# Patient Record
Sex: MALE | Race: WHITE | Employment: FULL TIME | URBAN - METROPOLITAN AREA
[De-identification: names, ages, dates, MRNs, and addresses within clinical notes are randomized per-mention and may not be internally consistent; named-entity substitution may affect disease eponyms.]

---

## 2017-01-08 DIAGNOSIS — F90.2 ATTENTION DEFICIT HYPERACTIVITY DISORDER (ADHD), COMBINED TYPE: Primary | ICD-10-CM

## 2017-01-09 RX ORDER — DEXTROAMPHETAMINE SACCHARATE, AMPHETAMINE ASPARTATE, DEXTROAMPHETAMINE SULFATE AND AMPHETAMINE SULFATE 5; 5; 5; 5 MG/1; MG/1; MG/1; MG/1
TABLET ORAL
Qty: 60 TABLET | Refills: 0 | Status: SHIPPED | OUTPATIENT
Start: 2017-01-09 | End: 2017-02-22

## 2017-01-09 RX ORDER — DEXTROAMPHETAMINE SACCHARATE, AMPHETAMINE ASPARTATE, DEXTROAMPHETAMINE SULFATE AND AMPHETAMINE SULFATE 5; 5; 5; 5 MG/1; MG/1; MG/1; MG/1
TABLET ORAL
Qty: 60 TABLET | Refills: 0 | Status: SHIPPED | OUTPATIENT
Start: 2017-01-09 | End: 2017-01-09

## 2017-02-22 DIAGNOSIS — F90.2 ATTENTION DEFICIT HYPERACTIVITY DISORDER (ADHD), COMBINED TYPE: ICD-10-CM

## 2017-02-23 RX ORDER — DEXTROAMPHETAMINE SACCHARATE, AMPHETAMINE ASPARTATE, DEXTROAMPHETAMINE SULFATE AND AMPHETAMINE SULFATE 5; 5; 5; 5 MG/1; MG/1; MG/1; MG/1
TABLET ORAL
Qty: 60 TABLET | Refills: 0 | Status: SHIPPED | OUTPATIENT
Start: 2017-02-23 | End: 2017-03-21

## 2017-03-21 DIAGNOSIS — F90.2 ATTENTION DEFICIT HYPERACTIVITY DISORDER (ADHD), COMBINED TYPE: ICD-10-CM

## 2017-03-22 RX ORDER — DEXTROAMPHETAMINE SACCHARATE, AMPHETAMINE ASPARTATE, DEXTROAMPHETAMINE SULFATE AND AMPHETAMINE SULFATE 5; 5; 5; 5 MG/1; MG/1; MG/1; MG/1
TABLET ORAL
Qty: 60 TABLET | Refills: 0 | Status: SHIPPED | OUTPATIENT
Start: 2017-03-22 | End: 2017-04-18

## 2017-04-18 DIAGNOSIS — F90.2 ATTENTION DEFICIT HYPERACTIVITY DISORDER (ADHD), COMBINED TYPE: ICD-10-CM

## 2017-04-19 RX ORDER — DEXTROAMPHETAMINE SACCHARATE, AMPHETAMINE ASPARTATE, DEXTROAMPHETAMINE SULFATE AND AMPHETAMINE SULFATE 5; 5; 5; 5 MG/1; MG/1; MG/1; MG/1
TABLET ORAL
Qty: 60 TABLET | Refills: 0 | Status: SHIPPED | OUTPATIENT
Start: 2017-04-19 | End: 2017-05-15

## 2017-05-15 DIAGNOSIS — F90.2 ATTENTION DEFICIT HYPERACTIVITY DISORDER (ADHD), COMBINED TYPE: ICD-10-CM

## 2017-05-15 RX ORDER — DEXTROAMPHETAMINE SACCHARATE, AMPHETAMINE ASPARTATE, DEXTROAMPHETAMINE SULFATE AND AMPHETAMINE SULFATE 5; 5; 5; 5 MG/1; MG/1; MG/1; MG/1
TABLET ORAL
Qty: 60 TABLET | Refills: 0 | Status: SHIPPED | OUTPATIENT
Start: 2017-05-15 | End: 2017-06-05

## 2017-06-05 DIAGNOSIS — F90.2 ATTENTION DEFICIT HYPERACTIVITY DISORDER (ADHD), COMBINED TYPE: ICD-10-CM

## 2017-06-06 RX ORDER — DEXTROAMPHETAMINE SACCHARATE, AMPHETAMINE ASPARTATE, DEXTROAMPHETAMINE SULFATE AND AMPHETAMINE SULFATE 5; 5; 5; 5 MG/1; MG/1; MG/1; MG/1
TABLET ORAL
Qty: 60 TABLET | Refills: 0 | Status: SHIPPED | OUTPATIENT
Start: 2017-06-06 | End: 2017-09-15 | Stop reason: DRUGHIGH

## 2017-06-16 DIAGNOSIS — F41.1 GENERALIZED ANXIETY DISORDER: Primary | ICD-10-CM

## 2017-06-16 LAB
ALBUMIN SERPL-MCNC: 3.8 G/DL (ref 3.4–5)
ALP SERPL-CCNC: 77 U/L (ref 65–260)
ALT SERPL W P-5'-P-CCNC: 42 U/L (ref 0–50)
ANION GAP SERPL CALCULATED.3IONS-SCNC: 5 MMOL/L (ref 3–14)
AST SERPL W P-5'-P-CCNC: 26 U/L (ref 0–35)
BASOPHILS # BLD AUTO: 0 10E9/L (ref 0–0.2)
BASOPHILS NFR BLD AUTO: 0.3 %
BILIRUB DIRECT SERPL-MCNC: <0.1 MG/DL (ref 0–0.2)
BILIRUB SERPL-MCNC: 0.3 MG/DL (ref 0.2–1.3)
BUN SERPL-MCNC: 13 MG/DL (ref 7–30)
CALCIUM SERPL-MCNC: 8.7 MG/DL (ref 8.5–10.1)
CHLORIDE SERPL-SCNC: 102 MMOL/L (ref 98–110)
CO2 SERPL-SCNC: 32 MMOL/L (ref 20–32)
CREAT SERPL-MCNC: 0.9 MG/DL (ref 0.5–1)
DIFFERENTIAL METHOD BLD: NORMAL
EOSINOPHIL # BLD AUTO: 0.5 10E9/L (ref 0–0.7)
EOSINOPHIL NFR BLD AUTO: 6.4 %
ERYTHROCYTE [DISTWIDTH] IN BLOOD BY AUTOMATED COUNT: 13.6 % (ref 10–15)
GFR SERPL CREATININE-BSD FRML MDRD: ABNORMAL ML/MIN/1.7M2
GLUCOSE SERPL-MCNC: 102 MG/DL (ref 70–99)
HCT VFR BLD AUTO: 45.9 % (ref 40–53)
HGB BLD-MCNC: 14.5 G/DL (ref 13.3–17.7)
LYMPHOCYTES # BLD AUTO: 1.9 10E9/L (ref 0.8–5.3)
LYMPHOCYTES NFR BLD AUTO: 26.6 %
MCH RBC QN AUTO: 26.8 PG (ref 26.5–33)
MCHC RBC AUTO-ENTMCNC: 31.6 G/DL (ref 31.5–36.5)
MCV RBC AUTO: 85 FL (ref 78–100)
MONOCYTES # BLD AUTO: 0.8 10E9/L (ref 0–1.3)
MONOCYTES NFR BLD AUTO: 11.3 %
NEUTROPHILS # BLD AUTO: 3.9 10E9/L (ref 1.6–8.3)
NEUTROPHILS NFR BLD AUTO: 55.4 %
PLATELET # BLD AUTO: 238 10E9/L (ref 150–450)
POTASSIUM SERPL-SCNC: 3.9 MMOL/L (ref 3.4–5.3)
PROT SERPL-MCNC: 7.1 G/DL (ref 6.8–8.8)
RBC # BLD AUTO: 5.42 10E12/L (ref 4.4–5.9)
SODIUM SERPL-SCNC: 139 MMOL/L (ref 133–144)
T4 FREE SERPL-MCNC: 0.85 NG/DL (ref 0.76–1.46)
TSH SERPL DL<=0.05 MIU/L-ACNC: 1.53 MU/L (ref 0.4–4)
WBC # BLD AUTO: 7 10E9/L (ref 4–11)

## 2017-06-16 PROCEDURE — 84439 ASSAY OF FREE THYROXINE: CPT | Performed by: PSYCHIATRY & NEUROLOGY

## 2017-06-16 PROCEDURE — 80050 GENERAL HEALTH PANEL: CPT | Performed by: PSYCHIATRY & NEUROLOGY

## 2017-06-16 PROCEDURE — 82248 BILIRUBIN DIRECT: CPT | Performed by: PSYCHIATRY & NEUROLOGY

## 2017-06-16 PROCEDURE — 80306 DRUG TEST PRSMV INSTRMNT: CPT | Performed by: PSYCHIATRY & NEUROLOGY

## 2017-06-16 PROCEDURE — 36415 COLL VENOUS BLD VENIPUNCTURE: CPT | Performed by: PSYCHIATRY & NEUROLOGY

## 2017-06-19 LAB
AMPHETAMINES UR QL: NOT DETECTED NG/ML
BARBITURATES UR QL SCN: NOT DETECTED NG/ML
BENZODIAZ UR QL SCN: NOT DETECTED NG/ML
BUPRENORPHINE UR QL: NOT DETECTED NG/ML
CANNABINOIDS UR QL: NOT DETECTED NG/ML
COCAINE UR QL SCN: NOT DETECTED NG/ML
D-METHAMPHET UR QL: NOT DETECTED NG/ML
METHADONE UR QL SCN: NOT DETECTED NG/ML
OPIATES UR QL SCN: NOT DETECTED NG/ML
OXYCODONE UR QL SCN: NOT DETECTED NG/ML
PCP UR QL SCN: NOT DETECTED NG/ML
PROPOXYPH UR QL: NOT DETECTED NG/ML
TRICYCLICS UR QL SCN: NOT DETECTED NG/ML

## 2017-09-15 ENCOUNTER — OFFICE VISIT (OUTPATIENT)
Dept: FAMILY MEDICINE | Facility: CLINIC | Age: 22
End: 2017-09-15
Payer: COMMERCIAL

## 2017-09-15 VITALS
WEIGHT: 220 LBS | SYSTOLIC BLOOD PRESSURE: 116 MMHG | HEIGHT: 72 IN | TEMPERATURE: 97.9 F | DIASTOLIC BLOOD PRESSURE: 59 MMHG | HEART RATE: 63 BPM | BODY MASS INDEX: 29.8 KG/M2

## 2017-09-15 DIAGNOSIS — N45.1 EPIDIDYMITIS: Primary | ICD-10-CM

## 2017-09-15 DIAGNOSIS — F90.2 ATTENTION DEFICIT HYPERACTIVITY DISORDER (ADHD), COMBINED TYPE: ICD-10-CM

## 2017-09-15 PROCEDURE — 99214 OFFICE O/P EST MOD 30 MIN: CPT | Performed by: FAMILY MEDICINE

## 2017-09-15 RX ORDER — ESCITALOPRAM OXALATE 20 MG/1
20 TABLET ORAL DAILY
COMMUNITY
Start: 2017-09-15 | End: 2018-05-22

## 2017-09-15 RX ORDER — DEXTROAMPHETAMINE SACCHARATE, AMPHETAMINE ASPARTATE, DEXTROAMPHETAMINE SULFATE AND AMPHETAMINE SULFATE 5; 5; 5; 5 MG/1; MG/1; MG/1; MG/1
TABLET ORAL
COMMUNITY
Start: 2017-09-15 | End: 2018-05-22

## 2017-09-15 RX ORDER — CIPROFLOXACIN 500 MG/1
500 TABLET, FILM COATED ORAL 2 TIMES DAILY
Qty: 20 TABLET | Refills: 0 | Status: SHIPPED | OUTPATIENT
Start: 2017-09-15 | End: 2018-05-22

## 2017-09-15 RX ORDER — DEXTROAMPHETAMINE SACCHARATE, AMPHETAMINE ASPARTATE, DEXTROAMPHETAMINE SULFATE AND AMPHETAMINE SULFATE 2.5; 2.5; 2.5; 2.5 MG/1; MG/1; MG/1; MG/1
10 TABLET ORAL 2 TIMES DAILY
Refills: 0 | COMMUNITY
Start: 2017-09-15 | End: 2017-09-15 | Stop reason: DRUGHIGH

## 2017-09-15 NOTE — PROGRESS NOTES
"  SUBJECTIVE:   Daniel Thomas is a 21 year old male who presents to clinic today for the following health issues:    He has a history of frequent pimples on the skin of the scrotum. He will try chlorhexidine on the scrotum with every shower.      He had a testicular pain several years ago and was treated with antibiotics and it resolved.    His pain is generally about the left testicle and is aggravated with certain weight lifting movements. There is no tenderness to palpation  Total face to face time: 25 minutes.  Time spent counseling on 15 minutes as outline above.        TESTICULAR PAIN      Duration: Past few days.    Description (location/character/radiation): Left testicular pain.    Intensity:  moderate    Accompanying signs and symptoms: States he has some bumps in the genital area that can be irritated against his underwear.  No fever or chills.    History (similar episodes/previous evaluation): Previous history of pain like this before.    Precipitating or alleviating factors: Exercise-doing squats.  Can have symptoms at times with sitting.    Therapies tried and outcome: None             Problem list and histories reviewed & adjusted, as indicated.  Additional history: as documented        Reviewed and updated as needed this visit by clinical staff     Reviewed and updated as needed this visit by Provider      Vitals: /59  Pulse 63  Temp 97.9  F (36.6  C) (Tympanic)  Ht 5' 11.75\" (1.822 m)  Wt 220 lb (99.8 kg)  BMI 30.05 kg/m2  BMI= Body mass index is 30.05 kg/(m^2).    Medical, surgical, family, social histories, allergies and meds reviewed and updated.    ROS:  General: No change in weight, sleep or appetite.  Normal energy.  No fever or chills  : Left testicle pain.    Exam:  GENERAL APPEARANCE ADULT: Alert, no acute distress   (male): penis, scrotum, testes normal, no hernias    ASSESSMENT:  (N45.1) Epididymitis  (primary encounter diagnosis)  Comment:   Plan: ciprofloxacin " (CIPRO) 500 MG tablet            (F90.2) Attention deficit hyperactivity disorder (ADHD), combined type  Comment:   Plan: amphetamine-dextroamphetamine (ADDERALL) 20 MG         per tablet              PLAN:  Orders Placed This Encounter     DISCONTD: amphetamine-dextroamphetamine (ADDERALL) 10 MG per tablet     escitalopram (LEXAPRO) 20 MG tablet     amphetamine-dextroamphetamine (ADDERALL) 20 MG per tablet     ciprofloxacin (CIPRO) 500 MG tablet   Recheck in clinic as needed.      There are no Patient Instructions on file for this visit.      Sanford Greenwood

## 2017-09-15 NOTE — MR AVS SNAPSHOT
"              After Visit Summary   9/15/2017    Daniel Thomas    MRN: [id number]           Patient Information     Date Of Birth          1995        Visit Information        Provider Department      9/15/2017 10:40 AM Sanford Greenwood MD [facility]        Today's Diagnoses     Epididymitis    -  1    Attention deficit hyperactivity disorder (ADHD), combined type           Follow-ups after your visit        Who to contact     If you have questions or need follow up information about today's clinic visit or your schedule please contact [facility] directly at [phone].  Normal or non-critical lab and imaging results will be communicated to you by [employer], letter or phone within 4 business days after the clinic has received the results. If you do not hear from us within 7 days, please contact the clinic through [employer] or phone. If you have a critical or abnormal lab result, we will notify you by phone as soon as possible.  Submit refill requests through [employer] or call your pharmacy and they will forward the refill request to us. Please allow 3 business days for your refill to be completed.          Additional Information About Your Visit        MyChart Information     [employer] lets you send messages to your doctor, view your test results, renew your prescriptions, schedule appointments and more. To sign up, go to [email or web address] . Click on \"Log in\" on the left side of the screen, which will take you to the Welcome page. Then click on \"Sign up Now\" on the right side of the page.     You will be asked to enter the access code listed below, as well as some personal information. Please follow the directions to create your username and password.     Your access code is: [email or web address]  Expires: 2017  6:56 PM     Your access code will  in 90 days. If you need help or a new code, please call your [facility] or [phone].        Care " "EveryWhere ID     This is your Care EveryWhere ID. This could be used by other organizations to access your [place] medical records  [id number]        Your Vitals Were     Pulse Temperature Height BMI (Body Mass Index)          63 97.9  F (36.6  C) (Tympanic) 5' 11.75\" (1.822 m) 30.05 kg/m2         Blood Pressure from Last 3 Encounters:   09/15/17 116/59   12/13/16 112/73   07/16/16 126/64    Weight from Last 3 Encounters:   09/15/17 220 lb (99.8 kg) (97 %)*   12/13/16 183 lb 10.3 oz (83.3 kg) (85 %)*   03/16/16 168 lb 6.9 oz (76.4 kg) (75 %)*     * Growth percentiles are based on [facility] 2-20 Years data.              Today, you had the following     No orders found for display         Today's Medication Changes          These changes are accurate as of: 9/15/17  6:56 PM.  If you have any questions, ask your nurse or doctor.               Start taking these medicines.        Dose/Directions    ciprofloxacin 500 MG tablet   Commonly known as:  CIPRO   Used for:  Epididymitis   Started by:  Sanford Greenwood MD        Dose:  500 mg   Take 1 tablet (500 mg) by mouth 2 times daily   Quantity:  20 tablet   Refills:  0         These medicines have changed or have updated prescriptions.        Dose/Directions    ADDERALL 20 MG per tablet   This may have changed:  Another medication with the same name was removed. Continue taking this medication, and follow the directions you see here.   Used for:  Attention deficit hyperactivity disorder (ADHD), combined type   Generic drug:  amphetamine-dextroamphetamine   Changed by:  Sanford Greenwood MD        Take 1 BID   Refills:  0            Where to get your medicines      These medications were sent to [id number] - [place] [address] [address], [facility] [address]     Phone:  [phone]     ciprofloxacin 500 MG tablet                Primary Care Provider Office Phone #    [facility] [phone] "       No address on file        Equal Access to Services     [facility] JUANA : Hadii aad dwaine chris Molina, waemelida luqjuan francisco, qaybta kaaljosue juanrameshkristen, waxhome rupa moralesswathipaul tineo. So [facility] [phone].    ATENCIÓN: Si habla español, tiene a smith disposición servicios gratuitos de asistencia lingüística. Llame al [phone].    We comply with applicable federal civil rights laws and [place] laws. We do not discriminate on the basis of race, color, national origin, age, disability sex, sexual orientation or gender identity.            Thank you!     Thank you for choosing [facility] [place]note 2]  for your care. Our goal is always to provide you with excellent care. Hearing back from our patients is one way we can continue to improve our services. Please take a few minutes to complete the written survey that you may receive in the mail after your visit with us. Thank you!             Your Updated Medication List - Protect others around you: Learn how to safely use, store and throw away your medicines at www.disposemymeds.org.          This list is accurate as of: 9/15/17  6:56 PM.  Always use your most recent med list.                   Brand Name Dispense Instructions for use Diagnosis    ADDERALL 20 MG per tablet   Generic drug:  amphetamine-dextroamphetamine      Take 1 BID    Attention deficit hyperactivity disorder (ADHD), combined type       ciprofloxacin 500 MG tablet    CIPRO    20 tablet    Take 1 tablet (500 mg) by mouth 2 times daily    Epididymitis       escitalopram 20 MG tablet    LEXAPRO     Take 1 tablet (20 mg) by mouth daily

## 2017-09-15 NOTE — NURSING NOTE
"Chief Complaint   Patient presents with     Testicular/scrotal Pain     Left side for the past few days with other symptoms.       Initial /59  Pulse 63  Temp 97.9  F (36.6  C) (Tympanic)  Ht 5' 11.75\" (1.822 m)  Wt 220 lb (99.8 kg)  BMI 30.05 kg/m2 Estimated body mass index is 30.05 kg/(m^2) as calculated from the following:    Height as of this encounter: 5' 11.75\" (1.822 m).    Weight as of this encounter: 220 lb (99.8 kg).  Medication Reconciliation: complete  "

## 2017-10-05 ENCOUNTER — TELEPHONE (OUTPATIENT)
Dept: PEDIATRICS | Facility: CLINIC | Age: 22
End: 2017-10-05

## 2017-12-15 ENCOUNTER — OFFICE VISIT (OUTPATIENT)
Dept: FAMILY MEDICINE | Facility: CLINIC | Age: 22
End: 2017-12-15
Payer: COMMERCIAL

## 2017-12-15 VITALS
WEIGHT: 217 LBS | SYSTOLIC BLOOD PRESSURE: 128 MMHG | HEART RATE: 75 BPM | BODY MASS INDEX: 29.64 KG/M2 | DIASTOLIC BLOOD PRESSURE: 65 MMHG | TEMPERATURE: 96.7 F

## 2017-12-15 DIAGNOSIS — F90.2 ATTENTION DEFICIT HYPERACTIVITY DISORDER (ADHD), COMBINED TYPE: ICD-10-CM

## 2017-12-15 DIAGNOSIS — F41.1 GENERALIZED ANXIETY DISORDER: ICD-10-CM

## 2017-12-15 DIAGNOSIS — F33.1 MODERATE EPISODE OF RECURRENT MAJOR DEPRESSIVE DISORDER (H): ICD-10-CM

## 2017-12-15 DIAGNOSIS — F98.8 ATTENTION DEFICIT DISORDER (ADD) WITHOUT HYPERACTIVITY: Primary | ICD-10-CM

## 2017-12-15 PROCEDURE — 99214 OFFICE O/P EST MOD 30 MIN: CPT | Performed by: FAMILY MEDICINE

## 2017-12-15 RX ORDER — DEXTROAMPHETAMINE SACCHARATE, AMPHETAMINE ASPARTATE, DEXTROAMPHETAMINE SULFATE AND AMPHETAMINE SULFATE 5; 5; 5; 5 MG/1; MG/1; MG/1; MG/1
20 TABLET ORAL DAILY
Qty: 30 TABLET | Refills: 0 | Status: SHIPPED | OUTPATIENT
Start: 2018-01-02 | End: 2017-12-15

## 2017-12-15 RX ORDER — DEXTROAMPHETAMINE SACCHARATE, AMPHETAMINE ASPARTATE, DEXTROAMPHETAMINE SULFATE AND AMPHETAMINE SULFATE 5; 5; 5; 5 MG/1; MG/1; MG/1; MG/1
20 TABLET ORAL DAILY
Qty: 30 TABLET | Refills: 0 | Status: SHIPPED | OUTPATIENT
Start: 2018-03-02 | End: 2018-05-22

## 2017-12-15 RX ORDER — DEXTROAMPHETAMINE SACCHARATE, AMPHETAMINE ASPARTATE, DEXTROAMPHETAMINE SULFATE AND AMPHETAMINE SULFATE 5; 5; 5; 5 MG/1; MG/1; MG/1; MG/1
20 TABLET ORAL DAILY
Qty: 30 TABLET | Refills: 0 | Status: SHIPPED | OUTPATIENT
Start: 2018-02-02 | End: 2017-12-15

## 2017-12-15 RX ORDER — DULOXETIN HYDROCHLORIDE 30 MG/1
30 CAPSULE, DELAYED RELEASE ORAL DAILY
Qty: 30 CAPSULE | Refills: 11 | Status: SHIPPED | OUTPATIENT
Start: 2017-12-15 | End: 2018-05-22

## 2017-12-15 ASSESSMENT — ANXIETY QUESTIONNAIRES
2. NOT BEING ABLE TO STOP OR CONTROL WORRYING: NEARLY EVERY DAY
GAD7 TOTAL SCORE: 16
1. FEELING NERVOUS, ANXIOUS, OR ON EDGE: MORE THAN HALF THE DAYS
IF YOU CHECKED OFF ANY PROBLEMS ON THIS QUESTIONNAIRE, HOW DIFFICULT HAVE THESE PROBLEMS MADE IT FOR YOU TO DO YOUR WORK, TAKE CARE OF THINGS AT HOME, OR GET ALONG WITH OTHER PEOPLE: VERY DIFFICULT
5. BEING SO RESTLESS THAT IT IS HARD TO SIT STILL: NEARLY EVERY DAY
3. WORRYING TOO MUCH ABOUT DIFFERENT THINGS: SEVERAL DAYS
6. BECOMING EASILY ANNOYED OR IRRITABLE: NEARLY EVERY DAY
7. FEELING AFRAID AS IF SOMETHING AWFUL MIGHT HAPPEN: MORE THAN HALF THE DAYS

## 2017-12-15 ASSESSMENT — PAIN SCALES - GENERAL: PAINLEVEL: NO PAIN (0)

## 2017-12-15 ASSESSMENT — PATIENT HEALTH QUESTIONNAIRE - PHQ9: 5. POOR APPETITE OR OVEREATING: MORE THAN HALF THE DAYS

## 2017-12-15 NOTE — MR AVS SNAPSHOT
"              After Visit Summary   12/15/2017    Daniel Thomas    MRN: [id number]           Patient Information     Date Of Birth          1995        Visit Information        Provider Department      12/15/2017 10:00 AM Sanford Greenwood MD [facility]        Today's Diagnoses     Attention deficit disorder (ADD) without hyperactivity    -  1    Generalized anxiety disorder        Moderate episode of recurrent major depressive disorder (H)          Care Instructions    To taper Wellbutrin take 1 pill every other day for 1 week then 1 pill every 3rd day for 1 week then stop          Follow-ups after your visit        Who to contact     If you have questions or need follow up information about today's clinic visit or your schedule please contact [facility] directly at [phone].  Normal or non-critical lab and imaging results will be communicated to you by MyChart, letter or phone within 4 business days after the clinic has received the results. If you do not hear from us within 7 days, please contact the clinic through MyChart or phone. If you have a critical or abnormal lab result, we will notify you by phone as soon as possible.  Submit refill requests through [employer] or call your pharmacy and they will forward the refill request to us. Please allow 3 business days for your refill to be completed.          Additional Information About Your Visit        MyChart Information     [employer] lets you send messages to your doctor, view your test results, renew your prescriptions, schedule appointments and more. To sign up, go to [email or web address] . Click on \"Log in\" on the left side of the screen, which will take you to the Welcome page. Then click on \"Sign up Now\" on the right side of the page.     You will be asked to enter the access code listed below, as well as some personal information. Please follow the directions to create your username and " password.     Your access code is: [email or web address]  Expires: 3/15/2018 11:02 AM     Your access code will  in 90 days. If you need help or a new code, please call your [facility] or [phone].        Care EveryWhere ID     This is your Care EveryWhere ID. This could be used by other organizations to access your [place] medical records  [id number]        Your Vitals Were     Pulse Temperature BMI (Body Mass Index)             75 96.7  F (35.9  C) (Tympanic) 29.64 kg/m2          Blood Pressure from Last 3 Encounters:   12/15/17 128/65   09/15/17 116/59   16 112/73    Weight from Last 3 Encounters:   12/15/17 217 lb (98.4 kg)   09/15/17 220 lb (99.8 kg) (97 %)*   16 183 lb 10.3 oz (83.3 kg) (85 %)*     * Growth percentiles are based on [facility] 2-20 Years data.              Today, you had the following     No orders found for display         Today's Medication Changes          These changes are accurate as of: 12/15/17 11:02 AM.  If you have any questions, ask your nurse or doctor.               Start taking these medicines.        Dose/Directions    DULoxetine 30 MG EC capsule   Commonly known as:  CYMBALTA   Used for:  Generalized anxiety disorder, Moderate episode of recurrent major depressive disorder (H)   Started by:  Sanford Greenwood MD        Dose:  30 mg   Take 1 capsule (30 mg) by mouth daily   Quantity:  30 capsule   Refills:  11         These medicines have changed or have updated prescriptions.        Dose/Directions    * ADDERALL 20 MG per tablet   This may have changed:  Another medication with the same name was added. Make sure you understand how and when to take each.   Used for:  Attention deficit hyperactivity disorder (ADHD), combined type   Generic drug:  amphetamine-dextroamphetamine   Changed by:  Sanford Greenwood MD        Take 1 BID   Refills:  0       * amphetamine-dextroamphetamine 20 MG per tablet   Commonly known as:  ADDERALL   This may have changed:  You  were already taking a medication with the same name, and this prescription was added. Make sure you understand how and when to take each.   Used for:  Attention deficit disorder (ADD) without hyperactivity   Changed by:  Sanford Greenwood MD        Dose:  20 mg   Start taking on:  3/2/2018   Take 1 tablet (20 mg) by mouth daily   Quantity:  30 tablet   Refills:  0       * Notice:  This list has 2 medication(s) that are the same as other medications prescribed for you. Read the directions carefully, and ask your doctor or other care provider to review them with you.         Where to get your medicines      These medications were sent to [id number] - [place], [place] - [address] [facility]  [address] [facility] [address], [facility] [address]     Phone:  [phone]     DULoxetine 30 MG EC capsule         Some of these will need a paper prescription and others can be bought over the counter.  Ask your nurse if you have questions.     Bring a paper prescription for each of these medications     amphetamine-dextroamphetamine 20 MG per tablet                Primary Care Provider Office Phone # Fax #    Sanford Greenwood [phone] [address] [facility] [address]        Equal Access to Services     [facility]: Hadii aad dwaine hadasho Sogillali, waaxda luqadaha, qaybta kaalmada adeegyada, bud goode hayjose ba . So [facility] [phone].    ATENCIÓN: Si habla español, tiene a smith disposición servicios gratuitos de asistencia lingüística. Llame al [phone].    We comply with applicable federal civil rights laws and [place] laws. We do not discriminate on the basis of race, color, national origin, age, disability, sex, sexual orientation, or gender identity.            Thank you!     Thank you for choosing [facility]  for your care. Our goal is always to provide you with excellent care. Hearing back from our patients is one way we  can continue to improve our services. Please take a few minutes to complete the written survey that you may receive in the mail after your visit with us. Thank you!             Your Updated Medication List - Protect others around you: Learn how to safely use, store and throw away your medicines at www.disposemymeds.org.          This list is accurate as of: 12/15/17 11:02 AM.  Always use your most recent med list.                   Brand Name Dispense Instructions for use Diagnosis    * ADDERALL 20 MG per tablet   Generic drug:  amphetamine-dextroamphetamine      Take 1 BID    Attention deficit hyperactivity disorder (ADHD), combined type       * amphetamine-dextroamphetamine 20 MG per tablet   Start taking on:  3/2/2018    ADDERALL    30 tablet    Take 1 tablet (20 mg) by mouth daily    Attention deficit disorder (ADD) without hyperactivity       ciprofloxacin 500 MG tablet    CIPRO    20 tablet    Take 1 tablet (500 mg) by mouth 2 times daily    Epididymitis       DULoxetine 30 MG EC capsule    CYMBALTA    30 capsule    Take 1 capsule (30 mg) by mouth daily    Generalized anxiety disorder, Moderate episode of recurrent major depressive disorder (H)       escitalopram 20 MG tablet    LEXAPRO     Take 1 tablet (20 mg) by mouth daily        * Notice:  This list has 2 medication(s) that are the same as other medications prescribed for you. Read the directions carefully, and ask your doctor or other care provider to review them with you.

## 2017-12-15 NOTE — PATIENT INSTRUCTIONS
To taper Wellbutrin take 1 pill every other day for 1 week then 1 pill every 3rd day for 1 week then stop

## 2017-12-15 NOTE — PROGRESS NOTES
SUBJECTIVE:   Daniel Thomas is a 22 year old male who presents to clinic today for the following health issues:  He is here with his mother today.  The past he has been seen by , [facility] pediatric behavioral specialist.  He was started on Ritalin and he had been tried on a number of different antidepressants to help with his severe depression and anxiety.  Now he is seeing 1 of the psychiatrists at [facility].  He had been on Zoloft, Lexapro in the past and most recently he has been on Wellbutrin for the past 2 months which is not helping his anxiety or depression.  They are having trouble with all of the cost of seeing a psychiatrist with frequent visits and yearly testing.  I will switch him to Cymbalta today.  I encouraged him to continue follow-up with a psychiatrist until they found something that worked and then I could take over prescribing.    He takes Adderall immediate release 20 mg when he wakes up in the early afternoon.  I gave him 3 months with the prescriptions and he will call in 3 months for refills.  He must be seen at least every 6 months.      Medication Followup of adderal. Bupropion    Taking Medication as prescribed: yes    Side Effects:  None    Medication Helping Symptoms:  NO-adderal is fine but the Bupropion is not working for the anxiety             Problem list and histories reviewed & adjusted, as indicated.  Additional history: as documented    Patient Active Problem List   Diagnosis     Behavior problems     ADHD (attention deficit hyperactivity disorder)     Tourette's disorder     Anxiety     OCD (obsessive compulsive disorder)     Past Surgical History:   Procedure Laterality Date     ADENOIDECTOMY  2001     PE TUBES         Social History   Substance Use Topics     Smoking status: Current Some Day Smoker     Types: Cigarettes     Smokeless tobacco: Never Used      Comment: Smokes once per week.     Alcohol use No     Family History    Problem Relation Age of Onset     Arthritis Father      Prostate Cancer Maternal Grandfather      Thyroid Disease Paternal Grandmother      Thyroid Disease Mother      Breast Cancer Maternal Grandmother      CANCER Paternal Grandfather      Bladder cancer             Reviewed and updated as needed this visit by clinical staffTobacco  Allergies  Med Hx  Surg Hx  Fam Hx  Soc Hx      Reviewed and updated as needed this visit by Provider         ROS:  CONSTITUTIONAL:NEGATIVE for fever, chills, change in weight  PSYCHIATRIC: concentration difficulty    OBJECTIVE:     /65 (BP Location: Right arm, Patient Position: Chair, Cuff Size: Adult Large)  Pulse 75  Temp 96.7  F (35.9  C) (Tympanic)  Wt 217 lb (98.4 kg)  BMI 29.64 kg/m2  Body mass index is 29.64 kg/(m^2).  GENERAL: healthy, alert and no distress  PSYCH: mentation appears normal, affect normal/bright        ASSESSMENT/PLAN:               ICD-10-CM    1. Attention deficit disorder (ADD) without hyperactivity F98.8 amphetamine-dextroamphetamine (ADDERALL) 20 MG per tablet     DISCONTINUED: amphetamine-dextroamphetamine (ADDERALL) 20 MG per tablet     DISCONTINUED: amphetamine-dextroamphetamine (ADDERALL) 20 MG per tablet   2. Generalized anxiety disorder F41.1 DULoxetine (CYMBALTA) 30 MG EC capsule   3. Moderate episode of recurrent major depressive disorder (H) F33.1 DULoxetine (CYMBALTA) 30 MG EC capsule   4. Attention deficit hyperactivity disorder (ADHD), combined type F90.2      He will call in 6 weeks to let us know how he is doing on the Cymbalta.  We will make a decision on increasing at that time.  He will call for refills of Adderall in 3 months and I will put 3 months worth of refills at the .  He must be seen every 6 months.  Total face to face time: 25 minutes.  Time spent counseling on 15 minutes as outline above.      Sanford Greenwood MD  [facility]

## 2017-12-15 NOTE — NURSING NOTE
"Chief Complaint   Patient presents with     Recheck Medication     pt needs dr priest to take over perscribing pt's medication pt states adderal is working but the anxiety med is not working, pt was being seen at [facility],       Initial /65 (BP Location: Right arm, Patient Position: Chair, Cuff Size: Adult Large)  Pulse 75  Temp 96.7  F (35.9  C) (Tympanic)  Wt 217 lb (98.4 kg)  BMI 29.64 kg/m2 Estimated body mass index is 29.64 kg/(m^2) as calculated from the following:    Height as of 9/15/17: 5' 11.75\" (1.822 m).    Weight as of this encounter: 217 lb (98.4 kg).  Medication Reconciliation: complete   Arlyn Harris CMA       "

## 2017-12-16 ASSESSMENT — ANXIETY QUESTIONNAIRES: GAD7 TOTAL SCORE: 16

## 2017-12-18 PROBLEM — F33.1 MODERATE EPISODE OF RECURRENT MAJOR DEPRESSIVE DISORDER (H): Status: ACTIVE | Noted: 2017-12-18

## 2018-02-22 ENCOUNTER — TELEPHONE (OUTPATIENT)
Dept: FAMILY MEDICINE | Facility: CLINIC | Age: 23
End: 2018-02-22

## 2018-02-22 NOTE — TELEPHONE ENCOUNTER
Reason for Call:  Form, our goal is to have forms completed with 72 hours, however, some forms may require a visit or additional information.    Type of letter, form or note:  medical    Who is the form from?: Application for Eligibility for [employer] Program (if other please explain)    Where did the form come from: Patient or family brought in       What clinic location was the form placed at?: [facility]    Where the form was placed: Given to MA/RN    What number is listed as a contact on the form?: [phone]       Additional comments: form placed in forms box green side.    Call taken on 2/22/2018 at 8:29 AM by Yadira Crockett

## 2018-04-27 ENCOUNTER — HOSPITAL ENCOUNTER (EMERGENCY)
Facility: CLINIC | Age: 23
Discharge: HOME | End: 2018-04-27
Attending: PHYSICIAN ASSISTANT | Admitting: PHYSICIAN ASSISTANT
Payer: OTHER MISCELLANEOUS

## 2018-04-27 VITALS
DIASTOLIC BLOOD PRESSURE: 81 MMHG | RESPIRATION RATE: 16 BRPM | SYSTOLIC BLOOD PRESSURE: 136 MMHG | OXYGEN SATURATION: 98 % | TEMPERATURE: 97.3 F

## 2018-04-27 DIAGNOSIS — S61.011A LACERATION OF RIGHT THUMB WITHOUT DAMAGE TO NAIL, FOREIGN BODY PRESENCE UNSPECIFIED, INITIAL ENCOUNTER: ICD-10-CM

## 2018-04-27 PROCEDURE — 99213 OFFICE O/P EST LOW 20 MIN: CPT | Performed by: PHYSICIAN ASSISTANT

## 2018-04-27 PROCEDURE — G0463 HOSPITAL OUTPT CLINIC VISIT: HCPCS

## 2018-04-27 PROCEDURE — 90715 TDAP VACCINE 7 YRS/> IM: CPT | Performed by: PHYSICIAN ASSISTANT

## 2018-04-27 PROCEDURE — 90471 IMMUNIZATION ADMIN: CPT

## 2018-04-27 PROCEDURE — 25000128 H RX IP 250 OP 636: Performed by: PHYSICIAN ASSISTANT

## 2018-04-27 RX ADMIN — CLOSTRIDIUM TETANI TOXOID ANTIGEN (FORMALDEHYDE INACTIVATED), CORYNEBACTERIUM DIPHTHERIAE TOXOID ANTIGEN (FORMALDEHYDE INACTIVATED), BORDETELLA PERTUSSIS TOXOID ANTIGEN (GLUTARALDEHYDE INACTIVATED), BORDETELLA PERTUSSIS FILAMENTOUS HEMAGGLUTININ ANTIGEN (FORMALDEHYDE INACTIVATED), BORDETELLA PERTUSSIS PERTACTIN ANTIGEN, AND BORDETELLA PERTUSSIS FIMBRIAE 2/3 ANTIGEN 0.5 ML: 5; 2; 2.5; 5; 3; 5 INJECTION, SUSPENSION INTRAMUSCULAR at 14:27

## 2018-04-27 NOTE — ED AVS SNAPSHOT
[facility] Emergency Department    [address] [address]    Phone:  [phone]    Fax:  [phone]                                       Daniel Thomas   MRN: [id number]    Department:  [facility] Emergency Department   Date of Visit:  4/27/2018           After Visit Summary Signature Page     I have received my discharge instructions, and my questions have been answered. I have discussed any challenges I see with this plan with the nurse or doctor.    ..........................................................................................................................................  Patient/Patient Representative Signature      ..........................................................................................................................................  Patient Representative Print Name and Relationship to Patient    ..................................................               ................................................  Date                                            Time    ..........................................................................................................................................  Reviewed by Signature/Title    ...................................................              ..............................................  Date                                                            Time

## 2018-04-27 NOTE — ED PROVIDER NOTES
History     Chief Complaint   Patient presents with     Wound Infection     Has a cut in R thumb, happened 2 days ago at work.  Pain and swelling in thumb.     Work Related Injury     HPI  Daniel Thomas is a 22 year old right hand dominant male who presents to the urgent care with concern over wound recheck.  Patient sustained a laceration at work to his right thumb.  He is unsure exactly how it happened but state he has to reach into a metal press and believes that he cut it on a metal edge.  He did have mild pain initially.  He states concern that he has had intermittent bleeding from the wound since then however no bleeding today.  He has not had any worsening of his pain.  No swelling.  No erythema, no discharge from the wound.  He is unsure if there is a foreign body embedded in it but states it looks like there could be something.  He is unsure the date of his last tetanus vaccine.      Problem List:    Patient Active Problem List    Diagnosis Date Noted     Moderate episode of recurrent major depressive disorder (H) 12/18/2017     Priority: Medium     Anxiety 08/24/2015     Priority: Medium     OCD (obsessive compulsive disorder) 08/24/2015     Priority: Medium     Tourette's disorder 11/16/2011     Priority: Medium     mild       Behavior problems 02/22/2011     Priority: Medium     ADHD (attention deficit hyperactivity disorder) 02/22/2011     Priority: Medium     He will call in 3 months for refills and I will put 3 months worth of refills at the .  He must be seen at least every 6 months.          Past Medical History:    No past medical history on file.    Past Surgical History:    Past Surgical History:   Procedure Laterality Date     ADENOIDECTOMY  2001     PE TUBES         Family History:    Family History   Problem Relation Age of Onset     Arthritis Father      Prostate Cancer Maternal Grandfather      Thyroid Disease Paternal Grandmother      Thyroid Disease Mother      Breast  Cancer Maternal Grandmother      CANCER Paternal Grandfather      Bladder cancer     Social History:  Marital Status:  Single [1]  Social History   Substance Use Topics     Smoking status: Current Some Day Smoker     Types: Cigarettes     Smokeless tobacco: Never Used      Comment: Smokes once per week.     Alcohol use No      Medications:      amphetamine-dextroamphetamine (ADDERALL) 20 MG per tablet   amphetamine-dextroamphetamine (ADDERALL) 20 MG per tablet   ciprofloxacin (CIPRO) 500 MG tablet   DULoxetine (CYMBALTA) 30 MG EC capsule   escitalopram (LEXAPRO) 20 MG tablet     Review of Systems  INTEGUMENTARY/SKIN: POSITIVE for laceration to the distal right thumb   MUSCULOSKELETAL: POSITIVE for resolved right thumb pain  and NEGATIVE for other arthralgias or myalgias   NEURO: NEGATIVE for numbness, paresthesias   Physical Exam   BP: 136/81  Heart Rate: 55  Temp: 97.3  F (36.3  C)  Resp: 16  SpO2: 98 %  Physical Exam   Constitutional: He is oriented to person, place, and time. He appears well-developed and well-nourished. No distress.   Cardiovascular:   Pulses:       Radial pulses are 2+ on the right side   Musculoskeletal:        Right wrist: Normal.        Right hand: He exhibits laceration. He exhibits normal range of motion, no tenderness, no bony tenderness, normal two-point discrimination, normal capillary refill, no deformity and no swelling. Normal sensation noted.   Neurological: He is alert and oriented to person, place, and time. No sensory deficit.   Skin: Skin is warm and dry. No rash noted. No erythema.   1.5 cm superficial laceration confined to the dermis     ED Course     ED Course     Procedures        Critical Care time:  none            No results found for this or any previous visit (from the past 24 hour(s)).    Medications   Tdap (tetanus-diphtheria-acell pertussis) (ADACEL) injection 0.5 mL (0.5 mLs Intramuscular Given 4/27/18 8309)       Assessments & Plan (with Medical Decision Making)      I have reviewed the nursing notes.    I have reviewed the findings, diagnosis, plan and need for follow up with the patient.       New Prescriptions    No medications on file     Final diagnoses:   Laceration of right thumb without damage to nail, foreign body presence unspecified, initial encounter     22-year-old male presents to urgent care with concern over laceration to his distal right thumb which occurred at work..  Physical exam findings as described above were significant for a 1.5 cm superficial laceration to the distal tip of the thumb,no evidence of recent bleeding.  There is no evidence of secondary cellulitis, abscess formation or other signs of infection.  Prior to discharge patient's tetanus vaccine was updated.  He was instructed he may return to full activities at work without restrictions.  Signs of infection, worrisome reasons to return to ER/UC discussed.  Follow upas needed.      Disclaimer: This note consists of symbols derived from keyboarding, dictation, and/or voice recognition software. As a result, there may be errors in the script that have gone undetected.  Please consider this when interpreting information found in the chart.      4/27/2018   [facility] EMERGENCY DEPARTMENT     Martha Goldstein PA-C  04/27/18 1432

## 2018-04-27 NOTE — ED AVS SNAPSHOT
[facility] Emergency Department    [address]    [place] [facility] [place] [address]    Phone:  [phone]    Fax:  [phone]                                       Daniel Thmoas   MRN: [id number]    Department:  [facility] Emergency Department   Date of Visit:  4/27/2018           Patient Information     Date Of Birth          1995        Your diagnoses for this visit were:     Laceration of right thumb without damage to nail, foreign body presence unspecified, initial encounter        You were seen by Martha Goldstein PA-C.      Follow-up Information     Follow up with Sanford Greenwood MD.    Specialties:  Family Practice, Occupational Medicine    Why:  If symptoms worsen, As needed    Contact information:    [address]  [facility] [address]  [phone]        Discharge References/Attachments     WOUND CHECK (NO INFECTION) (ENGLISH)      24 Hour Appointment Hotline       To make an appointment at any [facility], call [phone] ([phone]). If you don't have a family doctor or clinic, we will help you find one. [facility] clinics are conveniently located to serve the needs of you and your family.             Review of your medicines      Our records show that you are taking the medicines listed below. If these are incorrect, please call your family doctor or clinic.        Dose / Directions Last dose taken    * ADDERALL 20 MG per tablet   Generic drug:  amphetamine-dextroamphetamine        Take 1 BID   Refills:  0        * amphetamine-dextroamphetamine 20 MG per tablet   Commonly known as:  ADDERALL   Dose:  20 mg   Quantity:  30 tablet        Take 1 tablet (20 mg) by mouth daily   Refills:  0        ciprofloxacin 500 MG tablet   Commonly known as:  CIPRO   Dose:  500 mg   Quantity:  20 tablet        Take 1 tablet (500 mg) by mouth 2 times daily   Refills:  0        DULoxetine 30 MG EC capsule   Commonly known as:  CYMBALTA   Dose:  30 mg   Quantity:  30 capsule         Take 1 capsule (30 mg) by mouth daily   Refills:  11        escitalopram 20 MG tablet   Commonly known as:  LEXAPRO   Dose:  20 mg        Take 1 tablet (20 mg) by mouth daily   Refills:  0        * Notice:  This list has 2 medication(s) that are the same as other medications prescribed for you. Read the directions carefully, and ask your doctor or other care provider to review them with you.            Orders Needing Specimen Collection     None      Pending Results     No orders found from 4/25/2018 to 4/28/2018.            Pending Culture Results     No orders found from 4/25/2018 to 4/28/2018.            Pending Results Instructions     If you had any lab results that were not finalized at the time of your Discharge, you can call the ED Lab Result RN at [phone]. You will be contacted by this team for any positive Lab results or changes in treatment. The nurses are available 7 days a week from 10A to 6:30P.  You can leave a message 24 hours per day and they will return your call.        Test Results From Your Hospital Stay               Thank you for choosing [facility]       Thank you for choosing [facility] for your care. Our goal is always to provide you with excellent care. Hearing back from our patients is one way we can continue to improve our services. Please take a few minutes to complete the written survey that you may receive in the mail after you visit with us. Thank you!        [employer] Information     [employer] gives you secure access to your electronic health record. If you see a primary care provider, you can also send messages to your care team and make appointments. If you have questions, please call your primary care clinic.  If you do not have a primary care provider, please call [phone] and they will assist you.        Care EveryWhere ID     This is your Care EveryWhere ID. This could be used by other organizations to access your [facility] medical records  [id number]        Equal Access to  Services     [facility]: Rob Molina, waemelida luqadaha, qaybta kabud hale. So [facility] [phone].    ATENCIÓN: Si habla español, tiene a smith disposición servicios gratuitos de asistencia lingüística. Llame al [phone].    We comply with applicable federal civil rights laws and [place] laws. We do not discriminate on the basis of race, color, national origin, age, disability, sex, sexual orientation, or gender identity.            After Visit Summary       This is your record. Keep this with you and show to your community pharmacist(s) and doctor(s) at your next visit.

## 2018-05-22 ENCOUNTER — HOSPITAL ENCOUNTER (EMERGENCY)
Facility: CLINIC | Age: 23
Discharge: HOME | End: 2018-05-22
Attending: PHYSICIAN ASSISTANT | Admitting: PHYSICIAN ASSISTANT
Payer: COMMERCIAL

## 2018-05-22 ENCOUNTER — APPOINTMENT (OUTPATIENT)
Dept: GENERAL RADIOLOGY | Facility: CLINIC | Age: 23
End: 2018-05-22
Attending: PHYSICIAN ASSISTANT
Payer: COMMERCIAL

## 2018-05-22 VITALS
SYSTOLIC BLOOD PRESSURE: 117 MMHG | TEMPERATURE: 98.7 F | BODY MASS INDEX: 31.41 KG/M2 | WEIGHT: 230 LBS | RESPIRATION RATE: 18 BRPM | DIASTOLIC BLOOD PRESSURE: 73 MMHG | OXYGEN SATURATION: 96 % | HEART RATE: 80 BPM

## 2018-05-22 DIAGNOSIS — R06.02 SOB (SHORTNESS OF BREATH): ICD-10-CM

## 2018-05-22 DIAGNOSIS — J30.2 ACUTE SEASONAL ALLERGIC RHINITIS, UNSPECIFIED TRIGGER: ICD-10-CM

## 2018-05-22 LAB
INTERNAL QC OK POCT: YES
S PYO AG THROAT QL IA.RAPID: NEGATIVE

## 2018-05-22 PROCEDURE — G0463 HOSPITAL OUTPT CLINIC VISIT: HCPCS | Mod: 25 | Performed by: PHYSICIAN ASSISTANT

## 2018-05-22 PROCEDURE — 71046 X-RAY EXAM CHEST 2 VIEWS: CPT

## 2018-05-22 PROCEDURE — 25000125 ZZHC RX 250: Performed by: PHYSICIAN ASSISTANT

## 2018-05-22 PROCEDURE — 87880 STREP A ASSAY W/OPTIC: CPT | Performed by: PHYSICIAN ASSISTANT

## 2018-05-22 PROCEDURE — 99213 OFFICE O/P EST LOW 20 MIN: CPT | Mod: Z6 | Performed by: PHYSICIAN ASSISTANT

## 2018-05-22 PROCEDURE — 87081 CULTURE SCREEN ONLY: CPT | Performed by: PHYSICIAN ASSISTANT

## 2018-05-22 RX ORDER — ALBUTEROL SULFATE 90 UG/1
1-2 AEROSOL, METERED RESPIRATORY (INHALATION) EVERY 4 HOURS PRN
Qty: 1 INHALER | Refills: 0 | Status: SHIPPED | OUTPATIENT
Start: 2018-05-22 | End: 2020-01-28

## 2018-05-22 RX ORDER — ALBUTEROL SULFATE 0.83 MG/ML
2.5 SOLUTION RESPIRATORY (INHALATION) ONCE
Status: COMPLETED | OUTPATIENT
Start: 2018-05-22 | End: 2018-05-22

## 2018-05-22 RX ADMIN — ALBUTEROL SULFATE 2.5 MG: 2.5 SOLUTION RESPIRATORY (INHALATION) at 17:27

## 2018-05-22 ASSESSMENT — ENCOUNTER SYMPTOMS
TROUBLE SWALLOWING: 0
ABDOMINAL PAIN: 0
NAUSEA: 0
SINUS PAIN: 0
RHINORRHEA: 1
PALPITATIONS: 0
DIARRHEA: 0
FEVER: 0
SINUS PRESSURE: 0
HEADACHES: 0
SHORTNESS OF BREATH: 1
VOMITING: 0
WHEEZING: 0
FATIGUE: 0
COUGH: 1
SORE THROAT: 0

## 2018-05-22 NOTE — ED AVS SNAPSHOT
[facility] Emergency Department    [address] [address]    Phone:  [phone]    Fax:  [phone]                                       Daniel Thomas   MRN: [id number]    Department:  [facility] Emergency Department   Date of Visit:  5/22/2018           Patient Information     Date Of Birth          1995        Your diagnoses for this visit were:     Acute seasonal allergic rhinitis, unspecified trigger     SOB (shortness of breath) improved with albuterol neb treatment.       You were seen by Jalyn Pride PA-C.      Follow-up Information     Follow up with Sanford Greenwood MD In 1 week.    Specialties:  Family Practice, Occupational Medicine    Contact information:    [address] [facility] [id number] [phone]          Discharge Instructions       Use medication as directed; follow up with primary care provider for recheck in 1 week.     Continue over the counter antihistamine daily     Patient to return to Emergency Room if fevers, productive cough, shortness of breath, chest pain occur.     24 Hour Appointment Hotline       To make an appointment at any [facility] clinic, call [phone] ([phone]). If you don't have a family doctor or clinic, we will help you find one. [facility] clinics are conveniently located to serve the needs of you and your family.             Review of your medicines      START taking        Dose / Directions Last dose taken    albuterol 108 (90 Base) MCG/ACT Inhaler   Commonly known as:  PROAIR HFA/PROVENTIL HFA/VENTOLIN HFA   Dose:  1-2 puff   Quantity:  1 Inhaler        Inhale 1-2 puffs into the lungs every 4 hours as needed   Refills:  0          Our records show that you are taking the medicines listed below. If these are incorrect, please call your family doctor or clinic.        Dose / Directions Last dose taken    CYMBALTA PO        Refills:  0                Prescriptions were sent or printed at  these locations (1 Prescription)                   [id number] - [place], [place] - [address]   [address], [address], [facility] [address]    Telephone:  [phone]   Fax:  [phone]   Hours:                  E-Prescribed (1 of 1)         albuterol (PROAIR HFA/PROVENTIL HFA/VENTOLIN HFA) 108 (90 Base) MCG/ACT Inhaler                Procedures and tests performed during your visit     Beta strep group A culture    Rapid strep group A screen POCT    XR Chest 2 Views      Orders Needing Specimen Collection     None      Pending Results     Date and Time Order Name Status Description    5/22/2018 1732 XR Chest 2 Views Preliminary     5/22/2018 1731 Beta strep group A culture In process             Pending Culture Results     Date and Time Order Name Status Description    5/22/2018 1731 Beta strep group A culture In process             Pending Results Instructions     If you had any lab results that were not finalized at the time of your Discharge, you can call the ED Lab Result RN at [phone]. You will be contacted by this team for any positive Lab results or changes in treatment. The nurses are available 7 days a week from 10A to 6:30P.  You can leave a message 24 hours per day and they will return your call.        Test Results From Your Hospital Stay        5/22/2018  5:32 PM      Component Results     Component Value Ref Range & Units Status    Rapid Strep A Screen Negative neg Final    Internal QC OK Yes  Final         5/22/2018  5:42 PM         5/22/2018  5:46 PM      Narrative     CHEST TWO VIEW   5/22/2018 5:38 PM     HISTORY: Shortness of breath, cough.     COMPARISON: None.        Impression     IMPRESSION: No acute cardiopulmonary disease.                Thank you for choosing [facility]       Thank you for choosing [facility] for your care. Our goal is always to provide you with excellent care. Hearing back from our patients is one way we can continue to improve our  services. Please take a few minutes to complete the written survey that you may receive in the mail after you visit with us. Thank you!        [employer] Information     [employer] gives you secure access to your electronic health record. If you see a primary care provider, you can also send messages to your care team and make appointments. If you have questions, please call your primary care clinic.  If you do not have a primary care provider, please call [phone] and they will assist you.        Care EveryWhere ID     This is your Care EveryWhere ID. This could be used by other organizations to access your [facility] medical records  [id number]        Equal Access to Services     [facility] : Rob Molina, ronald graham, bud catalan . So [facility] [phone].    ATENCIÓN: Si habla español, tiene a smith disposición servicios gratuitos de asistencia lingüística. Llame al [phone].    We comply with applicable federal civil rights laws and [place] laws. We do not discriminate on the basis of race, color, national origin, age, disability, sex, sexual orientation, or gender identity.            After Visit Summary       This is your record. Keep this with you and show to your community pharmacist(s) and doctor(s) at your next visit.

## 2018-05-22 NOTE — ED TRIAGE NOTES
Patient here for chest congestion/asthma, symptoms started 3 days ago.  Patient presents ambulatory to the urgent care.

## 2018-05-22 NOTE — DISCHARGE INSTRUCTIONS
Use medication as directed; follow up with primary care provider for recheck in 1 week.     Continue over the counter antihistamine daily     Patient to return to Emergency Room if fevers, productive cough, shortness of breath, chest pain occur.

## 2018-05-22 NOTE — ED AVS SNAPSHOT
[facility] Emergency Department    [address] [address]    Phone:  [phone]    Fax:  [phone]                                       Daniel Thomas   MRN: [id number]    Department:  [facility] Emergency Department   Date of Visit:  5/22/2018           After Visit Summary Signature Page     I have received my discharge instructions, and my questions have been answered. I have discussed any challenges I see with this plan with the nurse or doctor.    ..........................................................................................................................................  Patient/Patient Representative Signature      ..........................................................................................................................................  Patient Representative Print Name and Relationship to Patient    ..................................................               ................................................  Date                                            Time    ..........................................................................................................................................  Reviewed by Signature/Title    ...................................................              ..............................................  Date                                                            Time

## 2018-05-22 NOTE — ED PROVIDER NOTES
History     Chief Complaint   Patient presents with     Asthma     HPI  Daniel Thomas is a 22 year old male who presents with     ENT Symptoms             Symptoms: cc Present Absent Comment   Fever/Chills   x    Fatigue   x    Muscle Aches   x    Eye Irritation   x    Sneezing   x    Nasal Gonzalez/Drg  x     Sinus Pressure/Pain   x    Loss of smell   x    Dental pain   x    Sore Throat   x    Swollen Glands  x     Ear Pain/Fullness   x    Cough  x     Wheeze   x    Chest Pain   x    Shortness of breath  x  On and off; nothing currently or today.    Rash   x    Other    x      Symptom duration:  3 days    Symptom severity:  mild    Treatments tried:  zyrtec for the past week with no relief of symptoms    Contacts:  none known.      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 Patient denies asthma, but states when he was younger he has had neb treatments.       Problem list, Medication list, Allergies, and Medical/Social/Surgical histories reviewed in [facility] and updated as appropriate.      Problem List:    Patient Active Problem List    Diagnosis Date Noted     Moderate episode of recurrent major depressive disorder (H) 12/18/2017     Priority: Medium     Anxiety 08/24/2015     Priority: Medium     OCD (obsessive compulsive disorder) 08/24/2015     Priority: Medium     Tourette's disorder 11/16/2011     Priority: Medium     mild       Behavior problems 02/22/2011     Priority: Medium     ADHD (attention deficit hyperactivity disorder) 02/22/2011     Priority: Medium     He will call in 3 months for refills and I will put 3 months worth of refills at the .  He must be seen at least every 6 months.          Past Medical History:    History reviewed. No pertinent past medical history.    Past Surgical History:    Past Surgical History:   Procedure Laterality Date     ADENOIDECTOMY  2001     PE TUBES         Family History:    Family History   Problem  Relation Age of Onset     Arthritis Father      Prostate Cancer Maternal Grandfather      Thyroid Disease Paternal Grandmother      Thyroid Disease Mother      Breast Cancer Maternal Grandmother      CANCER Paternal Grandfather      Bladder cancer       Social History:  Marital Status:  Single [1]  Social History   Substance Use Topics     Smoking status: Current Some Day Smoker     Types: Cigarettes     Smokeless tobacco: Never Used      Comment: Smokes once per week.     Alcohol use No        Medications:      albuterol (PROAIR HFA/PROVENTIL HFA/VENTOLIN HFA) 108 (90 Base) MCG/ACT Inhaler   DULoxetine HCl (CYMBALTA PO)         Review of Systems   Constitutional: Negative for fatigue and fever.   HENT: Positive for congestion and rhinorrhea. Negative for sinus pain, sinus pressure, sore throat and trouble swallowing.    Respiratory: Positive for cough and shortness of breath (occasionally; nothing currently ). Negative for wheezing.    Cardiovascular: Negative for chest pain and palpitations.   Gastrointestinal: Negative for abdominal pain, diarrhea, nausea and vomiting.   Skin: Negative for rash.   Neurological: Negative for headaches.   All other systems reviewed and are negative.      Physical Exam   BP: 117/73  Pulse: 80  Temp: 98.7  F (37.1  C)  Resp: 18  Weight: 104.3 kg (230 lb)  SpO2: 96 %      Physical Exam       Constitutional: healthy, alert, no distress and cooperative   Cardiovascular: RRR. No murmurs, clicks gallops or rub  Respiratory: Lungs clear to auscultation bilaterally, but slightly decreased breath sounds to right lower and middle lobes. No rales, rhonchi, wheezing appreciated. No accessory muscle use or retractions.   ENT: bilateral TM normal without fluid or infection, tonsils red, enlarged, without exudate present, sinuses nontender, post nasal drip noted and nasal mucosa congested  Abdomen: Abdomen soft, non-tender. BS normal. No masses, organomegaly  SKIN: no suspicious lesions or  bert    Results for orders placed or performed during the hospital encounter of 05/22/18 (from the past 24 hour(s))   Rapid strep group A screen POCT   Result Value Ref Range    Rapid Strep A Screen Negative neg    Internal QC OK Yes        Albuterol neb treatment done in office. Positive neb treatment patient states he is feeling better; on exam lungs clear to auscultation bilaterally with increased breath sounds throughout, no wheezing, rhonchi, or rales.     ED Course     ED Course     Procedures              Critical Care time:  none               Results for orders placed or performed during the hospital encounter of 05/22/18 (from the past 24 hour(s))   Rapid strep group A screen POCT   Result Value Ref Range    Rapid Strep A Screen Negative neg    Internal QC OK Yes    XR Chest 2 Views    Narrative    CHEST TWO VIEW   5/22/2018 5:38 PM     HISTORY: Shortness of breath, cough.     COMPARISON: None.      Impression    IMPRESSION: No acute cardiopulmonary disease.    BLANCA MURILLO MD       Medications   albuterol neb solution 2.5 mg (2.5 mg Nebulization Given 5/22/18 1727)       Assessments & Plan (with Medical Decision Making)     I have reviewed the nursing notes.    I have reviewed the findings, diagnosis, plan and need for follow up with the patient.    Use medication as directed; follow up with primary care provider for recheck in 1 week.     Continue over the counter antihistamine daily     Patient to return to Emergency Room if fevers, productive cough, shortness of breath, chest pain occur.     Discharge Medication List as of 5/22/2018  5:56 PM      START taking these medications    Details   albuterol (PROAIR HFA/PROVENTIL HFA/VENTOLIN HFA) 108 (90 Base) MCG/ACT Inhaler Inhale 1-2 puffs into the lungs every 4 hours as needed, Disp-1 Inhaler, R-0, E-Prescribe             Final diagnoses:   Acute seasonal allergic rhinitis, unspecified trigger   SOB (shortness of breath) - improved with albuterol neb  treatment.       5/22/2018   [facility] EMERGENCY DEPARTMENT     Jalyn Pride PA-C  05/22/18 2133

## 2018-05-24 LAB
BACTERIA SPEC CULT: NORMAL
SPECIMEN SOURCE: NORMAL

## 2018-12-04 RX ORDER — DULOXETIN HYDROCHLORIDE 30 MG/1
CAPSULE, DELAYED RELEASE ORAL
Qty: 60 CAPSULE | OUTPATIENT
Start: 2018-12-04

## 2018-12-04 NOTE — TELEPHONE ENCOUNTER
"Requested Prescriptions   Pending Prescriptions Disp Refills     DULoxetine (CYMBALTA) 30 MG capsule  Medication is Historical   LOV: 12/15/2017 with Dr.K Greenwood  60 capsule     Serotonin-Norepinephrine Reuptake Inhibitors  Failed    12/4/2018  3:07 PM       Failed - Recent (12 mo) or future (30 days) visit within the authorizing provider's specialty    Patient had office visit in the last 12 months or has a visit in the next 30 days with authorizing provider or within the authorizing provider's specialty.  See \"Patient Info\" tab in inbasket, or \"Choose Columns\" in Meds & Orders section of the refill encounter.             Passed - Blood pressure under 140/90 in past 12 months    BP Readings from Last 3 Encounters:   05/22/18 117/73   04/27/18 136/81   12/15/17 128/65                Passed - Patient is age 18 or older          "

## 2018-12-04 NOTE — TELEPHONE ENCOUNTER
Called pt as his last OV was with Dr. Sanford Greenowod 12/15/17.  He has a whole bottle left of duloxetine so refill not needed.  Appt made for Monday 12/10/18 with Dr. Boone.    Carrol ALLEN RN

## 2018-12-10 ENCOUNTER — OFFICE VISIT (OUTPATIENT)
Dept: FAMILY MEDICINE | Facility: CLINIC | Age: 23
End: 2018-12-10
Payer: COMMERCIAL

## 2018-12-10 VITALS
BODY MASS INDEX: 33.18 KG/M2 | WEIGHT: 245 LBS | SYSTOLIC BLOOD PRESSURE: 122 MMHG | RESPIRATION RATE: 18 BRPM | DIASTOLIC BLOOD PRESSURE: 60 MMHG | TEMPERATURE: 97.8 F | HEART RATE: 66 BPM | HEIGHT: 72 IN

## 2018-12-10 DIAGNOSIS — F33.1 MODERATE EPISODE OF RECURRENT MAJOR DEPRESSIVE DISORDER (H): Primary | ICD-10-CM

## 2018-12-10 PROCEDURE — 99213 OFFICE O/P EST LOW 20 MIN: CPT | Performed by: FAMILY MEDICINE

## 2018-12-10 RX ORDER — DULOXETIN HYDROCHLORIDE 30 MG/1
30 CAPSULE, DELAYED RELEASE ORAL DAILY
Qty: 90 CAPSULE | Refills: 3 | Status: SHIPPED | OUTPATIENT
Start: 2018-12-10 | End: 2019-10-30

## 2018-12-10 ASSESSMENT — ANXIETY QUESTIONNAIRES
GAD7 TOTAL SCORE: 8
6. BECOMING EASILY ANNOYED OR IRRITABLE: NEARLY EVERY DAY
2. NOT BEING ABLE TO STOP OR CONTROL WORRYING: SEVERAL DAYS
1. FEELING NERVOUS, ANXIOUS, OR ON EDGE: NOT AT ALL
5. BEING SO RESTLESS THAT IT IS HARD TO SIT STILL: NEARLY EVERY DAY
3. WORRYING TOO MUCH ABOUT DIFFERENT THINGS: SEVERAL DAYS
7. FEELING AFRAID AS IF SOMETHING AWFUL MIGHT HAPPEN: NOT AT ALL
IF YOU CHECKED OFF ANY PROBLEMS ON THIS QUESTIONNAIRE, HOW DIFFICULT HAVE THESE PROBLEMS MADE IT FOR YOU TO DO YOUR WORK, TAKE CARE OF THINGS AT HOME, OR GET ALONG WITH OTHER PEOPLE: SOMEWHAT DIFFICULT

## 2018-12-10 ASSESSMENT — PATIENT HEALTH QUESTIONNAIRE - PHQ9
5. POOR APPETITE OR OVEREATING: NOT AT ALL
SUM OF ALL RESPONSES TO PHQ QUESTIONS 1-9: 10

## 2018-12-10 ASSESSMENT — MIFFLIN-ST. JEOR: SCORE: 2150.34

## 2018-12-10 NOTE — PATIENT INSTRUCTIONS
Thank you for choosing [facility].  You may be receiving a survey in the mail from [facility] regarding your visit today.  Please take a few minutes to complete and return the survey to let us know how we are doing.      Our Clinic hours are:  Mondays    7:20 am - 7 pm  Tues - Fri  7:20 am - 5 pm    Clinic Phone: [phone]    The clinic lab opens at 7:30 am Mon - Fri and appointments are required.    [facility]. [phone]  Monday  8 am - 7pm  Tues - Fri 8 am - 5:30 pm

## 2018-12-10 NOTE — LETTER
My Depression Action Plan  Name: Daniel Thomas   Date of Birth 1995  Date: 12/10/2018    My doctor: Stiven Boone   My clinic: [facility]  [address]  [facility] [address]  [phone]          GREEN    ZONE   Good Control    What it looks like:     Things are going generally well. You have normal up s and down s. You may even feel depressed from time to time, but bad moods usually last less than a day.   What you need to do:  1. Continue to care for yourself (see self care plan)  2. Check your depression survival kit and update it as needed  3. Follow your physician s recommendations including any medication.  4. Do not stop taking medication unless you consult with your physician first.           YELLOW         ZONE Getting Worse    What it looks like:     Depression is starting to interfere with your life.     It may be hard to get out of bed; you may be starting to isolate yourself from others.    Symptoms of depression are starting to last most all day and this has happened for several days.     You may have suicidal thoughts but they are not constant.   What you need to do:     1. Call your care team, your response to treatment will improve if you keep your care team informed of your progress. Yellow periods are signs an adjustment may need to be made.     2. Continue your self-care, even if you have to fake it!    3. Talk to someone in your support network    4. Open up your depression survival kit           RED    ZONE Medical Alert - Get Help    What it looks like:     Depression is seriously interfering with your life.     You may experience these or other symptoms: You can t get out of bed most days, can t work or engage in other necessary activities, you have trouble taking care of basic hygiene, or basic responsibilities, thoughts of suicide or death that will not go away, self-injurious behavior.     What you need to do:  1. Call your care team  and request a same-day appointment. If they are not available (weekends or after hours) call your local crisis line, emergency room or 911.            Depression Self Care Plan / Survival Kit    Self-Care for Depression  Here s the deal. Your body and mind are really not as separate as most people think.  What you do and think affects how you feel and how you feel influences what you do and think. This means if you do things that people who feel good do, it will help you feel better.  Sometimes this is all it takes.  There is also a place for medication and therapy depending on how severe your depression is, so be sure to consult with your medical provider and/ or Behavioral Health Consultant if your symptoms are worsening or not improving.     In order to better manage my stress, I will:    Exercise  Get some form of exercise, every day. This will help reduce pain and release endorphins, the  feel good  chemicals in your brain. This is almost as good as taking antidepressants!  This is not the same as joining a gym and then never going! (they count on that by the way ) It can be as simple as just going for a walk or doing some gardening, anything that will get you moving.      Hygiene   Maintain good hygiene (Get out of bed in the morning, Make your bed, Brush your teeth, Take a shower, and Get dressed like you were going to work, even if you are unemployed).  If your clothes don't fit try to get ones that do.    Diet  I will strive to eat foods that are good for me, drink plenty of water, and avoid excessive sugar, caffeine, alcohol, and other mood-altering substances.  Some foods that are helpful in depression are: complex carbohydrates, B vitamins, flaxseed, fish or fish oil, fresh fruits and vegetables.    Psychotherapy  I agree to participate in Individual Therapy (if recommended).    Medication  If prescribed medications, I agree to take them.  Missing doses can result in serious side effects.  I understand  that drinking alcohol, or other illicit drug use, may cause potential side effects.  I will not stop my medication abruptly without first discussing it with my provider.    Staying Connected With Others  I will stay in touch with my friends, family members, and my primary care provider/team.    Use your imagination  Be creative.  We all have a creative side; it doesn t matter if it s oil painting, sand castles, or mud pies! This will also kick up the endorphins.    Witness Beauty  (AKA stop and smell the roses) Take a look outside, even in mid-winter. Notice colors, textures. Watch the squirrels and birds.     Service to others  Be of service to others.  There is always someone else in need.  By helping others we can  get out of ourselves  and remember the really important things.  This also provides opportunities for practicing all the other parts of the program.    Humor  Laugh and be silly!  Adjust your TV habits for less news and crime-drama and more comedy.    Control your stress  Try breathing deep, massage therapy, biofeedback, and meditation. Find time to relax each day.     My support system    Clinic Contact:  Phone number:    Contact 1:  Phone number:    Contact 2:  Phone number:    Yazid[employer]/:  Phone number:    Therapist:  Phone number:    Local crisis center:    Phone number:    Other community support:  Phone number:

## 2018-12-10 NOTE — PROGRESS NOTES
"  SUBJECTIVE:   Daniel Thomas is a 23 year old male who presents to clinic today for the following health issues:      Depression and Anxiety Follow-Up    Status since last visit: Improved     Other associated symptoms:None    Complicating factors:     Significant life event: No     Current substance abuse: None    PHQ 12/10/2018   PHQ-9 Total Score 10   Q9: Suicide Ideation Not at all     BILLY-7 SCORE 12/15/2017 12/10/2018   Total Score 16 8       PHQ-9  English  PHQ-9   Any Language  BILLY-7  Suicide Assessment Five-step Evaluation and Treatment (SAFE-T)    Amount of exercise or physical activity: None    Problems taking medications regularly: No    Medication side effects: none    Diet: regular (no restrictions)            Problem list and histories reviewed & adjusted, as indicated.  Additional history:         Reviewed and updated as needed this visit by clinical staff  Tobacco  Allergies  Meds  Med Hx  Surg Hx  Fam Hx  Soc Hx      Reviewed and updated as needed this visit by Provider      OBJECTIVE:  /60 (BP Location: Right arm, Patient Position: Chair, Cuff Size: Adult Large)   Pulse 66   Temp 97.8  F (36.6  C)   Resp 18   Ht 1.822 m (5' 11.75\")   Wt 111.1 kg (245 lb)   BMI 33.46 kg/m    LUNGS: clear to auscultation, normal breath sounds  CV: RRR without murmur  ABD: BS+, soft, nontender, no masses, no hepatosplenomegaly  EXTREMITIES: without joint tenderness, swelling or erythema.  No muscle tenderness or abnormality.  SKIN: No rashes or abnormalities  NEURO:non focal exam    ASSESSMENT:  Moderate episode of recurrent major depressive disorder (H)    PLAN:  Orders Placed This Encounter     DEPRESSION ACTION PLAN (DAP)     DULoxetine (CYMBALTA) 30 MG capsule         "

## 2018-12-11 ASSESSMENT — ANXIETY QUESTIONNAIRES: GAD7 TOTAL SCORE: 8

## 2019-01-04 ENCOUNTER — MYC REFILL (OUTPATIENT)
Dept: FAMILY MEDICINE | Facility: CLINIC | Age: 24
End: 2019-01-04

## 2019-01-04 DIAGNOSIS — F33.1 MODERATE EPISODE OF RECURRENT MAJOR DEPRESSIVE DISORDER (H): ICD-10-CM

## 2019-01-05 ENCOUNTER — MYC REFILL (OUTPATIENT)
Dept: FAMILY MEDICINE | Facility: CLINIC | Age: 24
End: 2019-01-05

## 2019-01-05 DIAGNOSIS — F33.1 MODERATE EPISODE OF RECURRENT MAJOR DEPRESSIVE DISORDER (H): ICD-10-CM

## 2019-01-05 RX ORDER — DULOXETIN HYDROCHLORIDE 30 MG/1
30 CAPSULE, DELAYED RELEASE ORAL DAILY
Qty: 90 CAPSULE | Refills: 3 | Status: CANCELLED | OUTPATIENT
Start: 2019-01-05

## 2019-01-07 RX ORDER — DULOXETIN HYDROCHLORIDE 30 MG/1
30 CAPSULE, DELAYED RELEASE ORAL DAILY
Qty: 90 CAPSULE | Refills: 3 | OUTPATIENT
Start: 2019-01-07

## 2019-01-07 NOTE — TELEPHONE ENCOUNTER
My charted patient back already has refills on this and to contact the [facility] pharmacy. Removed using reason: refill too soon.  MICHAEL Almonte

## 2019-03-12 ENCOUNTER — MYC REFILL (OUTPATIENT)
Dept: FAMILY MEDICINE | Facility: CLINIC | Age: 24
End: 2019-03-12

## 2019-03-12 DIAGNOSIS — F33.1 MODERATE EPISODE OF RECURRENT MAJOR DEPRESSIVE DISORDER (H): ICD-10-CM

## 2019-03-13 RX ORDER — DULOXETIN HYDROCHLORIDE 30 MG/1
30 CAPSULE, DELAYED RELEASE ORAL DAILY
Qty: 90 CAPSULE | Refills: 3 | OUTPATIENT
Start: 2019-03-13

## 2019-03-19 ENCOUNTER — MYC REFILL (OUTPATIENT)
Dept: FAMILY MEDICINE | Facility: CLINIC | Age: 24
End: 2019-03-19

## 2019-03-19 DIAGNOSIS — F33.1 MODERATE EPISODE OF RECURRENT MAJOR DEPRESSIVE DISORDER (H): ICD-10-CM

## 2019-03-21 RX ORDER — DULOXETIN HYDROCHLORIDE 30 MG/1
30 CAPSULE, DELAYED RELEASE ORAL DAILY
Qty: 90 CAPSULE | Refills: 3 | OUTPATIENT
Start: 2019-03-21

## 2019-03-21 NOTE — TELEPHONE ENCOUNTER
Patient has refills. My charted the patient back to contact the pharmacy for this.    MICHAEL Almonte

## 2019-10-30 ENCOUNTER — OFFICE VISIT (OUTPATIENT)
Dept: FAMILY MEDICINE | Facility: CLINIC | Age: 24
End: 2019-10-30
Payer: COMMERCIAL

## 2019-10-30 VITALS
SYSTOLIC BLOOD PRESSURE: 138 MMHG | WEIGHT: 251 LBS | HEART RATE: 82 BPM | OXYGEN SATURATION: 96 % | TEMPERATURE: 96.8 F | BODY MASS INDEX: 34 KG/M2 | HEIGHT: 72 IN | DIASTOLIC BLOOD PRESSURE: 78 MMHG | RESPIRATION RATE: 18 BRPM

## 2019-10-30 DIAGNOSIS — F33.1 MODERATE EPISODE OF RECURRENT MAJOR DEPRESSIVE DISORDER (H): ICD-10-CM

## 2019-10-30 DIAGNOSIS — Z11.3 SCREEN FOR STD (SEXUALLY TRANSMITTED DISEASE): Primary | ICD-10-CM

## 2019-10-30 PROCEDURE — 86780 TREPONEMA PALLIDUM: CPT | Performed by: FAMILY MEDICINE

## 2019-10-30 PROCEDURE — 99213 OFFICE O/P EST LOW 20 MIN: CPT | Performed by: FAMILY MEDICINE

## 2019-10-30 PROCEDURE — 87491 CHLMYD TRACH DNA AMP PROBE: CPT | Performed by: FAMILY MEDICINE

## 2019-10-30 PROCEDURE — 86706 HEP B SURFACE ANTIBODY: CPT | Performed by: FAMILY MEDICINE

## 2019-10-30 PROCEDURE — 87340 HEPATITIS B SURFACE AG IA: CPT | Performed by: FAMILY MEDICINE

## 2019-10-30 PROCEDURE — 87389 HIV-1 AG W/HIV-1&-2 AB AG IA: CPT | Performed by: FAMILY MEDICINE

## 2019-10-30 PROCEDURE — 36415 COLL VENOUS BLD VENIPUNCTURE: CPT | Performed by: FAMILY MEDICINE

## 2019-10-30 PROCEDURE — 87591 N.GONORRHOEAE DNA AMP PROB: CPT | Performed by: FAMILY MEDICINE

## 2019-10-30 RX ORDER — DULOXETIN HYDROCHLORIDE 30 MG/1
60 CAPSULE, DELAYED RELEASE ORAL DAILY
Qty: 180 CAPSULE | Refills: 0 | Status: SHIPPED | OUTPATIENT
Start: 2019-10-30 | End: 2020-01-28

## 2019-10-30 ASSESSMENT — ANXIETY QUESTIONNAIRES
IF YOU CHECKED OFF ANY PROBLEMS ON THIS QUESTIONNAIRE, HOW DIFFICULT HAVE THESE PROBLEMS MADE IT FOR YOU TO DO YOUR WORK, TAKE CARE OF THINGS AT HOME, OR GET ALONG WITH OTHER PEOPLE: SOMEWHAT DIFFICULT
GAD7 TOTAL SCORE: 13
5. BEING SO RESTLESS THAT IT IS HARD TO SIT STILL: NEARLY EVERY DAY
7. FEELING AFRAID AS IF SOMETHING AWFUL MIGHT HAPPEN: NOT AT ALL
3. WORRYING TOO MUCH ABOUT DIFFERENT THINGS: NOT AT ALL
6. BECOMING EASILY ANNOYED OR IRRITABLE: NEARLY EVERY DAY
1. FEELING NERVOUS, ANXIOUS, OR ON EDGE: NEARLY EVERY DAY
2. NOT BEING ABLE TO STOP OR CONTROL WORRYING: SEVERAL DAYS

## 2019-10-30 ASSESSMENT — PATIENT HEALTH QUESTIONNAIRE - PHQ9
SUM OF ALL RESPONSES TO PHQ QUESTIONS 1-9: 15
5. POOR APPETITE OR OVEREATING: NEARLY EVERY DAY

## 2019-10-30 ASSESSMENT — MIFFLIN-ST. JEOR: SCORE: 2176.53

## 2019-10-30 NOTE — PROGRESS NOTES
Subjective     Daniel Thomas is a 24 year old male who presents to clinic today for the following health issues:   Patient is a 24-year-old male who comes in today for follow-up on anxiety.  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  We talked about ways we could control his symptoms and we decided would increase the dose a little bit and see if he does well with that. He declined counseling.     Other concerns today patients will like to be tested for STDs.    HPI   Depression and Anxiety Follow-Up    How are you doing with your depression since your last visit? Same     How are you doing with your anxiety since your last visit?  Worsened     Are you having other symptoms that might be associated with depression or anxiety? Yes:  Panic attacks     Have you had a significant life event? No     Do you have any concerns with your use of alcohol or other drugs? No    Social History     Tobacco Use     Smoking status: Former Smoker     Packs/day: 0.00     Types: Cigarettes     Last attempt to quit: 12/10/2017     Years since quittin.8     Smokeless tobacco: Never Used     Tobacco comment: patient vapes    Substance Use Topics     Alcohol use: Yes     Comment: socially      Drug use: No     PHQ 12/10/2018   PHQ-9 Total Score 10   Q9: Thoughts of better off dead/self-harm past 2 weeks Not at all     BILLY-7 SCORE 12/15/2017 12/10/2018   Total Score 16 8     Last PHQ-9 12/10/2018   1.  Little interest or pleasure in doing things 0   2.  Feeling down, depressed, or hopeless 0   3.  Trouble falling or staying asleep, or sleeping too much 1   4.  Feeling tired or having little energy 1   5.  Poor  appetite or overeating 1   6.  Feeling bad about yourself 1   7.  Trouble concentrating 3   8.  Moving slowly or restless 3   Q9: Thoughts of better off dead/self-harm past 2 weeks 0   PHQ-9 Total Score 10   Difficulty at work, home, or with people Somewhat difficult     BILLY-7  12/10/2018   1. Feeling nervous, anxious, or on edge 0   2. Not being able to stop or control worrying 1   3. Worrying too much about different things 1   4. Trouble relaxing 0   5. Being so restless that it is hard to sit still 3   6. Becoming easily annoyed or irritable 3   7. Feeling afraid, as if something awful might happen 0   BILLY-7 Total Score 8   If you checked any problems, how difficult have they made it for you to do your work, take care of things at home, or get along with other people? Somewhat difficult     In the past two weeks have you had thoughts of suicide or self-harm?  No.    Do you have concerns about your personal safety or the safety of others?   No    Suicide Assessment Five-step Evaluation and Treatment (SAFE-T)      How many servings of fruits and vegetables do you eat daily?  0-1    On average, how many sweetened beverages do you drink each day (soda, juice, sweet tea, etc)?   4    How many days per week do you miss taking your medication? Forgets every once in while     RESPIRATORY SYMPTOMS      Duration: 2-3 weeks     Description  nasal congestion, sore throat, cough, ear pain bilateral, fatigue/malaise and hoarse voice    Severity: moderate    Accompanying signs and symptoms: None    History (predisposing factors):  none    Precipitating or alleviating factors: None    Therapies tried and outcome:  none        Patient Active Problem List   Diagnosis     Behavior problems     ADHD (attention deficit hyperactivity disorder)     Tourette's disorder     Anxiety     OCD (obsessive compulsive disorder)     Moderate episode of recurrent major depressive disorder (H)     Past Surgical History:   Procedure Laterality  Date     ADENOIDECTOMY       PE TUBES         Social History     Tobacco Use     Smoking status: Former Smoker     Packs/day: 0.00     Types: Cigarettes     Last attempt to quit: 12/10/2017     Years since quittin.8     Smokeless tobacco: Never Used     Tobacco comment: patient vapes    Substance Use Topics     Alcohol use: Yes     Comment: socially      Family History   Problem Relation Age of Onset     Arthritis Father      Prostate Cancer Maternal Grandfather      Thyroid Disease Paternal Grandmother      Thyroid Disease Mother      Breast Cancer Maternal Grandmother      Cancer Paternal Grandfather         Bladder cancer         Current Outpatient Medications   Medication Sig Dispense Refill     DULoxetine (CYMBALTA) 30 MG capsule Take 2 capsules (60 mg) by mouth daily 180 capsule 0     albuterol (PROAIR HFA/PROVENTIL HFA/VENTOLIN HFA) 108 (90 Base) MCG/ACT Inhaler Inhale 1-2 puffs into the lungs every 4 hours as needed (Patient not taking: Reported on 12/10/2018) 1 Inhaler 0     Allergies   Allergen Reactions     Seasonal Allergies      Runny nose, itchy eyes     BP Readings from Last 3 Encounters:   10/30/19 138/78   12/10/18 122/60   18 117/73    Wt Readings from Last 3 Encounters:   10/30/19 113.9 kg (251 lb)   12/10/18 111.1 kg (245 lb)   18 104.3 kg (230 lb)                      Reviewed and updated as needed this visit by Provider         Review of Systems   ROS COMP: Constitutional, HEENT, cardiovascular, pulmonary, gi and gu systems are negative, except as otherwise noted.      Objective    /78   Pulse 82   Temp 96.8  F (36  C) (Tympanic)   Resp 18   Ht 1.829 m (6')   Wt 113.9 kg (251 lb)   SpO2 96%   BMI 34.04 kg/m    Body mass index is 34.04 kg/m .  Physical Exam   GENERAL: healthy, alert and no distress  EYES: Eyes grossly normal to inspection, PERRL and conjunctivae and sclerae normal  HENT: ear canals and TM's normal, nose and mouth without ulcers or lesions  NECK:  no adenopathy, no asymmetry, masses, or scars and thyroid normal to palpation  RESP: lungs clear to auscultation - no rales, rhonchi or wheezes  CV: regular rate and rhythm, normal S1 S2, no S3 or S4, no murmur, click or rub, no peripheral edema and peripheral pulses strong  SKIN: no suspicious lesions or rashes  PSYCH: mentation appears normal, affect flat, judgement and insight intact and appearance well groomed    Diagnostic Test Results:  Pending         Assessment & Plan     1. Moderate episode of recurrent major depressive disorder (H)  Increased dose to 60 mg   - DULoxetine (CYMBALTA) 30 MG capsule; Take 2 capsules (60 mg) by mouth daily  Dispense: 180 capsule; Refill: 0    2. Screen for STD (sexually transmitted disease)  Patient will be notified of results  - NEISSERIA GONORRHOEA PCR  - CHLAMYDIA TRACHOMATIS PCR  - HIV Antigen Antibody Combo  - Hepatitis B Surface Antibody  - Hepatitis B surface antigen  - Treponema Abs w Reflex to RPR and Titer          FUTURE APPOINTMENTS:       - Follow-up visit in one month or sooner as needed  Patient Instructions       Patient Education     Treating Anxiety Disorders with Medicine  An anxiety disorder can make you feel nervous or apprehensive, even without a clear reason. In people age 65 and older, generalized anxiety disorder is one of the most commonly diagnosed anxiety disorders. Many times it occurs with depression. Certain anxiety disorders can cause intense feelings of fear or panic. You may even have physical symptoms such as a racing heartbeat, sweating, or dizziness. If you have these feelings, you don t have to suffer anymore. Treatment to help you overcome your fears will likely include therapy (also called counseling). Medicine may also be prescribed to help control your symptoms.    Medicines  Certain medicines may be prescribed to help control your symptoms. So you may feel less anxious. You may also feel able to move forward with therapy. At first,  medicines and dosages may need to be adjusted to find what works best for you. Try to be patient. Tell your healthcare provider how a medicine makes you feel. This way, you can work together to find the treatment that s best for you. Keep in mind that medicines can have side effects. Talk with your provider about any side effects that are bothering you. Changing the dose or type of medicine may help. Don t stop taking medicine on your own. That can cause symptoms to come back.    Anti-anxiety medicine. This medicine eases symptoms and helps you relax. Your healthcare provider will explain when and how to use it. It may be prescribed for use before situations that make you anxious. You may also be told to take medicine on a regular schedule. Anti-anxiety medicine may make you feel a little sleepy or  out of it.  Don t drive a car or operate machinery while on this medicine, until you know how it affects you.  Caution  Never use alcohol or other drugs with anti-anxiety medicines. This could result in loss of muscular control, sedation, coma, or death. Also, use only the amount of medicine prescribed for you. If you think you may have taken too much, get emergency care right away.     Antidepressant medicine. This kind of medicine is often used to treat anxiety, even if you aren t depressed. An antidepressant helps balance out brain chemicals. This helps keep anxiety under control. This medicine is taken on a schedule. It takes a few weeks to start working. If you don t notice a change at first, you may just need more time. But if you don t notice results after the first few weeks, tell your provider.  Keep taking medicines as prescribed  Never change your dosage, share or use another person's medicine, or stop taking your medicines without talking to your healthcare provider first. Keep the following in mind:    Some medicines must be taken on a schedule. Make this part of your daily routine. For instance, always take  your pill before brushing your teeth. A pillbox can help you remember if you ve taken your medicine each day.    Medicines are often taken for 6 to 12 months. Your healthcare provider will then evaluate whether you need to stay on them. Many people who have also had therapy may no longer need medicine to manage anxiety.    You may need to stop taking medicine slowly to give your body time to adjust. When it s time to stop, your healthcare provider will tell you more. Remember: Never stop taking your medicine without talking to your provider first.    If symptoms return, you may need to start taking medicines again. This isn t your fault. It s just the nature of your anxiety disorder.  Special concerns    Side effects. Medicines may cause side effects. Ask your healthcare provider or pharmacist what you can expect. They may have ideas for avoiding some side effects.    Sexual problems. Some antidepressants can affect your desire for sex or your ability to have an orgasm. A change in dosage or medicine often solves the problem. If you have a sexual side effect that concerns you, tell your healthcare provider.    Addiction. If you ve never had a problem with drugs or alcohol, you may not have a problem with medicines used to treat anxiety disorders. But always discuss the medicines with your healthcare provider before taking them. If you have a history of addiction, you may not be able to use certain medicines used to treat anxiety disorders.    Medicine interactions. Always check with your pharmacist before using any over-the-counter medicines, including herbal supplements.   Date Last Reviewed: 5/1/2017 2000-2018 [employer]. [address] [place] [address], [place] [address]. All rights reserved. This information is not intended as a substitute for professional medical care. Always follow your healthcare professional's instructions.               No follow-ups on file.    Tricia Gonzalez  MD  [facility]

## 2019-10-30 NOTE — PATIENT INSTRUCTIONS
Patient Education     Treating Anxiety Disorders with Medicine  An anxiety disorder can make you feel nervous or apprehensive, even without a clear reason. In people age 65 and older, generalized anxiety disorder is one of the most commonly diagnosed anxiety disorders. Many times it occurs with depression. Certain anxiety disorders can cause intense feelings of fear or panic. You may even have physical symptoms such as a racing heartbeat, sweating, or dizziness. If you have these feelings, you don t have to suffer anymore. Treatment to help you overcome your fears will likely include therapy (also called counseling). Medicine may also be prescribed to help control your symptoms.    Medicines  Certain medicines may be prescribed to help control your symptoms. So you may feel less anxious. You may also feel able to move forward with therapy. At first, medicines and dosages may need to be adjusted to find what works best for you. Try to be patient. Tell your healthcare provider how a medicine makes you feel. This way, you can work together to find the treatment that s best for you. Keep in mind that medicines can have side effects. Talk with your provider about any side effects that are bothering you. Changing the dose or type of medicine may help. Don t stop taking medicine on your own. That can cause symptoms to come back.    Anti-anxiety medicine. This medicine eases symptoms and helps you relax. Your healthcare provider will explain when and how to use it. It may be prescribed for use before situations that make you anxious. You may also be told to take medicine on a regular schedule. Anti-anxiety medicine may make you feel a little sleepy or  out of it.  Don t drive a car or operate machinery while on this medicine, until you know how it affects you.  Caution  Never use alcohol or other drugs with anti-anxiety medicines. This could result in loss of muscular control, sedation, coma, or death. Also, use only the  amount of medicine prescribed for you. If you think you may have taken too much, get emergency care right away.     Antidepressant medicine. This kind of medicine is often used to treat anxiety, even if you aren t depressed. An antidepressant helps balance out brain chemicals. This helps keep anxiety under control. This medicine is taken on a schedule. It takes a few weeks to start working. If you don t notice a change at first, you may just need more time. But if you don t notice results after the first few weeks, tell your provider.  Keep taking medicines as prescribed  Never change your dosage, share or use another person's medicine, or stop taking your medicines without talking to your healthcare provider first. Keep the following in mind:    Some medicines must be taken on a schedule. Make this part of your daily routine. For instance, always take your pill before brushing your teeth. A pillbox can help you remember if you ve taken your medicine each day.    Medicines are often taken for 6 to 12 months. Your healthcare provider will then evaluate whether you need to stay on them. Many people who have also had therapy may no longer need medicine to manage anxiety.    You may need to stop taking medicine slowly to give your body time to adjust. When it s time to stop, your healthcare provider will tell you more. Remember: Never stop taking your medicine without talking to your provider first.    If symptoms return, you may need to start taking medicines again. This isn t your fault. It s just the nature of your anxiety disorder.  Special concerns    Side effects. Medicines may cause side effects. Ask your healthcare provider or pharmacist what you can expect. They may have ideas for avoiding some side effects.    Sexual problems. Some antidepressants can affect your desire for sex or your ability to have an orgasm. A change in dosage or medicine often solves the problem. If you have a sexual side effect that  concerns you, tell your healthcare provider.    Addiction. If you ve never had a problem with drugs or alcohol, you may not have a problem with medicines used to treat anxiety disorders. But always discuss the medicines with your healthcare provider before taking them. If you have a history of addiction, you may not be able to use certain medicines used to treat anxiety disorders.    Medicine interactions. Always check with your pharmacist before using any over-the-counter medicines, including herbal supplements.   Date Last Reviewed: 5/1/2017 2000-2018 [employer]. [address] [place] [address], [place] [address]. All rights reserved. This information is not intended as a substitute for professional medical care. Always follow your healthcare professional's instructions.

## 2019-10-30 NOTE — LETTER
November 1, 2019      Daniel Thomas  [address]   [facility] [id number]        Dear ,    We are writing to inform you of your test results.    All of your recent lab tests are within normal limits or negative. Follow up in clinic as needed.     If you have any questions or concerns, please call the clinic at the number listed above.       Sincerely,        Tricia Gonzalez MD

## 2019-10-31 LAB
C TRACH DNA SPEC QL NAA+PROBE: NEGATIVE
HBV SURFACE AB SERPL IA-ACNC: 9.3 M[IU]/ML
HBV SURFACE AG SERPL QL IA: NONREACTIVE
HIV 1+2 AB+HIV1 P24 AG SERPL QL IA: NONREACTIVE
N GONORRHOEA DNA SPEC QL NAA+PROBE: NEGATIVE
SPECIMEN SOURCE: NORMAL
SPECIMEN SOURCE: NORMAL
T PALLIDUM AB SER QL: NONREACTIVE

## 2019-10-31 ASSESSMENT — ANXIETY QUESTIONNAIRES: GAD7 TOTAL SCORE: 13

## 2019-11-01 NOTE — RESULT ENCOUNTER NOTE
Please inform patient that test result was within normal parameters.   Thank you.     Tricia Gonzalez M.D.

## 2019-11-03 ENCOUNTER — HEALTH MAINTENANCE LETTER (OUTPATIENT)
Age: 24
End: 2019-11-03

## 2020-01-28 ENCOUNTER — OFFICE VISIT (OUTPATIENT)
Dept: FAMILY MEDICINE | Facility: CLINIC | Age: 25
End: 2020-01-28
Payer: COMMERCIAL

## 2020-01-28 VITALS
WEIGHT: 250 LBS | TEMPERATURE: 97.4 F | SYSTOLIC BLOOD PRESSURE: 118 MMHG | HEART RATE: 94 BPM | RESPIRATION RATE: 20 BRPM | OXYGEN SATURATION: 97 % | DIASTOLIC BLOOD PRESSURE: 64 MMHG | HEIGHT: 72 IN | BODY MASS INDEX: 33.86 KG/M2

## 2020-01-28 DIAGNOSIS — F41.9 ANXIETY: ICD-10-CM

## 2020-01-28 DIAGNOSIS — F42.9 OBSESSIVE-COMPULSIVE DISORDER, UNSPECIFIED TYPE: ICD-10-CM

## 2020-01-28 DIAGNOSIS — F33.1 MODERATE EPISODE OF RECURRENT MAJOR DEPRESSIVE DISORDER (H): Primary | ICD-10-CM

## 2020-01-28 PROCEDURE — 99214 OFFICE O/P EST MOD 30 MIN: CPT | Performed by: FAMILY MEDICINE

## 2020-01-28 RX ORDER — FLUOXETINE 10 MG/1
CAPSULE ORAL
Qty: 60 CAPSULE | Refills: 1 | Status: SHIPPED | OUTPATIENT
Start: 2020-01-28 | End: 2020-02-25

## 2020-01-28 ASSESSMENT — MIFFLIN-ST. JEOR: SCORE: 2171.99

## 2020-01-28 ASSESSMENT — ANXIETY QUESTIONNAIRES
7. FEELING AFRAID AS IF SOMETHING AWFUL MIGHT HAPPEN: NEARLY EVERY DAY
IF YOU CHECKED OFF ANY PROBLEMS ON THIS QUESTIONNAIRE, HOW DIFFICULT HAVE THESE PROBLEMS MADE IT FOR YOU TO DO YOUR WORK, TAKE CARE OF THINGS AT HOME, OR GET ALONG WITH OTHER PEOPLE: VERY DIFFICULT
6. BECOMING EASILY ANNOYED OR IRRITABLE: MORE THAN HALF THE DAYS
5. BEING SO RESTLESS THAT IT IS HARD TO SIT STILL: NEARLY EVERY DAY
2. NOT BEING ABLE TO STOP OR CONTROL WORRYING: NEARLY EVERY DAY
GAD7 TOTAL SCORE: 18
3. WORRYING TOO MUCH ABOUT DIFFERENT THINGS: NEARLY EVERY DAY
1. FEELING NERVOUS, ANXIOUS, OR ON EDGE: MORE THAN HALF THE DAYS

## 2020-01-28 ASSESSMENT — PATIENT HEALTH QUESTIONNAIRE - PHQ9
SUM OF ALL RESPONSES TO PHQ QUESTIONS 1-9: 17
5. POOR APPETITE OR OVEREATING: MORE THAN HALF THE DAYS

## 2020-01-28 NOTE — PROGRESS NOTES
Subjective     Daniel Thomas is a 24 year old male who presents to clinic today with Mom Ketty for the following health issues:    HPI   Depression and Anxiety Follow-Up  Showing up late to work and nearly getting fired     How are you doing with your depression since your last visit? Worsened     How are you doing with your anxiety since your last visit?  Worsened    Are you having other symptoms that might be associated with depression or anxiety? No    Have you had a significant life event? No     Do you have any concerns with your use of alcohol or other drugs? No     No therapy recently. Tried two with poor fit last 3 years ago.    Diagnosed with ADHD in grade school.    Started Cymbalta 2-3 years ago with Dr. Greenwood, helpful at first and then off the Adderall too since 2 years ago too. Zoloft, citalopram in past when younger but often forgot medication so didn't seem to help.    Started melatonin with nightmares.  Last time mom was over was 4 months ago to clean then came back over recently and very messy again.    No history of Lisa, no visual or auditory hallucinations.  Has OCD thinking that people who didn't wash hands so then patient needs to wash hands over, can delay getting to work on time sometimes.  No thoughts of paranoia.    Caffeine: 1-2 cans of pop every 1-2 days and energy drink once a week.  Alcohol: past 4 weeks no alcohol on a daily basis.  Past 1-2 drinks per day 16-12ounce Co[employer].  Vapin nicotine   Drug/THC: None    Working for [place] [employer]    Social History     Tobacco Use     Smoking status: Former Smoker     Packs/day: 0.00     Types: Cigarettes     Last attempt to quit: 12/10/2017     Years since quittin.1     Smokeless tobacco: Never Used     Tobacco comment: patient vapes    Substance Use Topics     Alcohol use: Yes     Comment: socially      Drug use: No     PHQ 12/10/2018 10/30/2019   PHQ-9 Total Score 10 15   Q9: Thoughts of better off dead/self-harm past 2 weeks Not at  all Not at all     BILLY-7 SCORE 12/15/2017 12/10/2018 10/30/2019   Total Score 16 8 13       In the past two weeks have you had thoughts of suicide or self-harm?  Yes  In the past two weeks have you thought of a plan or intent to harm yourself? Yes, thought about driving far away and ending it, but has not bought any medications to overdose or gun.  His reason for not going through with any plans are so that his family doesn't have to deal with that devastation.  Do you have concerns about your personal safety or the safety of others?   No    Suicide Assessment Five-step Evaluation and Treatment (SAFE-T)      How many servings of fruits and vegetables do you eat daily?  0-1    On average, how many sweetened beverages do you drink each day (Examples: soda, juice, sweet tea, etc.  Do NOT count diet or artificially sweetened beverages)?   0-1    How many days per week do you exercise enough to make your heart beat faster? 3 or less    How many minutes a day do you exercise enough to make your heart beat faster? 30 - 60  How many days per week do you miss taking your medication? 1    What makes it hard for you to take your medications?  remembering to take    Sleeping problem: x 1 month. Patient states that he is sleeping just fine but his issue is when he needs to wake up. Patient states he doesn't hear his alarm clock. Patient states it happens a few times a week. Patient states when he goes to bed at night, he isn't having problems falling or staying asleep. Has been less than an hour late to work many days due to movitational trouble with his severe depression.      BP Readings from Last 3 Encounters:   01/28/20 118/64   10/30/19 138/78   12/10/18 122/60    Wt Readings from Last 3 Encounters:   01/28/20 113.4 kg (250 lb)   10/30/19 113.9 kg (251 lb)   12/10/18 111.1 kg (245 lb)                 Reviewed and updated as needed this visit by Provider         Review of Systems   ROS COMP: Constitutional, HEENT,  cardiovascular, pulmonary, gi and gu systems are negative, except as otherwise noted.      Objective    /64   Pulse 94   Temp 97.4  F (36.3  C) (Tympanic)   Resp 20   Ht 1.829 m (6')   Wt 113.4 kg (250 lb)   SpO2 97%   BMI 33.91 kg/m    Body mass index is 33.91 kg/m .  Physical Exam   GENERAL: healthy, alert and no distress  PSYCH: mentation appears normal, affect flat and judgement and insight intact    Diagnostic Test Results:  Labs reviewed in Epic        Assessment & Plan     Daniel was seen today for recheck medication and sleep problem.    Diagnoses and all orders for this visit:    Moderate to severe episode of recurrent major depressive disorder (H): worsening  Patient contracts for safety, will plan to talk to mom or listen to music or distract self if thoughts of harming self  -plan to switch off cymbalta and on to prozac  Discussed wellbutrin option as well, don't want to start adderall right now, but could in the future  -discussed risks, benefits, and side effects of this new medication. Patient understands and is in agreement.  -plan to find therapy again in [place] options given  If worsening then plan [facility] needs assessment.  -     FLUoxetine (PROZAC) 10 MG capsule; Take 1 capsule (10 mg) by mouth daily for 6 days, THEN 2 capsules (20 mg) daily.    Obsessive-compulsive disorder, unspecified type: not well controlled  -plan prozac  -     FLUoxetine (PROZAC) 10 MG capsule; Take 1 capsule (10 mg) by mouth daily for 6 days, THEN 2 capsules (20 mg) daily.    Anxiety: worsening  Plan prozac  -     FLUoxetine (PROZAC) 10 MG capsule; Take 1 capsule (10 mg) by mouth daily for 6 days, THEN 2 capsules (20 mg) daily.         BMI:   Estimated body mass index is 33.91 kg/m  as calculated from the following:    Height as of this encounter: 1.829 m (6').    Weight as of this encounter: 113.4 kg (250 lb).   Weight management plan: Discussed healthy diet and exercise guidelines        Patient  Instructions   Week 1  Cymbalta decrease to one 30mg pill daily  Start Prozac 10mg daily (morning medication as it is energy giving)    Week 2 and on  Stop cymbalta  Increase prozac to 20mg (2 of the 10mg pills at once).    I would recommend to start therapy again.  Either [email or web address] or [facility]  looking for cognitive behavioral therapy.    Recheck in clinic in 4 weeks.    [facility] offers Free Needs Assessments to anybody seeking psychiatric services.  A Needs Assessment is a face-to-face interview with a trained counselor to help determine which level of care is most appropriate for that person. Anybody can schedule a Needs Assessment at one of our six locations. [facility] will schedule needs assessment appointments at the first opening available.  A Needs Assessment is not a full psychiatric evaluation, but rather a comprehensive assessment which determines the most appropriate mental health services to address an individual s needs. This may include, but is not limited to: outpatient clinic services, intensive outpatient programs, [facility] hospital, inpatient hospital, residential treatment, or substance abuse treatment.  The following are the steps a parent or patient can take to setup a Needs Assessment:  1) Call us at [phone] and ask to schedule a Needs Assessment. We offer Needs Assessments in [facility], [place], [place], and [place].  2) When a Needs Assessment is scheduled it is often helpful to bring the following documents with you to the appointment:  Parent/guardian consent (if minor)   Custody paperwork/divorce decree (if applicable)   Contact information of past and current providers (we may ask to sign a release of information to communicate with these providers)   Any past pertinent clinical records   Insurance card (you are ultimately responsible for understanding your coverage information)   The following information does not need to  be printed beforehand, but is available for your review:  Patient Bill of Rights  Needs Assessment Consent and Authorization  Confidentiality Notice  Joint Notice of Privacy Practices & Acknowledgement of Privacy Practices Receipt  3) A typical Needs Assessment interview will last between 45-60 minutes. After the interview the Needs Assessment Counselor will review the information with the Psychiatrist to determine appropriate recommendations. The entire process may take up to 90 minutes. At the conclusion of this time the family will be offered referrals, either to [facility] programs, or to community resources.  When appropriate for a [facility] Program we will schedule an intake appointment with program staff. Please note that often times it is necessary to begin treatment as soon as possible, in particular if suicidal ideation or intentions are present.   When appropriate for a clinic service at [facility], our staff schedulers will help you find an appointment at a convenient site.   Often times special conditions may be treated in a program or agency other than [facility]. We collaborate with other community providers to make sure that patients are connected with the best specialists for their condition.  If you need information about bed availability or to schedule a Free Needs Assessment please call [phone].        No follow-ups on file.    Nadeem Hernández MD  [facility]

## 2020-01-28 NOTE — PATIENT INSTRUCTIONS
Week 1  Cymbalta decrease to one 30mg pill daily  Start Prozac 10mg daily (morning medication as it is energy giving)    Week 2 and on  Stop cymbalta  Increase prozac to 20mg (2 of the 10mg pills at once).    I would recommend to start therapy again.  Either [facility] or [facility]  looking for cognitive behavioral therapy.    Recheck in clinic in 4 weeks.    [facility] offers Free Needs Assessments to anybody seeking psychiatric services.  A Needs Assessment is a face-to-face interview with a trained counselor to help determine which level of care is most appropriate for that person. Anybody can schedule a Needs Assessment at one of our six locations. [facility] will schedule needs assessment appointments at the first opening available.  A Needs Assessment is not a full psychiatric evaluation, but rather a comprehensive assessment which determines the most appropriate mental health services to address an individual s needs. This may include, but is not limited to: outpatient clinic services, intensive outpatient programs, [facility] hospital, inpatient hospital, residential treatment, or substance abuse treatment.  The following are the steps a parent or patient can take to setup a Needs Assessment:  1) Call us at [phone] and ask to schedule a Needs Assessment. We offer Needs Assessments in [facility], [place], [place], and [place].  2) When a Needs Assessment is scheduled it is often helpful to bring the following documents with you to the appointment:  Parent/guardian consent (if minor)   Custody paperwork/divorce decree (if applicable)   Contact information of past and current providers (we may ask to sign a release of information to communicate with these providers)   Any past pertinent clinical records   Insurance card (you are ultimately responsible for understanding your coverage information)   The following information does not need to be printed  beforehand, but is available for your review:  Patient Bill of Rights  Needs Assessment Consent and Authorization  Confidentiality Notice  Joint Notice of Privacy Practices & Acknowledgement of Privacy Practices Receipt  3) A typical Needs Assessment interview will last between 45-60 minutes. After the interview the Needs Assessment Counselor will review the information with the Psychiatrist to determine appropriate recommendations. The entire process may take up to 90 minutes. At the conclusion of this time the family will be offered referrals, either to [facility] programs, or to community resources.  When appropriate for a [facility] Program we will schedule an intake appointment with program staff. Please note that often times it is necessary to begin treatment as soon as possible, in particular if suicidal ideation or intentions are present.   When appropriate for a clinic service at [facility], our staff schedulers will help you find an appointment at a convenient site.   Often times special conditions may be treated in a program or agency other than [facility]. We collaborate with other community providers to make sure that patients are connected with the best specialists for their condition.  If you need information about bed availability or to schedule a Free Needs Assessment please call [phone].

## 2020-01-29 ASSESSMENT — ANXIETY QUESTIONNAIRES: GAD7 TOTAL SCORE: 18

## 2020-02-06 ENCOUNTER — TELEPHONE (OUTPATIENT)
Dept: BEHAVIORAL HEALTH | Facility: CLINIC | Age: 25
End: 2020-02-06

## 2020-02-11 ENCOUNTER — HOSPITAL ENCOUNTER (OUTPATIENT)
Dept: BEHAVIORAL HEALTH | Facility: CLINIC | Age: 25
Discharge: HOME | End: 2020-02-11
Attending: PSYCHIATRY & NEUROLOGY | Admitting: PSYCHIATRY & NEUROLOGY
Payer: COMMERCIAL

## 2020-02-11 PROCEDURE — 90791 PSYCH DIAGNOSTIC EVALUATION: CPT | Performed by: SOCIAL WORKER

## 2020-02-11 ASSESSMENT — COLUMBIA-SUICIDE SEVERITY RATING SCALE - C-SSRS
5. HAVE YOU STARTED TO WORK OUT OR WORKED OUT THE DETAILS OF HOW TO KILL YOURSELF? DO YOU INTEND TO CARRY OUT THIS PLAN?: NO
REASONS FOR IDEATION LIFETIME: MOSTLY TO END OR STOP THE PAIN (YOU COULDN'T GO ON LIVING WITH THE PAIN OR HOW YOU WERE FEELING)
TOTAL  NUMBER OF INTERRUPTED ATTEMPTS PAST 3 MONTHS: NO
TOTAL  NUMBER OF INTERRUPTED ATTEMPTS LIFETIME: NO
TOTAL  NUMBER OF ABORTED OR SELF INTERRUPTED ATTEMPTS PAST 3 MONTHS: NO
1. IN THE PAST MONTH, HAVE YOU WISHED YOU WERE DEAD OR WISHED YOU COULD GO TO SLEEP AND NOT WAKE UP?: YES
3. HAVE YOU BEEN THINKING ABOUT HOW YOU MIGHT KILL YOURSELF?: YES
6. HAVE YOU EVER DONE ANYTHING, STARTED TO DO ANYTHING, OR PREPARED TO DO ANYTHING TO END YOUR LIFE?: NO
6. HAVE YOU EVER DONE ANYTHING, STARTED TO DO ANYTHING, OR PREPARED TO DO ANYTHING TO END YOUR LIFE?: NO
ATTEMPT LIFETIME: NO
1. IN THE PAST MONTH, HAVE YOU WISHED YOU WERE DEAD OR WISHED YOU COULD GO TO SLEEP AND NOT WAKE UP?: YES
2. HAVE YOU ACTUALLY HAD ANY THOUGHTS OF KILLING YOURSELF LIFETIME?: YES
REASONS FOR IDEATION PAST MONTH: MOSTLY TO END OR STOP THE PAIN (YOU COULDN'T GO ON LIVING WITH THE PAIN OR HOW YOU WERE FEELING)
TOTAL  NUMBER OF ABORTED OR SELF INTERRUPTED ATTEMPTS PAST LIFETIME: YES
5. HAVE YOU STARTED TO WORK OUT OR WORKED OUT THE DETAILS OF HOW TO KILL YOURSELF? DO YOU INTEND TO CARRY OUT THIS PLAN?: YES
2. HAVE YOU ACTUALLY HAD ANY THOUGHTS OF KILLING YOURSELF?: YES
ATTEMPT PAST THREE MONTHS: NO

## 2020-02-11 ASSESSMENT — PAIN SCALES - GENERAL: PAINLEVEL: NO PAIN (0)

## 2020-02-11 NOTE — ADDENDUM NOTE
Encounter addended by: Yessica Garcia LICSW on: 2/11/2020 2:08 PM   Actions taken: Charge Capture section accepted

## 2020-02-11 NOTE — TELEPHONE ENCOUNTER
----- Message from Antony Jones sent at 2/11/2020 12:55 PM CST -----  Regarding: [facility] [place] [facility]  Client will be starting in [facility] on Monday, 2/17/2020, under Dr. Brown. Auth needed.    Antony Jones on 2/11/2020 at 12:56 PM

## 2020-02-11 NOTE — PROGRESS NOTES
"Adult Mental Health Day Treatment    PATIENT'S NAME: Daniel Thomas  PREFERRED NAME: Daniel \" Bony\"  PREFERRED PRONOUNS: He/Him/His/Himself  MRN:   [id number]  :   1995  ACCT. NUMBER: [id number]  DATE OF SERVICE: 20  START TIME: 8:54  END TIME: 11:00AM  PREFERRED PHONE: [phone]  May we leave a program related message: Yes    STANDARD DIAGNOSTIC ASSESSMENT    VIDEO VISIT: No    Identifying Information:  Patient is a 24 year old, .  The pronoun use throughout this assessment reflects the sex of the patient at birth.  Patient was referred for an assessment by primary care provider.  Patient attended the session alone.     The patient describes their cultural background as  no Mormonism id.  Cultural influences and impact on patient's life structure, values, norms, and healthcare: NA.  The patient reports there are no ethnic, cultural or Orthodox factors that may be relevant for therapy.  Patient identified his preferred language to be English. Patient reported he does not need the assistance of an  or other support involved in therapy. Modifications will not be used to assist communication in therapy.   Patient reports he is able to understand written materials.    Chief Complaint:   The reason for seeking services at this time is: \" Lately I feel like life is not worth living anymore\". He reported his sleep schedule is very screwed up. Low energy and low motivation and struggling to work and hold his job.      History of Presenting Concern:  The problem(s) began  In the last 3 months he reported \" symptoms of OCD and depression and anxiety and feeing hopeless are non-stop\".   Patient has attempted to resolve these concerns in the past through individual therapy and psychiatry. But feels individual therapy has not been helpful..   Patient reports that other professional(s) are currently involved in providing support / services.  PCP    Social/Family History:  Patient " "reported he grew up in [place], [place].    They were raised by biological parents.  Up till the age of 7, they . Client lived primarily with mother as father never held a job and had multiple marriages.   Dad met someone else and remarried and it was his wife's child that client witness not washing hands. Does not see his dad. They shared custody.   They were the first born of 3 children.   Patient reported that his childhood was \" bad\".   Patient described his current relationships with family of origin as: does not talk to dad. Reported good with mom and siblings. .      Patient's highest education level was some college   . Patient did identify the following learning problems: attention and concentration. Had an IEP in school.     Patient reported the following relationship history : single.    Patient's current relationship status is single for .     Patient identified their sexual orientation as i dont know. \" I don't know\".    Patient reported having 0 children.     Patient's current living/housing situation involves renting a property. Living in an apartment in [place] and lives alone.    Patient identifie the following as part of their support system. Mom. Tends to keep emotions to self.    Patient identified the quality of these relationships as fair.      Patient is currently employed full time and reports they are not able to function appropriately at work..    Patient did not serve in the .    Patient reports their finances are obtained through employment.    Patient does identify finances as a current stressor.  Has not been able to save money and is living paycheck to paycheck.    Patient reported that he has been involved with the legal system. Possession of drug paraphenalia. Given a citation and this was after HS.     Patient denies being on probation / parole / under the jurisdiction of the court.    Medical Issues:  Patient reports family history includes Arthritis in his " "father; Breast Cancer in his maternal grandmother; Cancer in his paternal grandfather; Prostate Cancer in his maternal grandfather; Thyroid Disease in his mother and paternal grandmother.    Patient has not had a physical exam to rule out medical causes for current symptoms.    Date of last physical exam was greater than a year ago and client was encouraged to schedule an exam with PCP.   The patient [facility] and With [facility]ac she is at [facility].    Patient reports no current medical concerns.    They did not report dental concerns.    There are significant appetite / nutritional concerns / weight changes. Has been gaining weight in  while on Adderall was \"bone skinny\" and they took him off it and he has gained weight.    The patient has not been diagnosed with an eating disorder.    The patient denies the presence of chronic or episodic pain.    Patient does report a history of head injury / trauma / cognitive impairment.  As a kid hit head on tree stump and another time in football he was knocked out for seconds but never sought medical care and no further symptoms.    Patient reports current meds as:   Outpatient Medications Marked as Taking for the 2/11/20 encounter (Hospital Encounter) with Yessica Garcia LICSW   Medication Sig     FLUoxetine (PROZAC) 10 MG capsule Take 1 capsule (10 mg) by mouth daily for 6 days, THEN 2 capsules (20 mg) daily.       Medication Adherence:  Patient reports taking prescribed medications as prescribed    Patient Allergies:  Allergies   Allergen Reactions     Seasonal Allergies      Runny nose, itchy eyes       Medical History:  Past Medical History:   Diagnosis Date     Depressive disorder        Mental Health History:  Patient did not report a family history of mental health concerns; see medical history section for details.    Patient previously received the following mental health diagnosis: " ADHD, Anxiety, Obsessive compulsive disorder and Autism.  Patient reported symptoms began In 3rd grade.  Someone at the school diagnosed. Was diagnosed with autism spectrum, ADHD, OCD did not kick in till later. Reported he was diagnosed at [facility] with Autism mild 3 years ago.      Patient has received the following mental health services in the past: therapy with Mayda and Radha; Barry Valles, psychiatry. .  Hospitalizations: None.    Patient denies a history of civil commitment.  Patient is currently receiving the following services: none.        Current Mental Status Exam:   Appearance:  Appropriate   Eye Contact:  Fair   Psychomotor:  Normal       Gait / station:  no problem  Attitude / Demeanor: Cooperative   Speech      Rate / Production: Normal       Volume:  Normal  volume      Language:  Rate/Production: Normal    Mood:   Anxious  Depressed   Affect:   Blunted   Thought Content: Clear   Thought Process: Coherent  Logical       Associations: Volume: Normal    Insight:   Poor   Judgment:  Intact   Orientation:  All  Attention/concentration: Limited to fair      Review of Symptoms:  Depression: No symptoms, Change in sleep, Lack of interest, Change in energy level, Difficulties concentrating, Suicidal ideation, Feelings of hopelessness, Low self-worth, Feeling sad, down, or depressed, Withdrawn, Frequent crying and Self-injurious behavior  Lisa:  No Symptoms  Psychosis: No Symptoms  Anxiety: Excessive worry, Nervousness, Fears/phobias fears germs and fears others entering his home, , Sleep disturbance, Poor concentration and Irritability  Panic:  Palpitations and Shortness of breath  Post Traumatic Stress Disorder: No Symptoms  Eating Disorder: No Symptoms  Oppositional Defiant Disorder:  No Symptoms  ADD / ADHD:  Inattentive, Difficulties listening, Poor organizational skills and Distractibility  Conduct Disorder: No symptoms  Autism Spectrum Disorder: Deficits in social communication and  social interactions, Deficits in developing, maintaining, and understanding relationships, Deficits in social-emotional reciprocity and Deficits in non-verbal communication behaviors used for social interaction  Obsessive Compulsive Disorder: Checking, Cleaning and Washing  Other Compulsive Behaviors: has not   Substance Use: blackouts, vomiting, hangovers, driving under the influence, riding with someone under the influence and cravings/urges to use    Rating Scales:  PHQ9     PHQ-9 SCORE 12/10/2018 10/30/2019 1/28/2020   PHQ-9 Total Score - - -   PHQ-9 Total Score 10 15 17     GAD7     BILLY-7 SCORE 12/10/2018 10/30/2019 1/28/2020   Total Score 8 13 18     CGI   Clinical Global Impressions  Initial result:  Considering your total clinical experience with this particular patient population, how severe are the patient's symptoms at this time?: 5 (02/11/20 0853)  Compared to the patient's condition at the START of treatment, this patient's condition is:: 5 (02/11/20 0853)  Most recent result:  Considering your total clinical experience with this particular patient population, how severe are the patient's symptoms at this time?: 5 (02/11/20 0853)  Compared to the patient's condition at the START of treatment, this patient's condition is:: 5 (02/11/20 0853)    Substance Use History:  Patient did report a family history of substance use concerns; see medical history section for details.  Patient has not received chemical dependency treatment in the past.    Patient has not ever been to detox.      Patient is not currently receiving any chemical dependency treatment. Patient reported the following problems as a result of their substance use: None.     Patient reports using alcohol Age of first use, when he turned 23. Reported 1x a week would drink a 2 liter bottle of vodka in the span of 2 days. He did this for a couple of months, then decreased and started drinking beer. Would drink 100 bottles of beer within 2 to 3  "weeks. This was going on until recently ( the last 4 months) he now reported he drinks 4 or 5 bottles of beer 1x a week.    Patient REPORTS TOBACCO.Vapes tobacco using daily.  Patient REPORTS MARIJUANA:Ever since got job at [employer] stopped using. Reported urges. Age of first use- after HS. Would use it 5 days a week, not sure how much he used, less than gram a day. Has been abstinent for last 1 1/2 years.  Patient REPORTS CAFFEINE: Occasionally.  Patient denies cocaine/crack use.  Patient denies meth/amphetamine use.  Patient denies use of heroin  Patient denies use of other opiates.  Patient denies inhalant use  Patient denies use of benzodiazepines.  Patient denies use of hallucinogens.  Patient denies use of barbiturates, sedatives, or hypnotics.  Patient denies use of over the counter drugs.  Patient denies use of other substances.      Patient is not concerned about substance use.    CAGE-AID (CAGE Questions Adapted to Include Drugs)    1. Have you ever felt you ought to cut down on your drinking or drug use?  Yes.  2. Have people annoyed you by criticizing your drinking or drug use?  Yes.  3. Have you felt bad or guilty about your drinking or drug use?  No.  4. Have you ever had a drink or used drugs first thing in the morning to steady your nerves or to get rid of a hangover?  No.    Scoring: Item responses on the CAGE-AID are scored 0 for \"no\" and 1 for \"yes\" answers. A higher score is an indication of alcohol problems. A total score of 2 or greater is considered clinically significant.      Based on the positive CAGE score and clinical interview there  client identified past heavy use of alcohol but stated he is abstinent or very limited in last several months and does not identify it as primary concern. .    Significant Losses / Trauma / Abuse / Neglect Issues:     Dad unable to hold a job and \" reported he was very mean\", he has been remarried multiple times.  Grandmother  4 or 5 years ago.  Concerns " "for possible neglect are not present.     Safety Assessment:  Current Safety Concerns: Client reported he has suicidal thinking of, \" driving into woods and ending his life and not ever being found\". No specific plan. He did state that he has history of being late for work and he fears he could be fired and if he is fired he would become more suicidal.     He also reported around age 16. He loaded a shot gun with plan to end his life, but stopped self and never told anyone about it. He does not have access to guns at this time.    He had a hard time identifying protective factors other than his job. He is young, white male with diagnosis of being on Autism spectrum and lives alone. He had a history of heavy alcohol use but reported he has stopped engaging in that behavior but does still occasionally drink beer on the weekend.     He does not currently have a psychiatrist or a therapist. He is only on prozac prescribed by PCP. He reported individual therapy has not been helpful and is seeking IOP in hopes he can learn some coping skills. He is motivated - protective factor. Mother joined at end of meeting and seems supportive and concerned. He engaged in creating a safety plan and has one in his possession and denied imminent thoughts or plans of harming himself at this time.   [place] Suicide Severity Rating Scale (Lifetime/Recent)  [place] Suicide Severity Rating (Lifetime/Recent) 2/11/2020   1. Wish to be Dead (Lifetime) Yes   1. Wish to be Dead (Recent) Yes   Wish to be Dead Description (Recent) (No Data)   Comments thoughts of driving out in woods and being a missing person   2. Non-Specific Active Suicidal Thoughts (Lifetime) Yes   2. Non-Specific Active Suicidal Thoughts (Recent) Yes   3. Active Suicidal Ideation with any Methods (Not Plan) Without Intent to Act (Lifetime) Yes   Active Suicidal Ideation with any Methods (Not Plan) Description (Lifetime) (No Data)   Comments not really just ending life alone " and never being found   3. Active Sucidal Ideation with any Methods (Not Plan) Without Intent to Act (Recent) Yes   5. Active Suicidal Ideation with Specific Plan and Intent (Lifetime) Yes   5. Active Suicidal Ideation with Specific Plan and Intent (Recent) No   Most Severe Ideation Rating (Lifetime) 4   Most Severe Ideation Description (Lifetime) (No Data)   Comments had a shotgun loaded age 16   Frequency (Lifetime) 3   Duration (Lifetime) 3   Controllability (Lifetime) 3   Protective Factors  (Lifetime) 2   Reasons for Ideation (Lifetime) 4   Most Severe Ideation Rating (Past Month) 4   Most Severe Ideation Description (Past Month) (No Data)   Comments would go off somewhere alone and die and be a missing person   Frequency (Past Month) 4   Duration (Past Month) 3   Controllability (Past Month) 4   Protective Factors (Past Month) 2   Reasons for Ideation (Past Month) 4   Actual Attempt (Lifetime) No   Actual Attempt (Past 3 Months) No   Has subject engaged in non-suicidal self-injurious behavior? (Lifetime) Yes   Has subject engaged in non-suicidal self-injurious behavior? (Past 3 Months) (No Data)   Comments cut self    Interrupted Attempts (Lifetime) No   Interrupted Attempts (Past 3 Months) No   Aborted or Self-Interrupted Attempt (Lifetime) Yes   Aborted or Self Interrupted Attempt Description (Lifetime) (No Data)   Comments Age 16 had loaded a shotgun but stopped self   Aborted or Self-Interrupted Attempt (Past 3 Months) No   Preparatory Acts or Behavior (Lifetime) No   Preparatory Acts or Behavior (Past 3 Months) No     Patient denies current homicidal ideation and behaviors.  Patient denies current self-injurious ideation and behaviors.    Patient reported unsafe motor vehicle operation associated with substance use.  Patient feels he drives recklessly associated with mental health symptoms.  Patient reports the following current concerns for their personal safety: None.  Patient reports there are no  "firearms in the house.     History of Safety Concerns:  Patient denied a history of homicidal ideation.     Patient History of self-injurious ideation:} used to cut self with a knife \" a couple times\", to the point of bleeding. Around age [age]. None since. Never required sutures. Reported increase in urges in last 3 months but has not acted on them.  Patient denied a history of personal safety concerns.    Patient denied a history of assaultive behaviors.    Patient denied a history of assaultive behaviors.    Patient reported a history unsafe motor vehicle operation associated with substance use.  Patient reported a history of reckless driving associated with mental health symptoms.    Patient reports the following protective factors: dedication to family/friends, safe and stable environment, abstinence from substances, adherence with prescribed medication and structured day    See Preliminary Treatment Plan for Safety and Risk Management Plan    Patient's Strengths and Limitations:  Patient identified the following strengths or resources that will help him succeed in treatment: family support. Things that may interfere with the patient's success in treatment include: few friends and lack of social support.     Diagnostic Criteria:   - Depressed mood. Note: In children and adolescents, can be irritable mood.     - Diminished interest or pleasure in all, or almost all, activities.    - Fatigue or loss of energy.    - Diminished ability to think or concentrate, or indecisiveness.    - Recurrent thoughts of death (not just fear of dying), recurrent suicidal ideation without a specific plan, or a suicide attempt or a specific plan for committing suicide.   B) The symptoms cause clinically significant distress or impairment in social, occupational, or other important areas of functioning  C) The episode is not attributable to the physiological effects of a substance or to another medical condition  D) The occurence of " major depressive episode is not better explained by other thought / psychotic disorders  E) There has never been a manic episode or hypomanic episode  {Functional Status:  Patient's  symptoms have resulted in the following functional impairments: health maintenance, management of the household and or completion of tasks, relationship(s), self-care, social interactions and work / vocational responsibilities    DSM5 Diagnoses: (Sustained by DSM5 Criteria Listed Above)  Diagnoses: Autism Spectrum Disorder 299.00(F84.0)  Associated with a known medical or genetic condition or environmental factor- per patient  296.33 (F33.2) Major Depressive Disorder, Recurrent Episode, Severe _ and With anxious distress  300.3 (F42) Obsessive Compulsive Disorder- per patient        Psychosocial & Contextual Factors:   Housing- He lives in an apartment and it is very noisy and there is some ticking in the walls and it is irritating. Also the landlord comes in a random times. Lives alone in [place]. He fears that someone can get  Into his apartment and has to check and recheck before he leaves - he feels this is part of OCD.    Work- Boss can be inconsistent. 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 His boss has told him that he has bipolar and has mood swings. Has worked there for a 1 1/2 years. A co-worked that he has known for a 1/2 year just committed suicide. He missed the  as he did not hear about it until later.     Relationships- As a child he saw someone who did not watch their hands and this was a triggering event. And since then he has really struggled with flashbacks with that specifcic person and the flashbacks cause severe anxiety. It has been happening from 1x a week to daily for the past 12 or 13 years. He cannot get rid of the flashbacks.  Frequently has to wash his own hands up to 20+ times a day. And takes a shower 2 to 3x a day. This helps temporarily.   Nightmares also about someone not washing hands especially when he sleeps in a bed so the last couple weeks he has been sleeping in a chair.       WHODAS:   WHODAS 2.0 Total Score 2/11/2020   Total Score 26       Preliminary Treatment Plan:  Plan for Safety and Risk Management:   A safety and risk management plan has been developed including: Patient consented to co-developed safety plan.  Safety and risk management plan was completed.  Patient agreed to use safety plan should any safety concerns arise.  A copy was given to the patient.     Collaboration:  Treatment team will be advised to coordinate care with the aforementioned support professionals.    The following referral(s) will be initiated: Outpatient Mental Health Therapy Group.  Next Scheduled Appointment: 2.17.20.  A Release of Information has been obtained for the following: primary care physician and emergency contact.     Patient's identified did not identify concerns regarding gender or culture    Initial Treatment will focus on: Depressed Mood - learn new skills to manage depressive symptoms including s uicidal ideation.  Anxiety - learn strategies to decrease anxiety..     Resources/Service Plan:       services are not indicated.     Modifications to assist communication are not indicated.     Additional disability accomodations reported he does have autism will continue to assess for approprtateness of this iop     Discussed the general effects of drugs and alcohol on health and well-being. Records were reviewed at time of assessment.    Report to child / adult protection services was NA.    Information in this assessment was obtained from the medical record and provided by patient who is a limited historian.     Patient will have open access to their mental health medical record.    Yessica Garcia, [facility] Care[facility]  February 11, 2020

## 2020-02-11 NOTE — ADDENDUM NOTE
Encounter addended by: Yessica Garcia LICSW on: 2/11/2020 1:33 PM   Actions taken: Clinical Note Signed

## 2020-02-11 NOTE — PROGRESS NOTES
"Outpatient Mental Health Services - Adult    MY COPING PLAN FOR SAFETY    PATIENT'S NAME: Daniel Thomas  MRN:   [id number]  SAFETY PLAN:  Step 1: Warning signs / cues (Thoughts, images, mood, situation, behavior) that a crisis may be developing:    Thoughts: \"People would be better off without me\", \"I'm a burden\", \"I can't do this anymore\" and \"I just want this to end\"    Images: No    Thinking Processes: intrusive thoughts (bothersome, unwanted thoughts that come out of nowhere): endorsed, highly critical and negative thoughts: endorsed and paranoia: fears someone could come into apt    Mood: worsening depression, hopelessness, intense worry and agitation    Behaviors: isolating/withdrawing , can't stop crying, not taking care of my responsibilities and not sleeping enough    Situations: financial stress   Step 2: Coping strategies - Things I can do to take my mind off of my problems without contacting another person (relaxation technique, physical activity):    Distress Tolerance Strategies:  play video games    Physical Activities: NA    Focus on helpful thoughts:  There are people that care about me.  Step 3: People and social settings that provide distraction:   Name: no one  No   Step 4: Remind myself of people and things that are important to me and worth living for:  \" my mom and my sisters\".   Step 5: When I am in crisis, I can ask these people to help me use my safety plan:   Name: No one Phone:      Step 6: Making the environment safe:     NO  Step 7: Professionals or agencies I can contact during a crisis:    Suicide Prevention Lifeline: 5-319-854-OSCV (8913)    Crisis Text Line Service (available 24 hours a day, 7 days a week): Text [place] to 742409    Call  **CRISIS (996353) from a cell phone to talk to a team of professionals who can help you.  Crisis Services By [facility]: Phone Number:   [place]     [phone]   [place]    [phone]   [place]    [phone]   [place]    [phone]   [place]    " [phone]   [place] [phone]   [place]     [phone]       Call 911 or go to my nearest emergency department.     I helped develop this safety plan and agree to use it when needed.  I have been given a copy of this plan.      Client signature _________________________________________________________________  Today s date:  2/11/2020  Adapted from Safety Plan Template 2008 Minnie Marcus and Shan Kerns is reprinted with the express permission of the authors.  No portion of the Safety Plan Template may be reproduced without the express, written permission.  You can contact the authors at [email or web address] or [email or web address].

## 2020-02-12 ENCOUNTER — MYC MEDICAL ADVICE (OUTPATIENT)
Dept: FAMILY MEDICINE | Facility: CLINIC | Age: 25
End: 2020-02-12

## 2020-02-12 NOTE — LETTER
RE: Daniel Thomas  [address]   [place] [address]          To Whom It May Concern:    Daniel is an established patient of mine who requires intermittent leave to attend appointments for 12 weeks beginning the week of 2/17/2020. He requires time off M, W, Th from 11am through the remainder of the day. He is able to work 7am-11am on M,W,Th and normal full days Tues and Fridays.    Please contact me if any questions.    Thank you,        Nadeem Hernández MD

## 2020-02-12 NOTE — TELEPHONE ENCOUNTER
Dr. Hernández,    Patient sends my chart needing a letter on our letterhead for his FMLA.  Please see message. Nicki HUGHES RN

## 2020-02-17 ENCOUNTER — BEH TREATMENT PLAN (OUTPATIENT)
Dept: BEHAVIORAL HEALTH | Facility: CLINIC | Age: 25
End: 2020-02-17
Attending: PSYCHIATRY & NEUROLOGY

## 2020-02-17 ENCOUNTER — HOSPITAL ENCOUNTER (OUTPATIENT)
Dept: BEHAVIORAL HEALTH | Facility: CLINIC | Age: 25
End: 2020-02-17
Attending: PSYCHIATRY & NEUROLOGY
Payer: COMMERCIAL

## 2020-02-17 VITALS — BODY MASS INDEX: 34.95 KG/M2 | WEIGHT: 258 LBS | HEIGHT: 72 IN

## 2020-02-17 DIAGNOSIS — F33.2 MAJOR DEPRESSIVE DISORDER, RECURRENT EPISODE, SEVERE WITH ANXIOUS DISTRESS (H): ICD-10-CM

## 2020-02-17 PROCEDURE — G0177 OPPS/PHP; TRAIN & EDUC SERV: HCPCS

## 2020-02-17 PROCEDURE — 90853 GROUP PSYCHOTHERAPY: CPT | Performed by: COUNSELOR

## 2020-02-17 PROCEDURE — 90853 GROUP PSYCHOTHERAPY: CPT

## 2020-02-17 ASSESSMENT — ANXIETY QUESTIONNAIRES
2. NOT BEING ABLE TO STOP OR CONTROL WORRYING: MORE THAN HALF THE DAYS
3. WORRYING TOO MUCH ABOUT DIFFERENT THINGS: MORE THAN HALF THE DAYS
6. BECOMING EASILY ANNOYED OR IRRITABLE: MORE THAN HALF THE DAYS
5. BEING SO RESTLESS THAT IT IS HARD TO SIT STILL: NEARLY EVERY DAY
IF YOU CHECKED OFF ANY PROBLEMS ON THIS QUESTIONNAIRE, HOW DIFFICULT HAVE THESE PROBLEMS MADE IT FOR YOU TO DO YOUR WORK, TAKE CARE OF THINGS AT HOME, OR GET ALONG WITH OTHER PEOPLE: VERY DIFFICULT
1. FEELING NERVOUS, ANXIOUS, OR ON EDGE: NEARLY EVERY DAY
GAD7 TOTAL SCORE: 15
7. FEELING AFRAID AS IF SOMETHING AWFUL MIGHT HAPPEN: SEVERAL DAYS

## 2020-02-17 ASSESSMENT — PATIENT HEALTH QUESTIONNAIRE - PHQ9
5. POOR APPETITE OR OVEREATING: MORE THAN HALF THE DAYS
SUM OF ALL RESPONSES TO PHQ QUESTIONS 1-9: 21

## 2020-02-17 ASSESSMENT — MIFFLIN-ST. JEOR: SCORE: 2208.28

## 2020-02-17 NOTE — GROUP NOTE
"Process Group Note    PATIENT'S NAME: Daniel Thomas  MRN:   [id number]  :   1995  ACCT. NUMBER: [id number]  DATE OF SERVICE: 20  START TIME:  1:00 PM  END TIME:  1:50 PM  FACILITATOR: Darlin Kemp  TOPIC:  Process Group    Diagnoses:  Diagnoses:      Autism Spectrum Disorder 299.00(F84.0)  Associated with a known medical or genetic condition or environmental factor- per patient  296.33 (F33.2) Major Depressive Disorder, Recurrent Episode, Severe _ and With anxious distress  300.3 (F42) Obsessive Compulsive Disorder- per patient           Adult Mental Health Day Treatment  TRACK: 3B    NUMBER OF PARTICIPANTS:7          Data:    Session content: At the start of this group, patients were invited to check in by identifying themselves, describing their current emotional status, and identifying issues to address in this group.   Area(s) of treatment focus addressed in this session included Symptom Management and Personal Safety.    Patient arrived to group 15 minutes late. Patient appeared nervous, and declined check in. Patient is asked to provided feedback and appeared nervous but responded to peer with feedback regarding things can better. Patient indicated having struggles that interferr with employment and eventually finding a better. The group discussion addressed safety planning, symptoms management, emotions management and coping ahead.  Therapeutic Interventions/Treatment Strategies:  Psychotherapist offered support, feedback and validation and reinforced use of skills. Interventions include Cognitive Restructuring:  Explored impact of ineffective thoughts / distortions on mood and activity, Coping Skills: Assisted patient in identifying 1-2 healthy distraction skills to reduce overall distress and Discussed how the use of intentional \"in the moment\" actions can help reduce distress and Symptoms Management: Promoted understanding of their diagnoses and how it impacts their " functioning.    Assessment:    Patient response:   Patient responded to session by giving feedback, listening, being attentive and verbalizing understanding    Possible barriers to participation / learning include: and no barriers identified    Health Issues:   None reported       Substance Use Review:   Substance Use: No active concerns identified.    Mental Status/Behavioral Observations  Appearance:   Appropriate   Eye Contact:   Poor  Psychomotor Behavior: Retarded (Slowed)   Attitude:   Cooperative   Orientation:   All  Speech   Rate / Production: Normal    Volume:  Normal   Mood:    Anxious   Affect:    Blunted  Flat  Worrisome   Thought Content:   Safety denies any current safety concerns including suicidal ideation, self-harm, and homicidal ideation  Thought Form:  Coherent  Logical     Insight:    Fair     Plan:     Safety Plan: No current safety concerns identified.  Recommended that patient call 911 or go to the local ED should there be a change in any of these risk factors.     Barriers to treatment: None identified    Patient Contracts (see media tab):  None    Substance Use: Not addressed in session     Continue or Discharge: Patient will continue in Adult Day Treatment (ADT)  as planned. Patient is likely to benefit from learning and using skills as they work toward the goals identified in their treatment plan.      Darlin Kemp  February 18, 2020

## 2020-02-17 NOTE — PROGRESS NOTES
"Yessica Garcia, [facility]      Mental Health   Progress Notes   Signed   Date of Service:  2/11/2020 10:14 AM   Creation Time:  2/11/2020 10:14 AM       Related encounter: Evaluation from 2/11/2020 in [facility]              []Teresa copied text    []Caleb for details  Outpatient Mental Health Services - Adult     MY COPING PLAN FOR SAFETY     PATIENT'S NAME:           Daniel Thomas  MRN:                                  [id number]  SAFETY PLAN:  Step 1: Warning signs / cues (Thoughts, images, mood, situation, behavior) that a crisis may be developing:  ? Thoughts: \"People would be better off without me\", \"I'm a burden\", \"I can't do this anymore\" and \"I just want this to end\"  ? Images: No  ? Thinking Processes: intrusive thoughts (bothersome, unwanted thoughts that come out of nowhere): endorsed, highly critical and negative thoughts: endorsed and paranoia: fears someone could come into apt  ? Mood: worsening depression, hopelessness, intense worry and agitation  ? Behaviors: isolating/withdrawing , can't stop crying, not taking care of my responsibilities and not sleeping enough  ? Situations: financial stress   Step 2: Coping strategies - Things I can do to take my mind off of my problems without contacting another person (relaxation technique, physical activity):  ? Distress Tolerance Strategies:  play video games  ? Physical Activities: NA  ? Focus on helpful thoughts:  There are people that care about me.  Step 3: People and social settings that provide distraction:                 Name: no one                   No            Step 4: Remind myself of people and things that are important to me and worth living for:  \" my mom and my sisters\".   Step 5: When I am in crisis, I can ask these people to help me use my safety plan:                 Name: No one    Phone:                    Step 6: Making the environment safe:   ? NO  Step 7: Professionals or agencies I can " contact during a crisis:  ? Suicide Prevention Lifeline: 5-387-543-TALK (4450)  ? Crisis Text Line Service (available 24 hours a day, 7 days a week): Text [place] to 688385  ? Call  **CRISIS (039108) from a cell phone to talk to a team of professionals who can help you.       Crisis Services By [place]: Phone Number:   [place]     [phone]   [place]    [phone]   [place]    [phone]   [place]    [phone]   [place]    [phone]   [place] [phone]   [place]     [phone]      ? Call 911 or go to my nearest emergency department.              I helped develop this safety plan and agree to use it when needed.  I have been given a copy of this plan.       Client signature _________________________________________________________________  Today s date:  2/11/2020  Adapted from Safety Plan Template 2008 Minnie Marcus and Shan Kerns is reprinted with the express permission of the authors.  No portion of the Safety Plan Template may be reproduced without the express, written permission.  You can contact the authors at [email or web address] or [email or web address].

## 2020-02-17 NOTE — GROUP NOTE
EBP Group Note    PATIENT'S NAME: Daniel Thomas  MRN:   [id number]  :   1995  ACCT. NUMBER: [id number]  DATE OF SERVICE: 20  START TIME:  3:00 PM  END TIME:  3:50 PM  FACILITATOR: Chel Viramontes LPCC; Darlin Kemp  TOPIC:  EBP Group: Coping Skills  Adult Mental Health Day Treatment  TRACK: 3B    NUMBER OF PARTICIPANTS: 6    Summary of Group / Topics Discussed:  Coping Skills: Radical Acceptance: Patients learned radical acceptance as a way to tolerate heightened stress in the moment.  Patients identified situations that necessitate radical acceptance.  They focused on applying acceptance of the moment to tolerate difficult emotions and events. Patients discussed how to distinguish when this can be useful in their lives and when other skills are more relevant or helpful.    Patient Session Goals / Objectives:    Understand that some amount of pain exists for each of us in our lives    Process the difficulty of acceptance in our lives and benefits of radical acceptance to mood and functioning.    Demonstrate understanding of when to use the radical acceptance to tolerate distress by providing examples of situations where this could be applied.    Identify barriers to applying radical acceptance to difficult situations and explore strategies to overcome them        Patient Participation / Response:  Minimally participated, only when prompted / asked.    Demonstrated understanding of topics discussed through group discussion and participation and Demonstrated knowledge of when to consider using a variety of coping skills in daily life    Treatment Plan:  Patient has an initial individualized treatment plan that was created as part of their diagnostic assessment / admission process.  A master individualized treatment plan is in the process of being developed with the patient and multi-disciplinary care team.    CLARISSA Henry

## 2020-02-17 NOTE — GROUP NOTE
RN Group Note    PATIENT'S NAME: Daniel Thomas  MRN:   [id number]  :   1995  ACCT. NUMBER: [id number]  DATE OF SERVICE: 20  START TIME:  2:00 PM  END TIME:  2:50 PM  FACILITATOR: Vivien Bueno RN  TOPIC:  RN Group: Medication Education and Management  Adult Mental Health Day Treatment  TRACK: 3B    NUMBER OF PARTICIPANTS: 6    Summary of Group / Topics Discussed:  Medication Educations and Management:  Medication Jeopardy: Patients provided education regarding medication safety, antidepressants, side effects, neuroleptics, expected medication outcomes, knowledge of diagnosis, symptoms, and symptom management through an engaging jeopardy-style format.     Patient Session Goals / Objectives:    ? Participated in team-based [employer] game  ? Identified strategies for safe use, handling, and disposal of medications  ? Discussed basic aspects of medication safety, side effects, adverse outcomes and contraindications        Patient Participation / Response:  Moderately participated, sharing some personal reflections / insights and adequately adequately received / provided feedback with other participants.     Identified / Expressed personal readiness to practice skills and Verbalized understanding of medication education and management topic    Treatment Plan:  Patient has an initial individualized treatment plan that was created as part of their diagnostic assessment / admission process.  A master individualized treatment plan is in the process of being developed with the patient and multi-disciplinary care team.    Vivien Bueno RN

## 2020-02-17 NOTE — TELEPHONE ENCOUNTER
Hard copy of Rx to clinic  - Patient notified. Rescheduled patient appointment with Dr. Hernández to 2/25/2020. Billie Joyner on 2/17/2020 at 9:28 AM
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
## 2020-02-18 ASSESSMENT — ANXIETY QUESTIONNAIRES: GAD7 TOTAL SCORE: 15

## 2020-02-18 NOTE — PROGRESS NOTES
Acknowledgement of Current Treatment Plan       I have reviewed my treatment plan with my therapist on 2/24/20.   I agree with the plan as it is written in the electronic health record. (3B)    Name:      Signature:  Daniel Brown MD  Psychiatrist    Sridevi Cortes, NP    Darlin Kemp, LMFT, [facility]

## 2020-02-18 NOTE — PROGRESS NOTES
Adult Day Treatment Program:  Individualized Treatment Plan     Date of Plan: 20    Name: Daniel Thomas MRN: [id number]    : 1995    Programs:  Adult Day Treatment (ADT)     Clinical Track (if applicable):  3B    DSM5 Diagnosis  Autism Spectrum Disorder 299.00(F84.0)  Associated with a known medical or genetic condition or environmental factor- per patient  296.33 (F33.2) Major Depressive Disorder, Recurrent Episode, Severe _ and With anxious distress  300.3 (F42) Obsessive Compulsive Disorder- per patient         Adult Day Treatment Program Multidisciplinary Team Members: Wallace Brown MD and/or Sridevi Cortes NP; Reba Irby [facility],  Dale Whitley, MOTR/L, Carlos Galloway OTR/L, Vivien Bueno RN, BSN    Daniel Thomas will participate in the Adult Day Treatment Program  3 days per week, 3 hours per day. Anticipated duration/discharge: 12 weeks    Due to COVID-19, the type of service modality, frequency, and duration of treatment is altered. Services will be delivered via telemedicine and/or telephone calls until able to resume in-person group modality.        Program Start Date: 20   Discharge Date: 20    NOTE: Complete CGI     Review Date: Does Daniel Thomas continue to meet criteria to participate in the Adult Day Treatment Program, 3 days per week; 3 hours per day?   20 yes   20 Daniel feels ready to discharge from the program.                   Client Strengths:   family support, job, physically active, motivated    Client Participation in Plan:  Contributed to goals and plan     Areas of Vulnerability:  Suicidal Ideation   Anxiety  Depressive symptoms   OCD symptoms (checking,cleaning and washing)     Long-Term Goals:  Knowledge about illness and management of symptoms   Maintenance of personal safety     Abuse Prevention Plan:  Safe, therapeutic environment   Safety coping plan as needed   Education regarding illness and skill development   Coordination  with care providers     Discharge Criteria:  Satisfactory progress toward treatment goals   Improvement re: identified problems and symptoms   Ability to continue recovery at next level of service   Has a discharge plan in place   Has safety/coping plan in place      Areas of Treatment Focus        Area of Treatment Focus:   Personal Safety  Start Date:    2/24/20    Goal:  Target Date: 4/20/20,6/15 Status: completed   Yossi)will notify staff when needing assistance to develop or implement a coping plan to manage suicidal or self injurious urges.Use coping plan for safety, as needed.      Progress:   Patient has not reported any issues with his personal safety in recent weeks.        Treatment Strategies:   Assess / reassess level of potential for harm to self or others  Engage in safety planning when indicated  Facilitate increased self awareness          Area of Treatment Focus:   Symptom Stabilization and Management  Start Date:    2/24/20    Goal:  Target Date: 4/20/20,6/15 Status: completed  When in life skills group Yossi) will report progress to act opposite to his emotion to help increase motivation/energy to complete daily chores/work providing an update weekly.        Progress:   Patient has been reporting progress in his mental health recovery which is attributed to being in the program.Patient also has been experiencing increased motivation to do daily chores/work.         Treatment Strategies:   Facilitate increased self awareness  Provide education regarding coping skills and strategies to assist with mental health recovery   Establish a regular sleep schedule with the goal of getting 7-8 hours of sleep each night          Area of Treatment Focus:   Symptom Stabilization and Management  Start Date:    2/24/20    Goal:  Target Date: 4/20/20,6/15 Status: completed  In group Bony Robertson)  will learn, practice, generalize 2 sensory modulation or sensorimotor mindfulness based self-regulation  skills for improved ability to stay in the present moment and distress tolerance.      Progress:   Daniel has learned mindfulness skills        Treatment Strategies:   Facilitate increased self awareness  Provide education regarding sensory modulation,mindfulness and distress tolerance skills          Area of Treatment Focus:   Wellness and Mental Health  Start Date:    2/24/20    Goal:  Target Date: 4/20/20,6/15 Status: completed  Yossi) will improve wellness related behaviors by getting enough sleep,exercise, balanced nutrition and take medications (if prescribed) to maintain good mental health.        Progress:   Daniel has made progress in this area        Treatment Strategies:   Facilitate increased self awareness  Provide education regarding wellness habits and routines which help to improve mental health           Area of Treatment Focus:   Community Resources / Support and Discharge Planning  Start Date:    2/24/20    Goal:  Target Date: 4/20/20,6/15 Status: Active  Bony (Daniel) will establish an aftercare plan to include medical providers and social supports by discharge.      Progress:  Patient has an established primary care provider Dr. Stiven [facility]. He has an appointment with Dr Nadeem Hernández , psychiatrist at [facility]. Patient saw a [facility] and is now setting up an appointment with an individual therapist: Wallace at [facility]    Patient  Has been formed about the availability of online mental health support groups such as ALISA and DBSA.    Treatment Strategies:   Facilitate increased self awareness  Provide education regarding relapse prevention,discharge planning and mental health support          Area of Treatment Focus:   Symptom Stabilization and Management  Start Date:    2/24/20    Goal:  Target Date: 4/20/20,6/15 Status: completed  Will learn and practice 1-2 coping skills to help improve mood     Daniel Will address skills to build self confidence,and self compassion, challenging negative  "thoughts/beliefs that contribute to shame    Daniel will increase self-awareness, self-validation, and authenticity. Practice stating your feelings, opinions, and preferences in group and in personal life    Progress:   Daniel has learned skills to decrease depression, build confidence. He is more able to see his strengths. He has increased exercise which reduces his anxiety.        Treatment Strategies:   Assist clients in establishing / strengthening support network  Assist to identify treatment goals  Assist with discharge planning  Assess / reassess for appropriate therapy program involvement, encourage participation in therapies  Engage in safety planning when indicated  Facilitate increased self awareness  Provide education regarding spefcilty topics  Provide feedback about social skills  Teach adaptive coping skills and communication skills  Use reality based supportive approach     Arsalan Galloway, OTR/L      NOTE: Required signatures are completed manually and scanned into the electronic medical record. See \"Media\" tab in epic.    The Individualized Treatment Plan Signature Page has been routed to the provider for co-sign.  "

## 2020-02-18 NOTE — DISCHARGE SUMMARY
Adult Mental Health Intensive Outpatient Discharge Summary/Instructions      Patient: Daniel Thomas MRN: [id number]   : 1995 Age: 24 year old Sex: male     Admission Date: 20  Discharge Date: 2020  Diagnosis: Autism Spectrum Disorder 299.00(F84.0)  Associated with a known medical or genetic condition or environmental factor- per patient  296.33 (F33.2) Major Depressive Disorder, Recurrent Episode, Severe _ and With anxious distress  300.3 (F42) Obsessive Compulsive Disorder- per patient      Focus of Treatment / Progress    Personal Safety:     * Follow your safety plan     * Call crisis lines as needed:    [facility] [phone] [facility] [phone]  [facility] [phone] Crisis Connection [phone]  [facility] [phone] [facility] [phone]  [facility] [phone] National Suicide Prevention [phone]  [facility] [phone] Suicide Prevention [phone]  [facility] [phone]    Managing symptoms of:  Mood    Community support/health:  [place] [address] ([phone]) [email or web address], [place] Crisis text line( Text [place] to 566587),  [facility]  [place], [place] ([phone])  [facility] [place], [place] ( [phone])       Managing Symptoms and Preventing Relapse    * Go to all of your appointments    * Take all medications as directed.      * Carry a current list if medication with you    * Do not use illicit (street) drugs.  Avoid alcohol    * Report these symptoms to your care team. These are early signs of relapse:   Thoughts of suicide   Losing more sleep   Increased confusion   Mood getting worse   Feeling more aggressive       *Use these skills daily:  Talk to someone you trust, accept challenges with a positive attitude, exercise,  get enough sleep, eat healthy foods, get into a good routine, keep appointments with therapist and doctors    Copy of summary sent  to: in Epic    Follow up with psychiatrist / main caregiver: Dr Nadeem Bragg [place]    Next visit: To be scheduled    Follow up with your therapist: Wallace LU   Next visit: 5/18/20    Go to group therapy and / or support groups at: [facility] and [facility] [place]([facility]) support groups    See your medical doctor about:  For an annual physical exam or any general wellness or illness as needed.    Other:  Your treatment team appreciates having the opportunity to work with you and wishes you the best.    Client Signature:_______________________  Date / Time:___________  Staff Signature:________________________   Date / Time:___________

## 2020-02-19 ENCOUNTER — HOSPITAL ENCOUNTER (OUTPATIENT)
Dept: BEHAVIORAL HEALTH | Facility: CLINIC | Age: 25
End: 2020-02-19
Attending: PSYCHIATRY & NEUROLOGY
Payer: COMMERCIAL

## 2020-02-19 PROCEDURE — G0177 OPPS/PHP; TRAIN & EDUC SERV: HCPCS

## 2020-02-19 PROCEDURE — 90853 GROUP PSYCHOTHERAPY: CPT

## 2020-02-19 NOTE — GROUP NOTE
Life Skills/OT Group Note    PATIENT'S NAME: Daniel Thomas  MRN:   [id number]  :   1995  ACCT. NUMBER: [id number]  DATE OF SERVICE: 20  START TIME:  3:00 PM  END TIME:  3:50 PM  FACILITATOR: Dale Whitley OT  TOPIC:  Life Skills Group: Sensory Approaches in Mental Health  Adult Mental Health Day Treatment  TRACK: 4B    NUMBER OF PARTICIPANTS: 7    Summary of Group / Topics Discussed:  Sensory Approaches in Mental Health:  Sensory Enhanced Mindfulness: Patients were taught and provided with an opportunity to explore and practice how using sensory enhanced mindfulness practices can help them stay grounded in the present moment as a way to manage mental health symptoms and stressors.         Patient Session Goals / Objectives:    Identified how using sensory enhanced mindfulness practices can be used for grounding, stress management, and self regulation      Improved awareness of different types of sensory enhanced mindfulness activities that assist with healthy coping of stress and symptoms      Established a plan for practice of these skills in their own environments    Practiced and reflected on how to generalize taught skills to their everyday life        Patient Participation / Response:  Moderately participated, sharing some personal reflections / insights and adequately adequately received / provided feedback with other participants.    Verbalized understanding of content and Patient would benefit from additional opportunities to practice the content to be able to generalize it to their everyday life with increased intentionality, consistency, and efficacy in support of their psychiatric recovery    Treatment Plan:  Patient has an initial individualized treatment plan that was created as part of their diagnostic assessment / admission process.  A master individualized treatment plan is in the process of being developed with the patient and multi-disciplinary care team.    Dale Whitley,  OT

## 2020-02-19 NOTE — GROUP NOTE
EBP Group Note    PATIENT'S NAME: Daniel Thomas  MRN:   [id number]  :   1995  ACCT. NUMBER: [id number]  DATE OF SERVICE: 20  START TIME:  2:00 PM  END TIME:  2:50 PM  FACILITATOR: Darlin Kemp  TOPIC:  EBP Group: Coping Skills  Adult Mental Health Day Treatment  TRACK: 3B    NUMBER OF PARTICIPANTS: 7    Summary of Group / Topics Discussed:  Coping Skills: Distraction: Patients learned to mindfully use distraction as a way to decrease heightened stress in the moment.  Patients will identified situations that necessitate healthy distraction strategies.  They explored ways to manage physical symptoms of distress using distraction. The group began to distinguish when this can be useful in their lives or when other strategies would be more relevant or helpful.    Patient Session Goals / Objectives:    Understand the purpose and benefits of using healthy distraction to decrease distress.    Process what happens in the body when using distraction strategies.    Demonstrate understanding of when to use distraction strategies.    Explore patient s current distraction activities, and how to take a more intentional approach to the use of distraction.    Identify and problem solve barriers to applying distraction strategies.    Choose 1-2 healthy distraction strategies to apply during times of distress.        Patient Participation / Response:  Minimally participated, only when prompted / asked.    Identified 2-3 positive coping strategies that have helped maintain / improve symptoms in the past and Identified / Expressed personal readiness to practice new coping skills    Treatment Plan:  Patient has an initial individualized treatment plan that was created as part of their diagnostic assessment / admission process.  A master individualized treatment plan is in the process of being developed with the patient and multi-disciplinary care team.    Darlin Kemp

## 2020-02-19 NOTE — GROUP NOTE
"Process Group Note    PATIENT'S NAME: Daniel Thomas  MRN:   [id number]  :   1995  ACCT. NUMBER: [id number]  DATE OF SERVICE: 20  START TIME:  1:00 PM  END TIME:  1:50 PM  FACILITATOR: Darlin Kemp  TOPIC:  Process Group    Diagnoses:  Diagnoses:      Autism Spectrum Disorder 299.00(F84.0)  Associated with a known medical or genetic condition or environmental factor- per patient  296.33 (F33.2) Major Depressive Disorder, Recurrent Episode, Severe _ and With anxious distress  300.3 (F42) Obsessive Compulsive Disorder- per patient           Adult Mental Health Day Treatment  TRACK: 3B    NUMBER OF PARTICIPANTS: 7          Data:    Session content: At the start of this group, patients were invited to check in by identifying themselves, describing their current emotional status, and identifying issues to address in this group.   Area(s) of treatment focus addressed in this session included Symptom Management and Personal Safety.    Patient engaged in grounding activity and endorsed feeling stressed. Patient goal is to relax, barrier is not identfied, and skills to use is prioritizing important task, time management, and taking a break. Patient is proud about making it to work today. Patient declined processign time denied suicidal thoughts. Patient provided feedback when called on. The group discussion addressed opposite behavior, boundaries, and personal strengths. The group practiced positive self affirmation at the end.    Therapeutic Interventions/Treatment Strategies:  Psychotherapist offered support, feedback and validation and reinforced use of skills. Interventions include Coping Skills: Discussed use of self-soothe skills to decrease distress in the body, Assisted patient in identifying 1-2 healthy distraction skills to reduce overall distress and Discussed how the use of intentional \"in the moment\" actions can help reduce distress, Emotions Management:  Reviewed opposite action skill and " Increased awareness of daily mood patterns/changes and Relationship Skills: Encouraged development and maintenance  of healthy boundaries.    Assessment:    Patient response:   Patient responded to session by accepting feedback, giving feedback, listening, focusing on goals, being attentive and verbalizing understanding    Possible barriers to participation / learning include: and no barriers identified    Health Issues:   None reported       Substance Use Review:   Substance Use: No active concerns identified.    Mental Status/Behavioral Observations  Appearance:   work clothes-steel toe boots   Eye Contact:   Fair   Psychomotor Behavior: Normal   Attitude:   Cooperative   Orientation:   All  Speech   Rate / Production: Normal    Volume:  Normal   Mood:    Anxious   Affect:    Worrisome   Thought Content:   Safety denies any current safety concerns including suicidal ideation, self-harm, and homicidal ideation  Thought Form:  Coherent  Logical     Insight:    Fair     Plan:     Safety Plan: No current safety concerns identified.  Recommended that patient call 911 or go to the local ED should there be a change in any of these risk factors.     Barriers to treatment: None identified    Patient Contracts (see media tab):  None    Substance Use: Not addressed in session     Continue or Discharge: Patient will continue in Adult Day Treatment (ADT)  as planned. Patient is likely to benefit from learning and using skills as they work toward the goals identified in their treatment plan.      Darlin Kemp  February 20, 2020

## 2020-02-20 ENCOUNTER — HOSPITAL ENCOUNTER (OUTPATIENT)
Dept: BEHAVIORAL HEALTH | Facility: CLINIC | Age: 25
End: 2020-02-20
Attending: PSYCHIATRY & NEUROLOGY
Payer: COMMERCIAL

## 2020-02-20 PROCEDURE — 90853 GROUP PSYCHOTHERAPY: CPT

## 2020-02-20 PROCEDURE — G0177 OPPS/PHP; TRAIN & EDUC SERV: HCPCS

## 2020-02-20 NOTE — GROUP NOTE
EBP Group Note    PATIENT'S NAME: Daniel Thomas  MRN:   [id number]  :   1995  ACCT. NUMBER: [id number]  DATE OF SERVICE: 20  START TIME:  3:00 PM  END TIME:  3:50 PM  FACILITATOR: Darlin Kemp  TOPIC:  EBP Group: Coping Skills  Adult Mental Health Day Treatment  TRACK: 3B    NUMBER OF PARTICIPANTS: 7    Summary of Group / Topics Discussed:  Coping Skills: Self-Soothe: Patients learned to apply self-soothe as a way to decrease heightened stress in the moment.  Patients identified situations that necessitate self-soothe strategies.  They focused on ways to manage physical symptoms of distress using the senses. They discussed how to distinguish when this can be useful in their lives when other strategies are more relevant or helpful.    Patient Session Goals / Objectives:    Understand the purpose of using the senses to decrease distress    Process what happens in the body when using self-soothe strategies    Demonstrate understanding of when to use self-soothe strategies    Identify and problem solve barriers to applying self-soothe strategies.    Choose 1-2 self-soothe strategies to apply during times of distress.        Patient Participation / Response:  Fully participated with the group by sharing personal reflections / insights and openly received / provided feedback with other participants.    Expressed understanding of the relevance / importance of coping skills at distressing times in life, Identified 2-3 positive coping strategies that have helped maintain / improve symptoms in the past and Identified / Expressed personal readiness to practice new coping skills    Treatment Plan:  Patient has a current master individualized treatment plan.  See Epic treatment plan for more information.    Darlin Kemp

## 2020-02-20 NOTE — GROUP NOTE
"Process Group Note    PATIENT'S NAME: Daniel Thomas  MRN:   [id number]  :   1995  ACCT. NUMBER: [id number]  DATE OF SERVICE: 20  START TIME:  1:00 PM  END TIME:  1:50 PM  FACILITATOR: Darlin Kemp  TOPIC:  Process Group    Diagnoses:  Diagnoses:      Autism Spectrum Disorder 299.00(F84.0)  Associated with a known medical or genetic condition or environmental factor- per patient  296.33 (F33.2) Major Depressive Disorder, Recurrent Episode, Severe _ and With anxious distress  300.3 (F42) Obsessive Compulsive Disorder- per patient           Adult Mental Health Day Treatment  TRACK: 3B    NUMBER OF PARTICIPANTS: 7          Data:    Session content: At the start of this group, patients were invited to check in by identifying themselves, describing their current emotional status, and identifying issues to address in this group.   Area(s) of treatment focus addressed in this session included Symptom Management and Personal Safety.    Patient engaged in grounding activity and endorsed feeling anxious. Patient goal is to get adequate sleep, barrier is needing to complete task, and skills to use is time management  Patient reports getting 3-4 hours of sl;eep the last couple of days, and not sleeping in his bed due to nightmares. Patients reprints having a psychiatry appointment with next week. Patient indicates that melatonin has not been helpful and sought feedback form other regarding sleep. Patient is staying at his mother house for extra support. Patient reports working limited hours while on FMLA, trying to mange basic task. Patient reports, \"my mom is trying to help me with everything and I want to figure it out on my own.\" Patient sought feedback form peers regarding setting boundaries. The group discussion addressed sleep hygiene, resolving conflict, and hopefulness. The group practiced positive self affirmation statement at the end of group.    Therapeutic Interventions/Treatment " "Strategies:  Psychotherapist offered support, feedback and validation and reinforced use of skills. Interventions include Coping Skills: Discussed use of self-soothe skills to decrease distress in the body, Assisted patient in identifying 1-2 healthy distraction skills to reduce overall distress, Discussed how the use of intentional \"in the moment\" actions can help reduce distress and Reviewed patients current calming practices and discussed a more formal way of practicing and accessing skills, Emotions Management:  Reviewed opposite action skill and Increased awareness of daily mood patterns/changes, Other: Discussed ways to increase hopefulness and Relationship Skills: Discussed strategies to promote healthier understanding of interpersonal relationships.    Assessment:    Patient response:   Patient responded to session by accepting feedback, giving feedback, listening, focusing on goals, being attentive, accepting support and verbalizing understanding    Possible barriers to participation / learning include: and no barriers identified    Health Issues:   None reported       Substance Use Review:   Substance Use: No active concerns identified.    Mental Status/Behavioral Observations  Appearance:   Appropriate   Eye Contact:   Fair   Psychomotor Behavior: Normal   Attitude:   Cooperative   Orientation:   All  Speech   Rate / Production: Normal    Volume:  Normal   Mood:    Anxious   Affect:    Worrisome   Thought Content:   Safety denies any current safety concerns including suicidal ideation, self-harm, and homicidal ideation  Thought Form:  Coherent  Logical     Insight:    Fair     Plan:     Safety Plan: No current safety concerns identified.  Recommended that patient call 911 or go to the local ED should there be a change in any of these risk factors.     Barriers to treatment: None identified    Patient Contracts (see media tab):  None    Substance Use: Not addressed in session     Continue or Discharge: " Patient will continue in Adult Day Treatment (ADT)  as planned. Patient is likely to benefit from learning and using skills as they work toward the goals identified in their treatment plan.      Darlin Kemp  February 20, 2020

## 2020-02-20 NOTE — GROUP NOTE
Life Skills/OT Group Note    PATIENT'S NAME: Daniel Thomas  MRN:   [id number]  :   1995  ACCT. NUMBER: [id number]  DATE OF SERVICE: 20  START TIME:  2:00 PM  END TIME:  2:50 PM  FACILITATOR: Arsalan Galloway OTR/L  TOPIC:  Life Skills Group: Resiliency Development  Adult Mental Health Day Treatment  TRACK: 3B    NUMBER OF PARTICIPANTS: 8    Summary of Group / Topics Discussed:  Resiliency Development: Exercises for Stress Reduction: Patients were taught how to identify stressors, signs of stress, coping skills, and prevention strategies for overall stress management.  Patients were given the opportunity to identify both ongoing and acute mental health symptoms and how to effectively manage these symptoms by developing an effective aftercare plan.  Patients increased awareness of community based resources.    Patient Session Goals / Objectives:    Identified how using coping skills can be used for symptom and stress management       Improved awareness of individualed symptoms and stressors and how to effectively cope     Established a relapse prevention plan to practice these skills in their own environments    Practiced and reflected on how to generalize taught skills to their everyday life          Patient Participation / Response:  Fully participated with the group by sharing personal reflections / insights and openly received / provided feedback with other participants.    Patient Presentation: Calm,alert,focused with stable mood and thought process. Patient completed a pre-admission mental health recovery scale receiving a score of 11/30 placing this individual in stage 2/4 recovery. Patient also discussed issues related to poor sleep and frequently being late to his job. He acknowledged a diagnosis of ADHD. He took medication in school(Adderal) but not at this time. He also reported a diagnosis of Autism Spectrum but feels that the ADHD diagnosis seems more relevant to him. Patient  indicated that he has been formally diagnosed with ADHD and was instructed to bring this with to his doctor office to discuss treatment options.    Treatment Plan:  Patient has a current master individualized treatment plan.  See Epic treatment plan for more information.    Arsalan Galloway, OTR/L

## 2020-02-24 ENCOUNTER — HOSPITAL ENCOUNTER (OUTPATIENT)
Dept: BEHAVIORAL HEALTH | Facility: CLINIC | Age: 25
End: 2020-02-24
Attending: PSYCHIATRY & NEUROLOGY
Payer: COMMERCIAL

## 2020-02-24 PROCEDURE — G0177 OPPS/PHP; TRAIN & EDUC SERV: HCPCS

## 2020-02-24 PROCEDURE — 90853 GROUP PSYCHOTHERAPY: CPT

## 2020-02-24 NOTE — GROUP NOTE
RN Group Note    PATIENT'S NAME: Daniel Thomas  MRN:   [id number]  :   1995  ACCT. NUMBER: [id number]  DATE OF SERVICE: 20  START TIME:  2:00 PM  END TIME:  2:50 PM  FACILITATOR: Vivien Bueno RN  TOPIC: TWIN RN Group: Health Maintenance  Adult Mental Health Day Treatment  TRACK: 3B    NUMBER OF PARTICIPANTS: 5    Summary of Group / Topics Discussed:  Health Maintenance: Eight Dimensions of Wellness: The concept of holistic health through the model of eight dimensions was introduced. Group members participated in identifying behaviors and activities in each of the dimensions of wellness.  The importance of each dimension was reinforced and the concept of balance in life as it relates to wellness was explored.      Patient Session Goals / Objectives:    Verbalized understanding of balance in wellness and how it relates to their life    Identified and explained the eight dimensions of wellness    Categorized activities and wellness needs into corresponding dimensions appropriately during exercise          Patient Participation / Response:  Fully participated with the group by sharing personal reflections / insights and openly received / provided feedback with other participants.    Demonstrated understanding of topics discussed through group discussion and participation and Identified / Expressed personal readiness to practice skills    Pt also expressed concern that he might be overstepping when he tries to offer suggestions/advice. Writer said that so far, he's been respectful and allows people the space to respond. Writer also encouraged him to speak with the psychotherapist and/or express to the group what his communication style is like. He was receptive and seemed more at ease.    Treatment Plan:  Patient has a current master individualized treatment plan.  See Epic treatment plan for more information.    Vivien Bueno RN

## 2020-02-25 ENCOUNTER — OFFICE VISIT (OUTPATIENT)
Dept: FAMILY MEDICINE | Facility: CLINIC | Age: 25
End: 2020-02-25
Payer: COMMERCIAL

## 2020-02-25 VITALS
WEIGHT: 261 LBS | TEMPERATURE: 97.6 F | DIASTOLIC BLOOD PRESSURE: 62 MMHG | BODY MASS INDEX: 35.35 KG/M2 | HEIGHT: 72 IN | RESPIRATION RATE: 20 BRPM | SYSTOLIC BLOOD PRESSURE: 108 MMHG | HEART RATE: 112 BPM | OXYGEN SATURATION: 98 %

## 2020-02-25 DIAGNOSIS — F90.2 ATTENTION DEFICIT HYPERACTIVITY DISORDER (ADHD), COMBINED TYPE: Primary | ICD-10-CM

## 2020-02-25 DIAGNOSIS — F41.9 ANXIETY: ICD-10-CM

## 2020-02-25 DIAGNOSIS — F42.9 OBSESSIVE-COMPULSIVE DISORDER, UNSPECIFIED TYPE: ICD-10-CM

## 2020-02-25 DIAGNOSIS — F33.1 MODERATE EPISODE OF RECURRENT MAJOR DEPRESSIVE DISORDER (H): ICD-10-CM

## 2020-02-25 PROCEDURE — 99214 OFFICE O/P EST MOD 30 MIN: CPT | Performed by: FAMILY MEDICINE

## 2020-02-25 RX ORDER — DEXTROAMPHETAMINE SACCHARATE, AMPHETAMINE ASPARTATE, DEXTROAMPHETAMINE SULFATE AND AMPHETAMINE SULFATE 2.5; 2.5; 2.5; 2.5 MG/1; MG/1; MG/1; MG/1
10 TABLET ORAL 2 TIMES DAILY
Qty: 60 TABLET | Refills: 0 | Status: SHIPPED | OUTPATIENT
Start: 2020-03-10 | End: 2020-04-09

## 2020-02-25 RX ORDER — FLUOXETINE 40 MG/1
40 CAPSULE ORAL DAILY
Qty: 30 CAPSULE | Refills: 1 | Status: SHIPPED | OUTPATIENT
Start: 2020-02-25 | End: 2020-04-07 | Stop reason: ALTCHOICE

## 2020-02-25 ASSESSMENT — MIFFLIN-ST. JEOR: SCORE: 2221.89

## 2020-02-25 NOTE — GROUP NOTE
Process Group Note    PATIENT'S NAME: Daniel Thomas  MRN:   [id number]  :   1995  ACCT. NUMBER: [id number]  DATE OF SERVICE: 20  START TIME:  1:00 PM  END TIME:  1:50 PM  FACILITATOR: Darlin Kemp  TOPIC:  Process Group    Diagnoses:  Diagnoses:      Autism Spectrum Disorder 299.00(F84.0)  Associated with a known medical or genetic condition or environmental factor- per patient  296.33 (F33.2) Major Depressive Disorder, Recurrent Episode, Severe _ and With anxious distress  300.3 (F42) Obsessive Compulsive Disorder- per patient           Adult Mental Health Day Treatment  TRACK: 3B    NUMBER OF PARTICIPANTS: 5          Data:    Session content: At the start of this group, patients were invited to check in by identifying themselves, describing their current emotional status, and identifying issues to address in this group.   Area(s) of treatment focus addressed in this session included Symptom Management and Personal Safety.    Patient engaged in grounding activity (deep breathing) and endorsed feeling overwhelmed. Patient reports not getting adequate sleep, nightmares and preoccupied with worries is interfering with functioning. Patient has an appointments with  psychitirst tomorrow to address symptoms.     Patient is staying with parent shortly. Patient reports worry about his bill and lsong in housing. Patient appears to be castrophising and is encouraged to stay in the moment, and explore alternative that could possibly implemented, The group dissuasion addressed communicating needs, boundaries, and safety planning. Patient denied feeling suicidal and agrees to follow safety plan.      Therapeutic Interventions/Treatment Strategies:  Psychotherapist offered support, feedback and validation and reinforced use of skills. Interventions include Coping Skills: Facilitated discussion on learning and applying radical acceptance skill, Discussed use of self-soothe skills to decrease distress in the  "body, Assisted patient in identifying 1-2 healthy distraction skills to reduce overall distress and Discussed how the use of intentional \"in the moment\" actions can help reduce distress, Mindfulness: Encouraged a plan to use mindfulness skills in daily life and Emotions Management:  Discussed barriers to emotional regulation, Reviewed opposite action skill and Increased awareness of daily mood patterns/changes.    Assessment:    Patient response:   Patient responded to session by accepting feedback, giving feedback, listening, focusing on goals, being attentive, accepting support, appearing disengaged and verbalizing understanding    Possible barriers to participation / learning include: and no barriers identified    Health Issues:   None reported       Substance Use Review:   Substance Use: No active concerns identified.    Mental Status/Behavioral Observations  Appearance:   Appropriate   Eye Contact:   Fair   Psychomotor Behavior: Normal   Attitude:   Cooperative   Orientation:   All  Speech   Rate / Production: Normal    Volume:  Normal   Mood:    Anxious  Depressed   Affect:    Flat  Worrisome   Thought Content:   Safety denies any current safety concerns including suicidal ideation, self-harm, and homicidal ideation  Thought Form:  Coherent  Logical     Insight:    Fair     Plan:     Safety Plan: No current safety concerns identified.  Recommended that patient call 911 or go to the local ED should there be a change in any of these risk factors.     Barriers to treatment: None identified    Patient Contracts (see media tab):  None    Substance Use: Not addressed in session     Continue or Discharge: Patient will continue in Adult Day Treatment (ADT)  as planned. Patient is likely to benefit from learning and using skills as they work toward the goals identified in their treatment plan.           Darlin Kemp  February 25, 2020  "

## 2020-02-25 NOTE — GROUP NOTE
EBP Group Note    PATIENT'S NAME: Daniel Thomas  MRN:   [id number]  :   1995  ACCT. NUMBER: [id number]  DATE OF SERVICE: 20  START TIME:  3:00 PM  END TIME:  3:50 PM  FACILITATOR: Darlin Kemp  TOPIC:  EBP Group: Behavioral Activation  Adult Mental Health Day Treatment  TRACK: 3B    NUMBER OF PARTICIPANTS: 5    Summary of Group / Topics Discussed:  Behavioral Activation: The Change Process - Behavior Change: Patients explored the process and types of change, including but not limited to, theories of change, steps to making change, methods of changing behavior, and potential barriers.  Patients worked to identify what changes may benefit their daily lives, and work towards a plan to implement change.      Patient Session Goals / Objectives:    Demonstrate understanding of the change process.      Identify positive and negative behavioral patterns.    Make plans to track and implement changes and share experiences in group.    Identify personal barriers to change      Patient Participation / Response:  Moderately participated, sharing some personal reflections / insights and adequately adequately received / provided feedback with other participants.    Expressed understanding of the relationship between behaviors, thoughts, and feelings, Shared experiences and challenges with making behavioral changes and Identified / Expressed personal readiness to make behavioral change    Treatment Plan:  Patient has a current master individualized treatment plan.  See Epic treatment plan for more information.    Darlin Kemp

## 2020-02-25 NOTE — PROGRESS NOTES
Subjective     Daniel Thomas is a 24 year old male who presents to clinic today for the following health issues:    HPI   Depression and Anxiety Follow-Up    How are you doing with your depression since your last visit? Improved. Some improvement in motivation to get up and got alarm clock loud enough to wake hime up. Has been working.  Has been doing therapy. Patient states he thinks it is working but he feels he could use more. Patient states he is wondering about starting Adderall again. He states he has taken it in the past and has been able to focus better.     OCD symptoms getting a little worse, feeling need to clean and wash hands and checking doors more since starting prozac.    How are you doing with your anxiety since your last visit?  No change.     Are you having other symptoms that might be associated with depression or anxiety? No    Have you had a significant life event? OTHER: has been working less and going to therpy sessions.     Do you have any concerns with your use of alcohol or other drugs? No    Social History     Tobacco Use     Smoking status: Former Smoker     Packs/day: 0.00     Types: Cigarettes     Last attempt to quit: 12/10/2017     Years since quittin.2     Smokeless tobacco: Never Used     Tobacco comment: patient vapes    Substance Use Topics     Alcohol use: Yes     Comment: socially      Drug use: No     PHQ 10/30/2019 2020 2020   PHQ-9 Total Score 15 17 21   Q9: Thoughts of better off dead/self-harm past 2 weeks Not at all Nearly every day More than half the days     BILLY-7 SCORE 10/30/2019 2020 2020   Total Score 13 18 15         Suicide Assessment Five-step Evaluation and Treatment (SAFE-T)      How many servings of fruits and vegetables do you eat daily?  0-1    On average, how many sweetened beverages do you drink each day (Examples: soda, juice, sweet tea, etc.  Do NOT count diet or artificially sweetened beverages)?   0-1    How many days per  week do you exercise enough to make your heart beat faster? 4    How many minutes a day do you exercise enough to make your heart beat faster? 60 or more    How many days per week do you miss taking your medication? 0. He takes his medication every day but not always at the sane time every day.    BP Readings from Last 3 Encounters:   02/25/20 108/62   01/28/20 118/64   10/30/19 138/78    Wt Readings from Last 3 Encounters:   02/25/20 118.4 kg (261 lb)   02/17/20 117 kg (258 lb)   01/28/20 113.4 kg (250 lb)                  Reviewed and updated as needed this visit by Provider         Review of Systems   ROS COMP: Constitutional, HEENT, cardiovascular, pulmonary, gi and gu systems are negative, except as otherwise noted.      Objective    /62   Pulse 112   Temp 97.6  F (36.4  C) (Tympanic)   Resp 20   Ht 1.829 m (6')   Wt 118.4 kg (261 lb)   SpO2 98%   BMI 35.40 kg/m    Body mass index is 35.4 kg/m .  Physical Exam   GENERAL: healthy, alert and no distress  NEURO: Normal strength and tone, mentation intact and speech normal  PSYCH: mentation appears normal, affect normal/bright    Diagnostic Test Results:  Labs reviewed in Epic        Assessment & Plan     Daniel was seen today for recheck medication.    Diagnoses and all orders for this visit:    Attention deficit hyperactivity disorder (ADHD), combined type: discussed restarting adderall and potential side effects  -discussed risks, benefits, and side effects of this new medication. Patient understands and is in agreement.  -start this 2 weeks after increasing prozac.  -     amphetamine-dextroamphetamine (ADDERALL) 10 MG tablet; Take 1 tablet (10 mg) by mouth 2 times daily    Obsessive-compulsive disorder, unspecified type: not well controlled, increase prozac up to 40mg and continue therapy  -     FLUoxetine (PROZAC) 40 MG capsule; Take 1 capsule (40 mg) by mouth daily    Moderate episode of recurrent major depressive disorder (H)  -     FLUoxetine  (PROZAC) 40 MG capsule; Take 1 capsule (40 mg) by mouth daily    Anxiety: not well controlled, increase prozac to 40mg and continue therapy  -     FLUoxetine (PROZAC) 40 MG capsule; Take 1 capsule (40 mg) by mouth daily           Patient Instructions   Let's plan to increase prozac to 40mg daily to see if we can improve those OCD type symptoms and anxiety/stress.    Do continue therapy as well.    If this increased dose of prozac causes increasing fidgeting and restless or OCD symptoms please call me and we can decrease this too.    After 2 weeks this increase of prozac then start the adderall again.      Return in about 6 weeks (around 4/7/2020).    Nadeem Hernández MD  [facility]

## 2020-02-25 NOTE — PATIENT INSTRUCTIONS
Let's plan to increase prozac to 40mg daily to see if we can improve those OCD type symptoms and anxiety/stress.    Do continue therapy as well.    If this increased dose of prozac causes increasing fidgeting and restless or OCD symptoms please call me and we can decrease this too.    After 2 weeks this increase of prozac then start the adderall again.

## 2020-02-26 ENCOUNTER — HOSPITAL ENCOUNTER (OUTPATIENT)
Dept: BEHAVIORAL HEALTH | Facility: CLINIC | Age: 25
End: 2020-02-26
Attending: PSYCHIATRY & NEUROLOGY
Payer: COMMERCIAL

## 2020-02-26 PROCEDURE — G0177 OPPS/PHP; TRAIN & EDUC SERV: HCPCS

## 2020-02-26 PROCEDURE — 90853 GROUP PSYCHOTHERAPY: CPT

## 2020-02-26 PROCEDURE — H0035 MH PARTIAL HOSP TX UNDER 24H: HCPCS

## 2020-02-26 NOTE — GROUP NOTE
"Life Skills/OT Group Note    PATIENT'S NAME: Daniel Thomas  MRN:   [id number]  :   1995  ACCT. NUMBER: [id number]  DATE OF SERVICE: 20  START TIME:  3:00 PM  END TIME:  3:50 PM  FACILITATOR: Dale Whitley OT  TOPIC:  Life Skills Group: Sensory Approaches in Mental Health  Adult Mental Health Day Treatment  TRACK: 3B    NUMBER OF PARTICIPANTS: 8    Summary of Group / Topics Discussed:  Sensory Approaches in Mental Health:  Self Regulation: Intro to Window of tolerance. Patients were provided with education on Autonomic Nervous System activation and to develop self awareness.  Patients were taught how to recognize specific signs and symptoms of their individualized state of arousal as a first step to help them make changes when needed.  They worked on developing their understanding of their individual experiences with \"being in their window, shutter, or hitting the wall\". Patients applied taught concepts and reflected on their experiences of how they move through their window of tolerance throughout the context of a day and specific environments. Began to explore interoceptive awareness in preparation for next weeks experiential session on Window of Tolerance concepts.     Patient Session Goals / Objectives:    Identified specific and individualized neurosensory skills to help when distressed and for crisis management    Identified signs and symptoms of current level of arousal and ways to make changes in level of arousal when needed    Established a plan for practice of these skills in their own environments        Patient Participation / Response:  Minimally participated, only when prompted / asked.    Patient presentation: Low energy, had a hard time staying focused. Discussed with him ways to keep himself activated enough to participate including standing when needed and getting up and walking around the group. , Verbalized understanding of content and Patient would benefit from additional " opportunities to practice the content to be able to generalize it to their everyday life with increased intentionality, consistency, and efficacy in support of their psychiatric recovery    Treatment Plan:  Patient has a current master individualized treatment plan.  See Epic treatment plan for more information.    Dale Whitley, OT

## 2020-02-26 NOTE — GROUP NOTE
EBP Group Note    PATIENT'S NAME: Daniel Thomas  MRN:   [id number]  :   1995  ACCT. NUMBER: [id number]  DATE OF SERVICE: 20  START TIME:  2:00 PM  END TIME:  2:50 PM  FACILITATOR: Darlin Kemp  TOPIC:  EBP Group: Symptom Awareness  Adult Mental Health Day Treatment  TRACK: 3B    NUMBER OF PARTICIPANTS: 8    Summary of Group / Topics Discussed:  Symptom Awareness: Mood Disorders: Patients received a general overview of mood disorders including depressive disorders, anxiety disorders, and bipolar disorders and how it relates to their current symptoms. The purpose is to promote understanding of their diagnoses and how it impacts their functioning. Patients reviewed their current awareness of symptoms and diagnoses. Patients received information regarding diagnoses, etiology, cultural, and environmental factors as well as impact on functioning.     Patient Session Goals / Objectives:    Discussed patient individual symptoms and experiences    Reviewed diagnostic criteria and etiology of diagnoses         Patient Participation / Response:  Moderately participated, sharing some personal reflections / insights and adequately adequately received / provided feedback with other participants.    Identified / Expressed personal readiness to increase awareness of symptoms and apply skills as necessary    Treatment Plan:  Patient has a current master individualized treatment plan.  See Epic treatment plan for more information.    Darlin Kemp

## 2020-02-26 NOTE — PROGRESS NOTES
The Adult Day Treatment Program Medical Director, Dr. Wallace Brown, has been unavailable to meet face to face during the month of February 2020 for consultation.  DEACON Page, CNP has been the on-site provider available for consultation on an as needed basis.  Dr. Wallace Brown will meet with the treatment team upon his return in March 2020 for an official psychiatry staffing review.      Isabel Woodson [facility]  Adult Day Treatment

## 2020-02-26 NOTE — GROUP NOTE
"Process Group Note    PATIENT'S NAME: Daniel Thomas  MRN:   [id number]  :   1995  ACCT. NUMBER: [id number]  DATE OF SERVICE: 20  START TIME:  1:00 PM  END TIME:  1:50 PM  FACILITATOR: Darlin Kemp  TOPIC:  Process Group    Diagnoses:  Diagnoses:      Autism Spectrum Disorder 299.00(F84.0)  Associated with a known medical or genetic condition or environmental factor- per patient  296.33 (F33.2) Major Depressive Disorder, Recurrent Episode, Severe _ and With anxious distress  300.3 (F42) Obsessive Compulsive Disorder- per patient           Adult Mental Health Day Treatment  TRACK: 3B    NUMBER OF PARTICIPANTS: 8          Data:    Session content: At the start of this group, patients were invited to check in by identifying themselves, describing their current emotional status, and identifying issues to address in this group.   Area(s) of treatment focus addressed in this session included Symptom Management and Personal Safety.  Patient engaged in grounding activity (coloring/doodling) and endorsed feeling good. Patient goal is getting home early, barrier is time, and skills to use are time management.Patient reports seeing psychiatrist yesterday and addressing concerns regarding inadequate sleep. Patient indicated forgetting to tell Dr about the nightmares and agreed to send a message to update psychiatrists. Patient denied suicidal thoughts and declined processing time. The group discussion addressed relationships skills and practiced positive self affirmations statement at the end.    Therapeutic Interventions/Treatment Strategies:  Psychotherapist offered support, feedback and validation and reinforced use of skills. Interventions include Coping Skills: Discussed use of self-soothe skills to decrease distress in the body and Discussed how the use of intentional \"in the moment\" actions can help reduce distress, Mindfulness: Encouraged a plan to use mindfulness skills in daily life and Relationship " Skills: Encouraged development and maintenance  of healthy boundaries, Discussed strategies to promote healthier understanding of interpersonal relationships and Discussed relationships and ways to reduce conflict .    Assessment:    Patient response:   Patient responded to session by listening, being attentive, accepting support and verbalizing understanding    Possible barriers to participation / learning include: and no barriers identified    Health Issues:   None reported       Substance Use Review:   Substance Use: No active concerns identified.    Mental Status/Behavioral Observations  Appearance:   Appropriate   Eye Contact:   Poor  Psychomotor Behavior: Normal   Attitude:   Cooperative   Orientation:   All  Speech   Rate / Production: Normal    Volume:  Normal   Mood:    Normal  Affect:    Appropriate   Thought Content:   Safety denies any current safety concerns including suicidal ideation, self-harm, and homicidal ideation  Thought Form:  Coherent  Logical     Insight:    Fair     Plan:     Safety Plan: No current safety concerns identified.  Recommended that patient call 911 or go to the local ED should there be a change in any of these risk factors.     Barriers to treatment: None identified    Patient Contracts (see media tab):  None    Substance Use: Not addressed in session     Continue or Discharge: Patient will continue in Adult Day Treatment (ADT)  as planned. Patient is likely to benefit from learning and using skills as they work toward the goals identified in their treatment plan.      Darlin Kemp  February 27, 2020

## 2020-02-27 ENCOUNTER — HOSPITAL ENCOUNTER (OUTPATIENT)
Dept: BEHAVIORAL HEALTH | Facility: CLINIC | Age: 25
End: 2020-02-27
Attending: PSYCHIATRY & NEUROLOGY
Payer: COMMERCIAL

## 2020-02-27 PROCEDURE — G0177 OPPS/PHP; TRAIN & EDUC SERV: HCPCS

## 2020-02-27 PROCEDURE — 90853 GROUP PSYCHOTHERAPY: CPT

## 2020-02-27 NOTE — GROUP NOTE
Life Skills/OT Group Note    PATIENT'S NAME: Daniel Thomas  MRN:   [id number]  :   1995  ACCT. NUMBER: [id number]  DATE OF SERVICE: 20  START TIME:  2:00 PM  END TIME:  2:50 PM  FACILITATOR: Arsalan Galloway OTR/L  TOPIC:  Life Skills Group: Lifestyle Balance and Structure  Adult Mental Health Day Treatment  TRACK: 3B    NUMBER OF PARTICIPANTS: 6    Summary of Group / Topics Discussed:  Lifestyle Balance and Strucure:  Benefits of Leisure on Mental Health: Patients explored and learned about the benefits and possibilities of leisure activity to create lifestyle balance that supports their mental and physical wellbeing.  Patients were assisted to identify individualized leisure values and interests, recognized the benefits of leisure activity on mental health, and problem solved barriers to leisure engagement and strategies to overcome.  Patient engaged in an experiential leisure activity to gain self-awareness and build milieu social aspects and reflected on the impact the experiential activity had on their mood.       Patient Session Goals / Objectives:    Increased awareness of the importance of engagement in leisure activities to support lifestyle balance and perceived quality of life    Identified strategies to recognize and challenge barriers to leisure participation     Facilitated exploration of meaningful leisure interests and values    Practiced and reflected on how to generalize taught skills to their everyday life        Patient Participation / Response:  Fully participated with the group by sharing personal reflections / insights and openly received / provided feedback with other participants.    Patient Presentation: Calm,alert,focused with stable mood and thought process. Talkative and social with other group members. Also was able to effectively communicate needs.    Treatment Plan:  Patient has a current master individualized treatment plan.  See Epic treatment plan for more  information.    Arsalan Galloway, OTR/L

## 2020-02-27 NOTE — GROUP NOTE
EBP Group Note    PATIENT'S NAME: Daniel Thomas  MRN:   [id number]  :   1995  ACCT. NUMBER: [id number]  DATE OF SERVICE: 20  START TIME:  3:00 PM  END TIME:  3:50 PM  FACILITATOR: Darlin Kemp  TOPIC:  EBP Group: Behavioral Activation  Adult Mental Health Day Treatment  TRACK: 3B    NUMBER OF PARTICIPANTS: 7    Summary of Group / Topics Discussed:  Behavioral Activation: [place]ells Ahead: {Patients identified situations that prompt unwanted and unhelpful emotions / thoughts / behaviors.   Patients discussed how to problem solve by proactively using coping skills in potentially difficult situations. Components included describing the situation, brainstorming coping skills, imagining how scenario can/will unfold, rehearsing the action plan, and practicing relaxation to follow.  Patients practiced using these skills to reduce symptom distress and increase effective coping  behaviors.      Patient Session Goals / Objectives:    Identify difficult situation(s), and gain proficiency with alternative behaviors / skills to problem solve.    Increase confidence using coping skills through group practice in session.    Receive and provide feedback regarding skill development.    Apply coping skills in daily life situations.      Patient Participation / Response:  Moderately participated, sharing some personal reflections / insights and adequately adequately received / provided feedback with other participants.    Shared experiences and challenges with making behavioral changes, Identified barriers to change and Identified / Expressed personal readiness to make behavioral change    Treatment Plan:  Patient has a current master individualized treatment plan.  See Epic treatment plan for more information.    Darlin Kemp

## 2020-02-27 NOTE — GROUP NOTE
Process Group Note    PATIENT'S NAME: Daniel Thomas  MRN:   [id number]  :   1995  ACCT. NUMBER: [id number]  DATE OF SERVICE: 20  START TIME:  1:00 PM  END TIME:  1:50 PM  FACILITATOR: Darlin Kemp  TOPIC:  Process Group    Diagnoses:  Diagnoses:      Autism Spectrum Disorder 299.00(F84.0)  Associated with a known medical or genetic condition or environmental factor- per patient  296.33 (F33.2) Major Depressive Disorder, Recurrent Episode, Severe _ and With anxious distress  300.3 (F42) Obsessive Compulsive Disorder- per patient           Adult Mental Health Day Treatment  TRACK: 3B    NUMBER OF PARTICIPANTS: 6          Data:    Session content: At the start of this group, patients were invited to check in by identifying themselves, describing their current emotional status, and identifying issues to address in this group.   Area(s) of treatment focus addressed in this session included Symptom Management and Personal Safety.  Patient engaged in grounding activity (coloring) and endorsed feeling good. Patient goal is to make it home early and go to bed, barrier is not identified, and skills to use are willingness.  Patient reports achieving goal yesterday of making it home early, and getting adequate sleep. Patient denied suicidal thoughts, and declined processing time but offered feedback and support to peers. The discussion addressed coping skills, core beliefs, and safety planning. Patient denies suicidal thoughts and is future oriented. It is recommended patient follow up with psychiatrist and continue working with disability services in the school setting.    Therapeutic Interventions/Treatment Strategies:  Psychotherapist offered support, feedback and validation and reinforced use of skills. Interventions include Cognitive Restructuring:  Assisted patient in formulating new neutral/positive alternatives to challenge less helpful / ineffective thoughts, Coping Skills: Discussed use of  "self-soothe skills to decrease distress in the body, Assisted patient in identifying 1-2 healthy distraction skills to reduce overall distress and Discussed how the use of intentional \"in the moment\" actions can help reduce distress and Mindfulness: Encouraged a plan to use mindfulness skills in daily life.    Assessment:    Patient response:   Patient responded to session by accepting feedback, giving feedback, listening, focusing on goals, being attentive, accepting support and verbalizing understanding    Possible barriers to participation / learning include: and no barriers identified    Health Issues:   None reported       Substance Use Review:   Substance Use: No active concerns identified.    Mental Status/Behavioral Observations  Appearance:   Appropriate   Eye Contact:   Fair   Psychomotor Behavior: Normal   Attitude:   Cooperative   Orientation:   All  Speech   Rate / Production: Normal    Volume:  Normal   Mood:    Anxious   Affect:    Appropriate   Thought Content:   Safety denies any current safety concerns including suicidal ideation, self-harm, and homicidal ideation  Thought Form:  Coherent  Logical     Insight:    Fair     Plan:     Safety Plan: No current safety concerns identified.  Recommended that patient call 911 or go to the local ED should there be a change in any of these risk factors.     Barriers to treatment: None identified    Patient Contracts (see media tab):  None    Substance Use: Not addressed in session     Continue or Discharge: Patient will continue in Adult Day Treatment (ADT)  as planned. Patient is likely to benefit from learning and using skills as they work toward the goals identified in their treatment plan.      Darlin Kemp  February 27, 2020  "

## 2020-03-02 ENCOUNTER — HOSPITAL ENCOUNTER (OUTPATIENT)
Dept: BEHAVIORAL HEALTH | Facility: CLINIC | Age: 25
End: 2020-03-02
Attending: PSYCHIATRY & NEUROLOGY
Payer: COMMERCIAL

## 2020-03-02 PROCEDURE — G0177 OPPS/PHP; TRAIN & EDUC SERV: HCPCS

## 2020-03-02 PROCEDURE — 90853 GROUP PSYCHOTHERAPY: CPT

## 2020-03-02 NOTE — GROUP NOTE
Psychoeducation Group Note    PATIENT'S NAME: Daniel Thomas  MRN:   [id number]  :   1995  ACCT. NUMBER: [id number]  DATE OF SERVICE: 3/02/20  START TIME:  1:00 PM  END TIME:  1:50 PM  FACILITATOR: Vivien Bueno RN  TOPIC: MH RN Group: [facility]  Adult Mental Health Day Treatment  TRACK:3B    NUMBER OF PARTICIPANTS: 7 (merged with DDP3)    Summary of Group / Topics Discussed:  Foundations of Health: Sexual health/risk reduction:   Patients were educated on safe sexual health practices including: safe use of birth control/contraceptive methods, barrier protection, and use of healthy boundaries. Major sexually transmitted infections and their signs, symptoms, transmission routes, and treatment options were discussed and prevention strategies were reviewed. Current recommendations for STI screening was discussed.     Patient Session Goals / Objectives:  ? Verbalized knowledge of safe sexual practices   ? Described sexually transmitted infections types, signs & symptoms, transmission routes and prevention strategies and behaviors  ? Identify current recommendations for STI screening        Patient Participation / Response:  Fully participated with the group by sharing personal reflections / insights and openly received / provided feedback with other participants.    Demonstrated understanding of topics discussed through group discussion and participation and Identified / Expressed personal readiness to practice skills    Treatment Plan:  Patient has a current master individualized treatment plan.  See Epic treatment plan for more information.    Vivien Bueno RN

## 2020-03-03 NOTE — GROUP NOTE
"Process Group Note    PATIENT'S NAME: Daniel Thomas  MRN:   [id number]  :   1995  ACCT. NUMBER: [id number]  DATE OF SERVICE: 3/02/20  START TIME:  2:00 PM  END TIME:  2:50 PM  FACILITATOR: Darlin Kemp  TOPIC:  Process Group    Diagnoses:  Diagnoses:      Autism Spectrum Disorder 299.00(F84.0)  Associated with a known medical or genetic condition or environmental factor- per patient  296.33 (F33.2) Major Depressive Disorder, Recurrent Episode, Severe _ and With anxious distress  300.3 (F42) Obsessive Compulsive Disorder- per patient           Adult Mental Health Day Treatment  TRACK: 3B    NUMBER OF PARTICIPANTS: 7          Data:    Session content: At the start of this group, patients were invited to check in by identifying themselves, describing their current emotional status, and identifying issues to address in this group.   Area(s) of treatment focus addressed in this session included Symptom Management and Personal Safety.    Patient engaged in grounding activity and endorsed feeling proud about making it home in time to get to bed early. Patient goal is to schedule therapy appointment, barrier, is anxiety, and skills to use  is asking for help.The group address transition from program to community providers, safety planning and coping skills. The group practiced positive self affirmation at the end of group.  Therapeutic Interventions/Treatment Strategies:  Psychotherapist offered support, feedback and validation and reinforced use of skills. Interventions include Coping Skills: Discussed use of self-soothe skills to decrease distress in the body, Assisted patient in identifying 1-2 healthy distraction skills to reduce overall distress and Discussed how the use of intentional \"in the moment\" actions can help reduce distress and Mindfulness: Encouraged a plan to use mindfulness skills in daily life.    Assessment:    Patient response:   Patient responded to session by verbalizing " understanding    Possible barriers to participation / learning include: and no barriers identified    Health Issues:   None reported       Substance Use Review:   Substance Use: No active concerns identified.    Mental Status/Behavioral Observations  Appearance:   Appropriate   Eye Contact:   Poor  Psychomotor Behavior: Normal   Attitude:   Cooperative   Orientation:   All  Speech   Rate / Production: Normal    Volume:  Normal   Mood:    Anxious   Affect:    Worrisome   Thought Content:   Safety denies any current safety concerns including suicidal ideation, self-harm, and homicidal ideation  Thought Form:  Coherent  Logical     Insight:    Fair     Plan:     Safety Plan: No current safety concerns identified.  Recommended that patient call 911 or go to the local ED should there be a change in any of these risk factors.     Barriers to treatment: None identified    Patient Contracts (see media tab):  None    Substance Use: Not addressed in session     Continue or Discharge: Patient will continue in Adult Day Treatment (ADT)  as planned. Patient is likely to benefit from learning and using skills as they work toward the goals identified in their treatment plan.      Darlin Kemp  March 4, 2020

## 2020-03-03 NOTE — GROUP NOTE
Psychotherapy Group Note    PATIENT'S NAME: Daniel Thomas  MRN:   [id number]  :   1995  ACCT. NUMBER: [id number]  DATE OF SERVICE: 3/02/20  START TIME: 3:00 PM  END TIME: 3:50 PM  FACILITATOR: Darlin Kemp  TOPIC:  EBP Group: Emotions Management  Adult Mental Health Day Treatment  TRACK: 3B    NUMBER OF PARTICIPANTS: 7    Summary of Group / Topics Discussed:  Emotions Management: Model of Emotions: Patients were introduced to the cyclical model of emotions.  Explored emotions are shaped by different events and one s interpretation of events.  Group discussed how emotions begin with an event, followed by one s interpretation, followed by associated feelings.  Discussion included a review of personal urges and actions that can/do follow an emotional experience in the patient s life, and the end results.    Patient Session Goals / Objectives:    Demonstrate understanding of types various emotions.    Identify and discuss specific emotions and when they occur; understand triggers.    Identify individual emotions and physical sensations that accompany them.    Discuss urges and actions, and how to influence the intensity of emotional reactions and disrupt the cycle.      Discuss barriers to emotional regulation.    Choose 1-2 strategies to assist with emotional response to potentially distressing situations.      Patient Participation / Response:  Minimally participated, only when prompted / asked.    Identified / Expressed personal readiness to practice new emotions management skills    Treatment Plan:  Patient has a current master individualized treatment plan.  See Epic treatment plan for more information.    Darlin Kemp

## 2020-03-04 ENCOUNTER — HOSPITAL ENCOUNTER (OUTPATIENT)
Dept: BEHAVIORAL HEALTH | Facility: CLINIC | Age: 25
End: 2020-03-04
Attending: PSYCHIATRY & NEUROLOGY
Payer: COMMERCIAL

## 2020-03-04 PROCEDURE — 90853 GROUP PSYCHOTHERAPY: CPT

## 2020-03-04 PROCEDURE — G0177 OPPS/PHP; TRAIN & EDUC SERV: HCPCS

## 2020-03-04 NOTE — GROUP NOTE
"Psychoeducation Group Note    PATIENT'S NAME: Daniel Thomas  MRN:   [id number]  :   1995  ACCT. NUMBER: [id number]  DATE OF SERVICE: 3/04/20  START TIME:  3:00 PM  END TIME:  3:50 PM  FACILITATOR: Vivien Bueno RN  TOPIC: MH PHP OT Group: Self- Regulation Skills  Adult Mental Health Day Treatment  TRACK: 3B    NUMBER OF PARTICIPANTS: 4    Summary of Group / Topics Discussed:  Self-Regulation Skills: Coping Through the Senses Introduction: Patients were introduced and taught about neurosensory based skills and strategies related to supporting effective self regulation skills.  Patients were taught about the eight sensory systems (external and internal) and how they can be used for coping with mental health symptoms and stressors.  Patients were provided with an experiential opportunity to increase self-awareness of helpful sensory input and self care activities. Patients were introduced on how to create supportive environments that encourage use of these skills.    Writer also directed pts to seek further instruction and insights from OT r/t sensory-based strategies. Pts spent time in the sensory room, plays \"Coping Through the Senses\", and discussed the worksheet  \"Calming and Alerting Characteristics of Sensory Input\".    Patient Session Goals / Objectives:    Review/learn the 8 sensory systems and how they relate to self-regulation    Identified specific and individualized neurosensory skills to help when distressed      Identified skills learned and how this applies to current daily life    Established a plan for practice of these skills in their own environments      Patient Participation / Response:  Fully participated with the group by sharing personal reflections / insights and openly received / provided feedback with other participants.    Demonstrated understanding of content through participation in education and sensory activities by using breathing fidget, rocking chair, and preferring " lower lighting.     Treatment Plan:  Patient has a current master individualized treatment plan.  See Epic treatment plan for more information.    Vivien Bueno RN

## 2020-03-04 NOTE — GROUP NOTE
Process Group Note    PATIENT'S NAME: Daniel Thomas  MRN:   [id number]  :   1995  ACCT. NUMBER: [id number]  DATE OF SERVICE: 3/04/20  START TIME:  1:00 PM  END TIME:  1:50 PM  FACILITATOR: aDrlin Kemp  TOPIC:  Process Group    Diagnoses:  Diagnoses:      Autism Spectrum Disorder 299.00(F84.0)  Associated with a known medical or genetic condition or environmental factor- per patient  296.33 (F33.2) Major Depressive Disorder, Recurrent Episode, Severe _ and With anxious distress  300.3 (F42) Obsessive Compulsive Disorder- per patient           Adult Mental Health Day Treatment  TRACK: 3B    NUMBER OF PARTICIPANTS: 5          Data:    Session content: At the start of this group, patients were invited to check in by identifying themselves, describing their current emotional status, and identifying issues to address in this group.   Area(s) of treatment focus addressed in this session included Symptom Management and Personal Safety.    Patient arrived to group 30 minutes early to meet with this writer to get support with scheduling an appointment for a new therapist.  Patient engaged in grounding activity (coloring) and endorsed feeling on edge.Patient goal is to schedule therapist appointment, barrier is asking for help, and skills to use are willingness  Patient reports having a sressful day at work. Patient was able to identify events that trigger anxiety and explored ways of coping.. Patient denies safety concerns, and offers feedback and support to peers. The group discussion addressed boundaries in relationships, following back into maladaptive patterns and radical acceptance. Group members practiced positive affirmations at the end of group.  Therapeutic Interventions/Treatment Strategies:  Psychotherapist offered support, feedback and validation and reinforced use of skills. Interventions include Coping Skills: Facilitated discussion on learning and applying radical acceptance skill, Discussed use  "of self-soothe skills to decrease distress in the body, Assisted patient in identifying 1-2 healthy distraction skills to reduce overall distress, Discussed how the use of intentional \"in the moment\" actions can help reduce distress, Facilitated understanding of  what factors may contribute to symptom relapse and skills plan to manage symptom relapse  and Addressed barriers to utilizing coping skills when in distress and Relationship Skills: Encouraged development and maintenance  of healthy boundaries and Discussed strategies to promote healthier understanding of interpersonal relationships.    Assessment:    Patient response:   Patient responded to session by accepting feedback, giving feedback, listening, focusing on goals, being attentive, accepting support and verbalizing understanding    Possible barriers to participation / learning include: and no barriers identified    Health Issues:   None reported       Substance Use Review:   Substance Use: No active concerns identified.    Mental Status/Behavioral Observations  Appearance:   Appropriate   Eye Contact:   Fair   Psychomotor Behavior: Normal   Attitude:   Cooperative   Orientation:   All  Speech   Rate / Production: Normal    Volume:  Normal   Mood:    Anxious   Affect:    Worrisome   Thought Content:   Safety denies any current safety concerns including suicidal ideation, self-harm, and homicidal ideation  Thought Form:  Coherent  Logical     Insight:    Fair     Plan:     Safety Plan: No current safety concerns identified.  Recommended that patient call 911 or go to the local ED should there be a change in any of these risk factors.     Barriers to treatment: None identified    Patient Contracts (see media tab):  None    Substance Use: Not addressed in session     Continue or Discharge: Patient will continue in Adult Day Treatment (ADT)  as planned. Patient is likely to benefit from learning and using skills as they work toward the goals identified in " their treatment plan.      Darlin Kemp  March 5, 2020

## 2020-03-04 NOTE — GROUP NOTE
Psychotherapy Group Note    PATIENT'S NAME: Daniel Thomas  MRN:   [id number]  :   1995  ACCT. NUMBER: [id number]  DATE OF SERVICE: 3/04/20  START TIME:  2:00 PM  END TIME:  2:50 PM  FACILITATOR: Darlin Kemp  TOPIC:  EBP Group: Emotions Management  Adult Mental Health Day Treatment  TRACK: 3B    NUMBER OF PARTICIPANTS: 5    Summary of Group / Topics Discussed:  Emotions Management: Guilt and Shame: Patients explored and shared personal experiences associated with feelings of guilt and shame.  Group explored how these feelings develop, what they mean to each individual, and how to increase acceptance and usefulness of these feelings.  Group members assisted one another to contextualize these concepts and promote healing.     Patient Session Goals / Objectives:    Discuss and review definitions and personal views/experiences with guilt and shame    Understand the differences between guilt and shame    Explore how feelings of guilt and shame impact functioning    Understand and practice strategies to manage difficult emotions and move towards healing    Understand and normalize difficult emotions through group discussion    Understand the utility of guilt and shame    Target  unwanted  emotions for change      Patient Participation / Response:  Moderately participated, sharing some personal reflections / insights and adequately adequately received / provided feedback with other participants.    Demonstrated understanding of topics discussed through group discussion and participation, Expressed understanding of the relevance / importance of emotions management skills at distressing times in life, Self-aware of experiences with difficult emotions, and strategies to employ to manage them and Identified / Expressed personal readiness to practice new emotions management skills    Treatment Plan:  Patient has a current master individualized treatment plan.  See Epic treatment plan for more  information.    Darlin Kemp

## 2020-03-05 ENCOUNTER — HOSPITAL ENCOUNTER (OUTPATIENT)
Dept: BEHAVIORAL HEALTH | Facility: CLINIC | Age: 25
End: 2020-03-05
Attending: PSYCHIATRY & NEUROLOGY
Payer: COMMERCIAL

## 2020-03-05 PROCEDURE — 90853 GROUP PSYCHOTHERAPY: CPT

## 2020-03-05 PROCEDURE — G0177 OPPS/PHP; TRAIN & EDUC SERV: HCPCS

## 2020-03-05 NOTE — GROUP NOTE
Psychotherapy Group Note    PATIENT'S NAME: Daniel Thomas  MRN:   [id number]  :   1995  ACCT. NUMBER: [id number]  DATE OF SERVICE: 3/05/20  START TIME:  3:00 PM  END TIME:  3:50 PM  FACILITATOR: Darlin Kemp  TOPIC:  EBP Group: Self-Awareness  Adult Mental Health Day Treatment  TRACK: 4    NUMBER OF PARTICIPANTS: 4    Summary of Group / Topics Discussed:  Self-Awareness: Gratitude: Topic focused on assisting patients in identifying key concepts in gratitude. Patients discussed the benefits of practicing gratitude and its impact on mood improvement, mindfulness, and perspective. Patients worked to increase time spent on recognition and appreciation of what is positive and working in their lives. Patients discussed the concepts and benefits of feeling grateful. The goal is to reduce rumination and negative thinking resulting in increased mindfulness and resilience. Patients specifically discussed how they can practice and problem solve barriers to daily gratitude practice.     Patient Session Goals / Objectives:    [place] the concept and benefits of gratitude    Identified ways to practice gratitude in daily life    Problem solved barriers to practicing gratitude      Patient Participation / Response:  Minimally participated, only when prompted / asked.    Identified / Expressed readiness to act intentionally, increase self-compassion, promote personal growth    Treatment Plan:  Patient has a current master individualized treatment plan.  See Epic treatment plan for more information.    Darlin Kemp

## 2020-03-05 NOTE — GROUP NOTE
Psychoeducation Group Note    PATIENT'S NAME: Daniel Thomas  MRN:   [id number]  :   1995  ACCT. NUMBER: [id number]  DATE OF SERVICE: 3/05/20  START TIME:  2:00 PM  END TIME:  2:50 PM  FACILITATOR: Arsalan Galloway OTR/L  TOPIC: MH Life Skills Group: Resiliency Development  Adult Mental Health Day Treatment  TRACK: 3B    NUMBER OF PARTICIPANTS: 5    Summary of Group / Topics Discussed:  Resiliency Development:  Self Esteem Perception: Patients were given the opportunity to reflect and identified the positive aspects of their life.  Patients were taught about the importance of self kindness on mental health wellbeing.  Patients were also given the opportunity to improve self efficacy, self sufficiency, quality of life and a sense of mastery and competency by identifying positive aspects of their life and to instill hope.      Patient Session Goals / Objectives:    Identified personal strengths and qualities about themselves as a way to provide hope and build self-compassion as a way to effectively manage both mental health and substance abuse/abuse symptoms     Improved awareness of positive qualities and how this contribute to the process of recovery and mental health wellbeing and resiliency      Established a plan for practice of these skills in their own environments    Practiced and reflected on how to generalize taught skills to their everyday life    Increase understanding of self-esteem        Patient Participation / Response:  Moderately participated, sharing some personal reflections / insights and adequately adequately received / provided feedback with other participants.    Patient Presentation: Calm,alert,focused with stable mood and thought process. Some social interaction with other group members.    Treatment Plan:  Patient has a current master individualized treatment plan.  See Epic treatment plan for more information.    LIANE Verma/TIMOTHY

## 2020-03-06 NOTE — GROUP NOTE
"Process Group Note    PATIENT'S NAME: Daniel Thomas  MRN:   [id number]  :   1995  ACCT. NUMBER: [id number]  DATE OF SERVICE: 3/05/20  START TIME:  1:00 PM  END TIME:  1:50 PM  FACILITATOR: Darlin Kemp  TOPIC:  Process Group    Diagnoses:  Diagnoses:      Autism Spectrum Disorder 299.00(F84.0)  Associated with a known medical or genetic condition or environmental factor- per patient  296.33 (F33.2) Major Depressive Disorder, Recurrent Episode, Severe _ and With anxious distress  300.3 (F42) Obsessive Compulsive Disorder- per patient           Adult Mental Health Day Treatment  TRACK: 3B    NUMBER OF PARTICIPANTS: 5          Data:    Session content: At the start of this group, patients were invited to check in by identifying themselves, describing their current emotional status, and identifying issues to address in this group.   Area(s) of treatment focus addressed in this session included Symptom Management and Personal Safety.  Patient observed while the group engaged in grounding activity. Patient endorsed feeling good. Goal is to get to bed on time, barrier is video games, and skills to use is time management. Patient noted safety concerns, and declined checking in. The group discussion addressed improving the moment, understandings emotions, and anger. Patient engaged in the discussion and reports feeling angry that his father was not a good dad and expressed, \"I don't want kids, the tree doesn't fall far from the apple.\" Patient received feedback, support and the discussion addressed core distorted beliefs and the impact of trauma.        Therapeutic Interventions/Treatment Strategies:  Psychotherapist offered support, feedback and validation and reinforced use of skills. Interventions include Coping Skills: Facilitated discussion on learning and applying radical acceptance skill, Discussed use of self-soothe skills to decrease distress in the body, Assisted patient in identifying 1-2 healthy " "distraction skills to reduce overall distress and Discussed how the use of intentional \"in the moment\" actions can help reduce distress and Mindfulness: Encouraged a plan to use mindfulness skills in daily life.    Assessment:    Patient response:   Patient responded to session by accepting feedback, listening and verbalizing understanding    Possible barriers to participation / learning include: and no barriers identified    Health Issues:   None reported       Substance Use Review:   Substance Use: No active concerns identified.    Mental Status/Behavioral Observations  Appearance:   Appropriate   Eye Contact:   Poor  Psychomotor Behavior: Normal   Attitude:   Cooperative   Orientation:   All  Speech   Rate / Production: Normal    Volume:  Normal   Mood:    Normal  Affect:    Flat   Thought Content:   Safety denies any current safety concerns including suicidal ideation, self-harm, and homicidal ideation  Thought Form:  Coherent     Insight:    Fair     Plan:     Safety Plan: No current safety concerns identified.  Recommended that patient call 911 or go to the local ED should there be a change in any of these risk factors.     Barriers to treatment: None identified    Patient Contracts (see media tab):  None    Substance Use: Not addressed in session     Continue or Discharge: Patient will continue in Adult Day Treatment (ADT)  as planned. Patient is likely to benefit from learning and using skills as they work toward the goals identified in their treatment plan.      Darlin Kemp  March 6, 2020  "

## 2020-03-09 ENCOUNTER — HOSPITAL ENCOUNTER (OUTPATIENT)
Dept: BEHAVIORAL HEALTH | Facility: CLINIC | Age: 25
End: 2020-03-09
Attending: PSYCHIATRY & NEUROLOGY
Payer: COMMERCIAL

## 2020-03-09 PROCEDURE — 90853 GROUP PSYCHOTHERAPY: CPT | Performed by: SOCIAL WORKER

## 2020-03-09 PROCEDURE — G0177 OPPS/PHP; TRAIN & EDUC SERV: HCPCS

## 2020-03-09 PROCEDURE — 90853 GROUP PSYCHOTHERAPY: CPT | Performed by: COUNSELOR

## 2020-03-09 NOTE — GROUP NOTE
Psychoeducation Group Note    PATIENT'S NAME: Daniel Thomas  MRN:   [id number]  :   1995  ACCT. NUMBER: [id number]  DATE OF SERVICE: 3/09/20  START TIME:  2:00 PM  END TIME:  2:50 PM  FACILITATOR: Vivien Bueno RN  TOPIC: MH RN Group: [facility]  Adult Mental Health Day Treatment  TRACK: 3B    NUMBER OF PARTICIPANTS: 7    Summary of Group / Topics Discussed:  Foundations of Health: Nutrition: Macronutrients: Patient were provided education on major macronutrients, their role in the body, and why it is important to meet daily nutritional needs. Obstacles to meeting daily nutritional needs were identified in group discussion and strategies to promote improved nutrition were explored. Macronutrients discussed include: carbohydrates, proteins and amino acids, fats, fiber, and water.     Patient Session Goals / Objectives:  ? Discussed the role of diet on mood, physical health, energy level, and the development of chronic disease.  ? Identified daily nutritional needs recommended by the [employer] via My Plate education resources  ? Developing increased health literacy skills in finding credible nutrition information from reliable sources        Patient Participation / Response:  Moderately participated, sharing some personal reflections / insights and adequately adequately received / provided feedback with other participants.    Verbalized understanding of foundations of health topic    Treatment Plan:  Patient has a current master individualized treatment plan.  See Epic treatment plan for more information.    Vivien Bueno RN

## 2020-03-09 NOTE — GROUP NOTE
Psychotherapy Group Note    PATIENT'S NAME: Daniel Thomas  MRN:   [id number]  :   1995  ACCT. NUMBER: [id number]  DATE OF SERVICE: 3/09/20  START TIME:  3:00 PM  END TIME:  3:50 PM  FACILITATOR: Kindra Cortez LICSW  TOPIC:  EBP Group: Coping Skills  Adult Mental Health Day Treatment  TRACK: 3B    NUMBER OF PARTICIPANTS: 7    Summary of Group / Topics Discussed:  Coping Skills: Additional Coping Skills:  Patients discussed and practiced  unhealthy vs. Healthy coping skills.  Reviewed the benefits of applying the aforementioned coping strategies.  Patients explored how these strategies might be applied to daily stressors or distressing situations.    Patient Session Goals / Objectives:    Understand the purpose and benefits of applying mindful coping strategies    Address barriers to utilizing coping skills when in distress.        Patient Participation / Response:  Fully participated with the group by sharing personal reflections / insights and openly received / provided feedback with other participants.    Demonstrated understanding of topics discussed through group discussion and participation, Expressed understanding of the relevance / importance of coping skills at distressing times in life, Demonstrated knowledge of when to consider using a variety of coping skills in daily life and Identified barriers to applying coping skills    Treatment Plan:  Patient has a current master individualized treatment plan.  See Epic treatment plan for more information.    SHASHANK Dubois

## 2020-03-09 NOTE — GROUP NOTE
"Process Group Note    PATIENT'S NAME: Daniel Thomas  MRN:   [id number]  :   1995  ACCT. NUMBER: [id number]  DATE OF SERVICE: 3/09/20  START TIME:  1:00 PM  END TIME:  1:50 PM  FACILITATOR: Bella Lagunas LPCC  TOPIC:  Process Group    Diagnoses:  Autism Spectrum Disorder 299.00(F84.0)  Associated with a known medical or genetic condition or environmental factor- per patient  296.33 (F33.2) Major Depressive Disorder, Recurrent Episode, Severe _ and With anxious distress  300.3 (F42) Obsessive Compulsive Disorder- per patient      Adult Mental Health Day Treatment  TRACK: 3B    NUMBER OF PARTICIPANTS: 7          Data:    Session content: At the start of this group, patients were invited to check in by identifying themselves, describing their current emotional status, and identifying issues to address in this group.   Area(s) of treatment focus addressed in this session included Symptom Management and Personal Safety.  He reported feeling \"pretty good.\" He denied any safety concerns. He noted limited sleep. Patient also noted that he stopped taking his medication and will follow up with his provider. His goal is to sleep more hours. He provided supportive feedback to the group. Patient declined additional process time.     Therapeutic Interventions/Treatment Strategies:  Psychotherapist offered support, feedback and validation and reinforced use of skills.    Assessment:    Patient response:   Patient responded to session by giving feedback, listening and being attentive    Possible barriers to participation / learning include: and no barriers identified    Health Issues:   None reported       Substance Use Review:   Substance Use: Last use: 3/8    Mental Status/Behavioral Observations  Appearance:   Appropriate   Eye Contact:   Fair   Psychomotor Behavior: Restless   Attitude:   Cooperative   Orientation:   All  Speech   Rate / Production: Normal    Volume:  Normal   Mood:    Anxious  Depressed "   Affect:    Constricted   Thought Content:   Clear  Thought Form:  Coherent  Logical     Insight:    Fair     Plan:     Safety Plan: No current safety concerns identified.  Recommended that patient call 911 or go to the local ED should there be a change in any of these risk factors.     Barriers to treatment: None identified    Patient Contracts (see media tab):  None    Substance Use: Not addressed in session     Continue or Discharge: Patient will continue in Adult Day Treatment (ADT)  as planned. Patient is likely to benefit from learning and using skills as they work toward the goals identified in their treatment plan.      Bella Lagunas, [facility]  March 9, 2020

## 2020-03-11 ENCOUNTER — HOSPITAL ENCOUNTER (OUTPATIENT)
Dept: BEHAVIORAL HEALTH | Facility: CLINIC | Age: 25
End: 2020-03-11
Attending: PSYCHIATRY & NEUROLOGY
Payer: COMMERCIAL

## 2020-03-11 PROCEDURE — G0177 OPPS/PHP; TRAIN & EDUC SERV: HCPCS

## 2020-03-11 PROCEDURE — 90853 GROUP PSYCHOTHERAPY: CPT

## 2020-03-11 NOTE — GROUP NOTE
Psychoeducation Group Note    PATIENT'S NAME: Daniel Thomas  MRN:   [id number]  :   1995  ACCT. NUMBER: [id number]  DATE OF SERVICE: 3/11/20  START TIME:  3:00 PM  END TIME:  3:50 PM  FACILITATOR: Dale Whitley OT  TOPIC: MH Life Skills Group: Sensory Approaches in Mental Health  Adult Mental Health Day Treatment  TRACK: 3B    NUMBER OF PARTICIPANTS: 6    Summary of Group / Topics Discussed:  Sensory Approaches in Mental Health:  Mindfulness Focused Activity:   Patients were provided with psychoeducation/review around the Autonomic nervous system and the concept of developing self awareness around their window of tolerance to support self regulation skills and efficacy. Skilled facilitation of an experiential opportunity to engage in a mindful expressive process that incorporated color, line, geometric pattern, and words that describe how it feels to them to be in their window of tolerance. Purpose it to practice mindfulness as they reflect on their autonomic states of arousal.    Patient Session Goals / Objectives:    Identified how using mindful focused activities can be used for stress management, self care, health maintenance, and self regulation      Improved awareness of different types of activities that promote relaxation and stress management as a part of good self care, build resiliency, and prevent relapse and how this applies to current daily life    Established a plan for practice of these skills in their own environments    Practiced and reflected on how to generalize taught skills to their everyday life        Patient Participation / Response:  Fully participated with the group by sharing personal reflections / insights and openly received / provided feedback with other participants.    Verbalized understanding of content and Patient would benefit from additional opportunities to practice the content to be able to generalize it to their everyday life with increased intentionality,  consistency, and efficacy in support of their psychiatric recovery    Treatment Plan:  Patient has a current master individualized treatment plan.  See Epic treatment plan for more information.    Dale Whitley, OT

## 2020-03-11 NOTE — GROUP NOTE
Psychotherapy Group Note    PATIENT'S NAME: Daniel Thomas  MRN:   [id number]  :   1995  ACCT. NUMBER: [id number]  DATE OF SERVICE: 3/11/20  START TIME:  2:00 PM  END TIME:  2:50 PM  FACILITATOR: Darlin Kemp  TOPIC:  EBP Group: Specialty Awareness  Adult Mental Health Day Treatment  TRACK: 3B    NUMBER OF PARTICIPANTS: 5    Summary of Group / Topics Discussed:  Specialty Topics: Trauma and PTSD: Patients were provided with information to understand the types and origins of trauma, the relationship between trauma and PTSD, the scope of Trauma-Informed Care, and treatment options. Patients were able to explore how trauma may have impacted their functioning directly or indirectly, with reference to the the complexity of trauma and associated treatment options. Patients reviewed their current awareness of this topic and relevance to their functioning. Individual experiences with symptoms and treatment options were discussed.     Patient Session Goals / Objectives:    Discussed definitions of trauma, Trauma-Informed Care, PTSD    Discussed how traumatic experiences affect the individual and their relationships (directly, indirectly, brain development and function)    Identified how a history of trauma or exposure to trauma may impact group work    Assisted patients to find ways to adapt functioning in light of past traumatic experience(s), and to identify suitable treatment options and community resources      Patient Participation / Response:  Moderately participated, sharing some personal reflections / insights and adequately adequately received / provided feedback with other participants.    Identified / Expressed readiness to act on skill suggestions discussed in topic    Treatment Plan:  Patient has a current master individualized treatment plan.  See Epic treatment plan for more information.    Darlin Kemp

## 2020-03-11 NOTE — PROGRESS NOTES
Past Medical History:   Diagnosis Date     Depressive disorder        Current Outpatient Medications:      amphetamine-dextroamphetamine (ADDERALL) 10 MG tablet, Take 1 tablet (10 mg) by mouth 2 times daily, Disp: 60 tablet, Rfl: 0     FLUoxetine (PROZAC) 40 MG capsule, Take 1 capsule (40 mg) by mouth daily, Disp: 30 capsule, Rfl: 1  Psychiatry staffing: case discussed  Diagnosis:  MDD, adhd ASD, ocd per pt.  Checking rituals noted.  Recent suicide of co-worker affected him, but patient is safe currently.

## 2020-03-12 NOTE — GROUP NOTE
"Process Group Note    PATIENT'S NAME: Daniel Thomas  MRN:   [id number]  :   1995  ACCT. NUMBER: [id number]  DATE OF SERVICE: 3/11/20  START TIME:  1:00 PM  END TIME:  1:50 PM  FACILITATOR: Darlin Kemp  TOPIC:  Process Group    Diagnoses:      Adult Mental Health Day Treatment  TRACK: 3B    NUMBER OF PARTICIPANTS: 5          Data:    Session content: At the start of this group, patients were invited to check in by identifying themselves, describing their current emotional status, and identifying issues to address in this group.   Area(s) of treatment focus addressed in this session included Symptom Management and Personal Safety.    Patient engaged in grounding activity and endorsed feeling anxious. Patient goal is to avoid stopping places, get home early, barrier is focusing on wants, needs, and skills to use is opposite behavior. Patient denied suicidal thoughts, and declined processing time. Patient checked in towards the end of group and resorts feeling an increase in irritability, anger, and taking concerns about a peers at work to a supervisor. Patient explored ways he has managed emotions and plan to manage anger. Patient offered feedback and support to another peer in group. The group discussion addressed barrier to using skills, emotions management (opposite behavior) and anger management strategies. The group practiced positive self/peer affirmations.    Therapeutic Interventions/Treatment Strategies:  Psychotherapist offered support, feedback and validation and reinforced use of skills. Interventions include Coping Skills: Discussed use of self-soothe skills to decrease distress in the body, Assisted patient in identifying 1-2 healthy distraction skills to reduce overall distress, Discussed how the use of intentional \"in the moment\" actions can help reduce distress, Reviewed patients current calming practices and discussed a more formal way of practicing and accessing skills and Addressed " barriers to utilizing coping skills when in distress and Mindfulness: Encouraged a plan to use mindfulness skills in daily life.    Assessment:    Patient response:   Patient responded to session by accepting feedback, giving feedback, listening, focusing on goals, being attentive and verbalizing understanding    Possible barriers to participation / learning include: and no barriers identified    Health Issues:   None reported       Substance Use Review:   Substance Use: No active concerns identified.    Mental Status/Behavioral Observations  Appearance:   Appropriate   Eye Contact:   Fair   Psychomotor Behavior: Normal   Attitude:   Cooperative   Orientation:   All  Speech   Rate / Production: Normal    Volume:  Normal   Mood:    Anxious   Affect:    Worrisome   Thought Content:   Safety denies any current safety concerns including suicidal ideation, self-harm, and homicidal ideation  Thought Form:  Coherent  Logical     Insight:    Fair     Plan:     Safety Plan: No current safety concerns identified.  Recommended that patient call 911 or go to the local ED should there be a change in any of these risk factors.     Barriers to treatment: None identified    Patient Contracts (see media tab):  None    Substance Use: Not addressed in session     Continue or Discharge: Patient will continue in Adult Day Treatment (ADT)  as planned. Patient is likely to benefit from learning and using skills as they work toward the goals identified in their treatment plan.      Darlin Kemp  March 12, 2020

## 2020-03-13 ENCOUNTER — TELEPHONE (OUTPATIENT)
Dept: BEHAVIORAL HEALTH | Facility: CLINIC | Age: 25
End: 2020-03-13

## 2020-03-13 ENCOUNTER — TELEPHONE (OUTPATIENT)
Dept: FAMILY MEDICINE | Facility: CLINIC | Age: 25
End: 2020-03-13

## 2020-03-13 NOTE — TELEPHONE ENCOUNTER
Writer called patient about attendance and to check in about COVID-19 Sx. He was unable to talk much but said will call back. Denies fever, cough, SOB, contact with those who've recently travelled.     Vivien Bueno RN on 3/13/2020 at 1:36 PM

## 2020-03-14 NOTE — TELEPHONE ENCOUNTER
[facility] attending physician statement - mental health form started and routed to Dr. Hernández for completion and signature.

## 2020-03-16 ENCOUNTER — HOSPITAL ENCOUNTER (OUTPATIENT)
Dept: BEHAVIORAL HEALTH | Facility: CLINIC | Age: 25
End: 2020-03-16
Attending: PSYCHIATRY & NEUROLOGY
Payer: COMMERCIAL

## 2020-03-16 PROCEDURE — 90853 GROUP PSYCHOTHERAPY: CPT | Performed by: SOCIAL WORKER

## 2020-03-16 PROCEDURE — G0177 OPPS/PHP; TRAIN & EDUC SERV: HCPCS

## 2020-03-16 PROCEDURE — 90853 GROUP PSYCHOTHERAPY: CPT | Performed by: COUNSELOR

## 2020-03-16 NOTE — GROUP NOTE
Psychoeducation Group Note    PATIENT'S NAME: Daniel Thomas  MRN:   [id number]  :   1995  ACCT. NUMBER: [id number]  DATE OF SERVICE: 3/16/20  START TIME:  2:00 PM  END TIME:  2:50 PM  FACILITATOR: Vivien Bueno RN  TOPIC: TWIN RN Group: Mental Health Maintenance  Adult Mental Health Day Treatment  TRACK: 3B    NUMBER OF PARTICIPANTS: 4    Summary of Group / Topics Discussed:  Mental Health Maintenance:  Letting Go of Stress: Patients viewed 20 minute video which demonstrated simple proven techniques too quickly and effectively release stress.     Patient Session Goals / Objectives:  ? Patients discovered quick, easy and effective stress reduction techniques  ? Patients practiced techniques that were demonstrated on the video  ? Patients identified one technique they will use to cope with symptoms          Patient Participation / Response:  Fully participated with the group by sharing personal reflections / insights and openly received / provided feedback with other participants.    Demonstrated understanding of topics discussed through group discussion and participation and Identified / Expressed personal readiness to practice skills    Treatment Plan:  Patient has a current master individualized treatment plan.  See Epic treatment plan for more information.    Vivien Bueno RN

## 2020-03-16 NOTE — GROUP NOTE
Psychotherapy Group Note    PATIENT'S NAME: Daniel Thomas  MRN:   [id number]  :   1995  ACCT. NUMBER: [id number]  DATE OF SERVICE: 3/16/20  START TIME:  3:00 PM  END TIME:  3:50 PM  FACILITATOR: Kindra Cortez LICSW  TOPIC: MH EBP Group: Relationship Skills  Adult Mental Health Day Treatment  TRACK: 3B    NUMBER OF PARTICIPANTS: 4    Summary of Group / Topics Discussed:  Relationship Skills: Relationship Mapping: Patients identified different types of relationships they have in their life and examined if there is conflict or closeness, the degree of conflict or closeness, and the reason for conflict. The goal of this topic is to help patients gain awareness of the relationships they have with others, identify types of conflict in patients  lives and how they impact symptoms/functioning, and identify how they can improve relationships with relationship and interpersonal skills they have learned.     Patient Session Goals / Objectives:    Familiarized patients with awareness to the different types of relationships they may have with different people and substances in their lives    Discussed and practiced strategies to promote healthier understanding of interpersonal relationships with a focus on awareness of conflict, the causes of conflict, and the ways to transform conflict    Discussed the use of substances and its impact on their relationships      Patient Participation / Response:  Fully participated with the group by sharing personal reflections / insights and openly received / provided feedback with other participants.    Demonstrated understanding of topics discussed through group discussion and participation, Demonstrated understanding of relationship skills and communication skills and Verbalized understanding of communication skills, communication challenges, and communication strengths    Treatment Plan:  Patient has a current master individualized treatment plan.  See Epic treatment  plan for more information.    Kindra Cortez, LICSW

## 2020-03-17 NOTE — GROUP NOTE
"Process Group Note    PATIENT'S NAME: Daniel Thomas  MRN:   [id number]  :   1995  ACCT. NUMBER: [id number]  DATE OF SERVICE: 3/16/20  START TIME:  1:00 PM  END TIME:  1:50 PM  FACILITATOR: Chel Viramontes LPCC  TOPIC:  Process Group    Diagnoses:  Autism Spectrum Disorder 299.00(F84.0)  Associated with a known medical or genetic condition or environmental factor- per patient  296.33 (F33.2) Major Depressive Disorder, Recurrent Episode, Severe _ and With anxious distress  300.3 (F42) Obsessive Compulsive Disorder- per patient      Adult Mental Health Day Treatment  TRACK: 3B    NUMBER OF PARTICIPANTS: 4          Data:    Session content: At the start of this group, patients were invited to check in by identifying themselves, describing their current emotional status, and identifying issues to address in this group.   Area(s) of treatment focus addressed in this session included Symptom Management, Personal Safety and Community Resources/Discharge Planning.    Patient reported feeling \"pretty good\" after starting a new medication on Saturday. Patient discussed working toward stopping at the store for paper towels and feeling stressed that stores are out of soap. Patient identified\"trying to get out\" and attending to personal needs as skills they will use to address their goal(s). Patient reported isolating and \"social distancing\" may be a barrier to working toward their goal(s) and/or addressing mental health symptoms. Patient reported no safety concerns and/or self-injurious behaviors. Patient reported yes substance use [Friday and Saturday night while out \"at a bar\"]. Patient reported they are taking their medications as prescribed. Patient reported feeling proud that they went out this weekend and socialized. Patient discussed adjusting to his new medication and staying awake for a long time and feeling tired  after being awake \"a lot\" on Saturday night with the treatment group. " "    Therapeutic Interventions/Treatment Strategies:  Psychotherapist offered support, feedback and validation, provided redirection and reinforced use of skills. Treatment modalities used include Motivational Interviewing and Cognitive Behavioral Therapy. Interventions include Coping Skills: Discussed use of self-soothe skills to decrease distress in the body and Discussed how the use of intentional \"in the moment\" actions can help reduce distress and Mindfulness: Facilitated discussion of when/how to use mindfulness skills to benefit general health, mental health symptoms, and stressors.    Assessment:    Patient response:   Patient responded to session by accepting feedback, giving feedback, listening, focusing on goals, being attentive and accepting support    Possible barriers to participation / learning include: and no barriers identified    Health Issues:   None reported       Substance Use Review:   Substance Use: Last use: Friday and Saturday \"out at a bar\"    Mental Status/Behavioral Observations  Appearance:   Appropriate   Eye Contact:   Fair   Psychomotor Behavior: Normal   Attitude:   Cooperative   Orientation:   All  Speech   Rate / Production: Normal    Volume:  Loud   Mood:    Anxious  Normal  Affect:    Appropriate   Thought Content:   Clear  Thought Form:  Coherent  Logical     Insight:    Fair     Plan:     Safety Plan: No current safety concerns identified.  Recommended that patient call 911 or go to the local ED should there be a change in any of these risk factors.     Barriers to treatment: None identified    Patient Contracts (see media tab):  None    Substance Use: Reviewed information and resources for treatment and ongoing sobriety    Provided encouragement towards sobriety     Continue or Discharge: Patient will continue in Adult Day Treatment (ADT)  as planned. Patient is likely to benefit from learning and using skills as they work toward the goals identified in their treatment " plan.      Chel Viramontes, [facility]  March 17, 2020

## 2020-03-18 ENCOUNTER — HOSPITAL ENCOUNTER (OUTPATIENT)
Dept: BEHAVIORAL HEALTH | Facility: CLINIC | Age: 25
End: 2020-03-18
Attending: PSYCHIATRY & NEUROLOGY
Payer: COMMERCIAL

## 2020-03-19 ENCOUNTER — TELEPHONE (OUTPATIENT)
Dept: BEHAVIORAL HEALTH | Facility: CLINIC | Age: 25
End: 2020-03-19

## 2020-03-19 NOTE — TELEPHONE ENCOUNTER
"Daniel Thomas is a 24 year old male who is being evaluated via a billable telephone visit.      The patient has been notified of following:     \"This telephone visit will be conducted via a call between you and your clinican. We have found that certain health care needs can be provided without the need for a physical exam.  This service lets us provide the care you need with a phone conversation.\"     D.  This writer spoke with patient assess safety and any presenting concerns  I. The discussion addressed coordinating care with community providers and recommendations.  A. Patient reports following up regarding medication to address concerns and still needing to call and reschedule intimal intake for therapy services. Patient report no thoughts of suicide today and addressing concerns with supervisor today and feeling better. Patient appeared to express themes of low self esteem,, making derogatory comments about self related to a work (equipment failure). This writer addresses with patient maladaptive thinking, and alternative ways of looking at the situation. Patient reports getting a hour more sleep than usual and agrees to seek support as needed. Patient is given info for the new therapist that will be taking over the group, and informed that he will receive call form other team members next week. This writer discussed skills with patient and review patient support coping, safety plan  P. This writer will follow up with patient tomorrow for a brief check in.    Assessment/Plan:  Diagnoses:      Autism Spectrum Disorder 299.00(F84.0)  Associated with a known medical or genetic condition or environmental factor- per patient  296.33 (F33.2) Major Depressive Disorder, Recurrent Episode, Severe _ and With anxious distress  300.3 (F42) Obsessive Compulsive Disorder- per patient             I have reviewed the note as documented above.  This accurately captures the substance of my conversation with the " patient.      Phone call contact time  Call Started at 3:30 PM  Call Ended at 3:45 PM    EMILY Alvarado, KEELEYSW

## 2020-03-20 ENCOUNTER — TELEPHONE (OUTPATIENT)
Dept: BEHAVIORAL HEALTH | Facility: CLINIC | Age: 25
End: 2020-03-20

## 2020-03-20 ENCOUNTER — HOSPITAL ENCOUNTER (OUTPATIENT)
Dept: BEHAVIORAL HEALTH | Facility: CLINIC | Age: 25
End: 2020-03-20
Attending: PSYCHIATRY & NEUROLOGY
Payer: COMMERCIAL

## 2020-03-20 NOTE — TELEPHONE ENCOUNTER
This writer spoke with patient regarding following up withy providers including rescheduling therapy appointment with [facility], and addressing medication concerns with provider. Patient denies suicidal thoughts, and reports plans to follow up. Patient reported 2 months ago feeling suicidal and thinking about a plan (hiding his body/car) but hasn't not acted on the plan and denies having a place picked out. This writer reviewed safety plan and patient agree to seek higher level of care and reach out to mom if nit able to maintain boundaries of safety or researching a place to hide body. Patient denies suicidal thoughts, is future oriented and gives this writer permission to follow up with parent. This writer spoke with parent via phone and discussed safety, supervision, monitoring which parent agrees. Parent reports being aware of patient history with suicidal thoughts and having no concerns at this time. This writerer called [facility] and rescheduled. Rolanda Chairez on Thursday March 26, at 3:30 for therapy in [facility]

## 2020-03-20 NOTE — TELEPHONE ENCOUNTER
This writer called and informed parent of patient therapy appointment scheduled on Thursday March 26 in [facility] at 3:30

## 2020-03-23 NOTE — TELEPHONE ENCOUNTER
Writer spoke with Pt to check in about sleep, general wellness, and safety. Pt reported hitting his head at work earlier in the day and has a large bump. Writer discussed Sx of concussion, and it doesn't sound like he has those Sx, but writer recommended getting checked out if he felt more comfortable doing so.     Pt mentioned he's been able to sleep better the last few days and hopes to get his body on a better sleeps schedule over the weekend. He also feels waves of being emotionally stressed out and also experiencing increased SI. He will cry for periods of time or try to sleep. He feels like some types of music worsen these Sx, so he avoids that. He says he has a plan for suicide but rarely has the urges to act on it. He didn't disclose the details of the plan. He said he could maintain safety this weekend. Pt presented with self-deprecating comments, and writer discussed self-compassion and tried to help put things into perspective with the stresses of work, MH, COVID19, etc. Which he was receptive to.     He said that he was told by someone at work that a coworker he had rare contact with may have been exposed to COVID19. Writer discussed these concerns and gave him the information about Sx to watch for with himself.     Call was from [phone].

## 2020-03-24 NOTE — TELEPHONE ENCOUNTER
3/24/2020    Writer contacted patient to set up schedule for ADT sessions via telephone. Patient expresses interest in continued work on developing lifestyle balance/structure/routine, improved participation, as well as learn, practice, apply distress tolerance skills to his current stressors. Writer will plan on contacting patient on Thursdays from 4:00-4:30 1x per week until context changes or patient discharges.    Dale Whitley, OT

## 2020-03-26 ENCOUNTER — VIRTUAL VISIT (OUTPATIENT)
Dept: BEHAVIORAL HEALTH | Facility: CLINIC | Age: 25
End: 2020-03-26
Payer: COMMERCIAL

## 2020-03-26 DIAGNOSIS — F41.9 ANXIETY: ICD-10-CM

## 2020-03-26 DIAGNOSIS — F33.2 MAJOR DEPRESSIVE DISORDER, RECURRENT EPISODE, SEVERE WITH ANXIOUS DISTRESS (H): ICD-10-CM

## 2020-03-26 DIAGNOSIS — F42.9 OCD (OBSESSIVE COMPULSIVE DISORDER): Primary | ICD-10-CM

## 2020-03-26 DIAGNOSIS — F90.9 ADHD (ATTENTION DEFICIT HYPERACTIVITY DISORDER): ICD-10-CM

## 2020-03-26 PROCEDURE — 90834 PSYTX W PT 45 MINUTES: CPT | Mod: TEL | Performed by: SOCIAL WORKER

## 2020-03-26 NOTE — PROGRESS NOTES
"Daniel Thomas is a 24 year old male who is being evaluated via a telephone visit.      The patient has been notified of the following:     \"We have found that certain health care needs can be provided without the need for a face to face visit.  This service lets us provide the care you need with a short phone conversation.      I will have full access to your [place] medical record during this entire phone call.   I will be taking notes for your medical record.     Since this is like an office visit, we will bill your insurance company for this service.  Please check with your medical insurance if this type of telephone visit/virtual care is covered.  You may be responsible for the cost of this service if insurance coverage is denied.      There are potential benefits and risks of telephone visits (e.g. limits to patient confidentiality) that differ from in-person visits.?  Confidentiality still applies for telephone services, and nobody will record the visit.  It is important to be in a quiet, private space that is free of distractions (including cell phone or other devices) during the visit.??     If during the course of the call I believe a telephone visit is not appropriate, you will not be charged for this service\"    Consent has been obtained for this service by care team member: yes      March 26, 2020      Behavioral Health Clinician Progress Note    Patient Name: Daniel Thomas           Service Type: Phone Visit      Service Location:        Session Start Time: 340 pm  Session End Time: 420 pm      Session Length: 38 - 52      Attendees: Patient    Visit Activities (Refresh list every visit): NEW and [facility] Only    Diagnostic Assessment Date: 2/11/2020 by Yessica Garcia [facility]  Treatment Plan Review Date: not completed  See Flowsheets for today's PHQ-9 and BILLY-7 results  Previous PHQ-9:   PHQ-9 SCORE 10/30/2019 1/28/2020 2/17/2020   PHQ-9 Total Score - - -   PHQ-9 Total Score 15 " 17 21     Previous BILLY-7:   BILLY-7 SCORE 10/30/2019 1/28/2020 2/17/2020   Total Score 13 18 15       HUSSAIN LEVEL:  No flowsheet data found.    DATA  Extended Session (60+ minutes): No  Interactive Complexity: No  Crisis: No    Treatment Objective(s) Addressed in This Session:  Target Behavior(s): disease management/lifestyle changes decrease obsessive handwashing    Depressed Mood: Increase interest, engagement, and pleasure in doing things  Decrease frequency and intensity of feeling down, depressed, hopeless  Improve quantity and quality of night time sleep / decrease daytime naps  Feel less tired and more energy during the day   Identify negative self-talk and behaviors: challenge core beliefs, myths, and actions  Improve concentration, focus, and mindfulness in daily activities   Feel less fidgety, restless or slow in daily activities / interpersonal interactions  Decrease thoughts that you'd be better off dead or of suicide / self-harm  Anxiety: will experience a reduction in anxiety, will develop more effective coping skills to manage anxiety symptoms, will develop healthy cognitive patterns and beliefs and will increase ability to function adaptively  Relationship Problems: will address relationship difficulties in a more adaptive manner  Functional Impairment: will effectively address problems that interfere with adaptive functioning  Risk / Safety: will follow safety plan (in EMR) for more effective management of risk issues  Attention Problems: will develop coping skills to effectively manage attention issues    Current Stressors / Issues:  First session with patient. He is referred by [facility]. He is interested in pursuing therapy as he would like to focus on issues regarding his obsessive thoughts and behavior.  Discussed referral for long term therapist Wallace Obregon. Pt is in agreement with this plan. He will see this writer until this is in place. Patient reports talking about  "past issues that led to obsessions \"makes things worse\".  Discussed coping behaviors to use after this appt. He reported he would try to practice. Return in one week.     Progress on Treatment Objective(s) / Homework:  none established    Motivational Interviewing    MI Intervention: Expressed Empathy/Understanding, Supported Autonomy, Collaboration, Evocation, Permission to raise concern or advise, Open-ended questions, Reflections: simple and complex, Change talk (evoked) and Reframe     Change Talk Expressed by the Patient: Desire to change Reasons to change Need to change    Provider Response to Change Talk: E - Evoked more info from patient about behavior change, A - Affirmed patient's thoughts, decisions, or attempts at behavior change, R - Reflected patient's change talk and S - Summarized patient's change talk statements      Care Plan review completed: No    Medication Review:  No changes to current psychiatric medication(s)    Medication Compliance:  Yes    Changes in Health Issues:   None reported    Chemical Use Review:   Substance Use: Chemical use reviewed, no active concerns identified      Tobacco Use: No current tobacco use.  Patient vapes daily    Assessment: Current Emotional / Mental Status (status of significant symptoms):  Risk status (Self / Other harm or suicidal ideation)  Patient has had a history of suicidal ideation: on and off since age 10, suicide attempts: overdose in middle school and self-injurious behavior: urges  Patient denies current fears or concerns for personal safety.  Patient reports the following current or recent suicidal ideation or behaviors: ideation.  Patient denies current or recent homicidal ideation or behaviors.  Patient denies current or recent self injurious behavior or ideation.  Patient denies other safety concerns.  A safety and risk management plan has been developed including: discussed safety plan developed in Day Treatment. Patient has it on his " refridgerator    Appearance:   NA   Eye Contact:   NA   Psychomotor Behavior: NA   Attitude:   Cooperative   Orientation:   All  Speech   Rate / Production: Normal    Volume:  Normal   Mood:    Anxious  Normal  Affect:    NA   Thought Content:  Clear   Thought Form:  Coherent  Logical   Insight:    Good     Diagnoses:  1. Major depressive disorder, recurrent episode, severe with anxious distress (H)    2. Anxiety    3. OCD (obsessive compulsive disorder)    4. ADHD (attention deficit hyperactivity disorder)        Collateral Reports Completed:  Communicated with: PCP and Day Treatment providers as needed    Plan: (Homework, other):  Patient was given information about behavioral services and encouraged to schedule a follow up appointment with the clinic [facility] in 1 week.  He was also given information about mental health symptoms and treatment options  and Distress tolerance skills to use with anxiety.  CD Recommendations: No indications of CD issues.  SHASHANK Levin (Mindy),[facility]

## 2020-03-27 ENCOUNTER — TELEPHONE (OUTPATIENT)
Dept: BEHAVIORAL HEALTH | Facility: CLINIC | Age: 25
End: 2020-03-27

## 2020-03-30 ENCOUNTER — TELEPHONE (OUTPATIENT)
Dept: BEHAVIORAL HEALTH | Facility: CLINIC | Age: 25
End: 2020-03-30

## 2020-03-30 ENCOUNTER — HOSPITAL ENCOUNTER (OUTPATIENT)
Dept: BEHAVIORAL HEALTH | Facility: CLINIC | Age: 25
End: 2020-03-30
Attending: PSYCHIATRY & NEUROLOGY
Payer: COMMERCIAL

## 2020-03-30 PROCEDURE — 90832 PSYTX W PT 30 MINUTES: CPT | Mod: TEL

## 2020-03-30 NOTE — PROGRESS NOTES
"Adult Mental Health Day Treatment  TRACK: 3B    PATIENT'S NAME: Daniel Thomas  MRN:   [id number]  :   1995  ACCT. NUMBER: [id number]  DATE OF SERVICE: 3/30/20  START TIME: 1605  END TIME: 1635    Daniel Thomas is a 24 year old male who is being evaluated via a telephone visit.      The patient has been notified of the following:     \"We have found that certain health care needs can be provided without the need for a face to face visit.  This service lets us provide the care you need with a short phone conversation.      I will have full access to your [place] medical record during this entire phone call.   I will be taking notes for your medical record.     Since this is like an office visit, we will bill your insurance company for this service.      There are potential benefits and risks of telephone visits (e.g. limits to patient confidentiality) that differ from in-person visits. Confidentiality still applies for telephone services, and nobody will record the visit.  It is important to be in a quiet, private space that is free of distractions (including cell phone or other devices) during the visit.     If during the course of the call I believe a telephone visit is not appropriate, you will not be charged for this service\"    Consent has been obtained for this service by care team member: yes        NUMBER OF PARTICIPANTS: 1    Diagnoses:  Diagnoses:      Autism Spectrum Disorder 299.00(F84.0)  Associated with a known medical or genetic condition or environmental factor- per patient  296.33 (F33.2) Major Depressive Disorder, Recurrent Episode, Severe _ and With anxious distress  300.3 (F42) Obsessive Compulsive Disorder- per patient              Data:    Session content: At the start, patient was invited to check in by describing their current emotional status, and identifying issues to address.   Area(s) of treatment focus addressed in this session included Symptom Management and Personal " Safety.    Patient reports feeling ok today. He reports feeling down, at one point in the day, but then got distracted and forgot about his worry. Patient also identifies having a panic attack this weekend when he realized that it was snowing and that he might get called into work in the middle of the night. This caused some sleep interruption. Patient has been using some of the sleep hygiene techniques that we have discussed and overall his sleep is better. 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 We discussed that this will be short term. Patient then talks about feeling good about getting this license and that this will allow him more job opportunities. He reports some increased hand washing with the COVID 19 situation. Daniel agrees to come up with 8 distractions that he can use when feeling anxious. He states that he has already discovered that he enjoys cooking. No safety concerns. We will talk again on 4/1/20          Therapeutic Interventions/Treatment Strategies:  Psychotherapist offered support, feedback and validation and reinforced use of skills. Treatment modalities used include Cognitive Behavioral Therapy. Interventions include Cognitive Restructuring:  Explored impact of ineffective thoughts / distortions on mood and activity and Coping Skills: Assisted patient in identifying 1-2 healthy distraction skills to reduce overall distress.    Assessment:    Patient response:   Patient responded to session by accepting feedback, giving feedback, listening, focusing on goals, being attentive and accepting support    Possible barriers to participation / learning include: and no barriers identified    Health Issues:   None reported       Substance Use Review:   Substance Use: No active concerns identified.    Mental Status/Behavioral Observations  Appearance:   NA   Eye Contact:   NA   Psychomotor Behavior: Normal   Attitude:   Cooperative    Orientation:   All  Speech   Rate / Production: Normal/ Responsive Normal    Volume:  Normal   Mood:    Anxious   Affect:    Appropriate   Thought Content:   Clear  Thought Form:  Coherent  Logical     Insight:    Good     Plan:     Safety Plan: Committed to safety and agreed to follow previously developed safety coping plan.      Barriers to treatment: None identified    Patient Contracts (see media tab):  None    Substance Use: Not addressed in session     Continue or Discharge: Patient will continue in Adult Day Treatment (ADT)  as planned. Patient is likely to benefit from learning and using skills as they work toward the goals identified in their treatment plan.      Reba Moscoso, LICSW  March 30, 2020

## 2020-04-01 ENCOUNTER — TELEPHONE (OUTPATIENT)
Dept: BEHAVIORAL HEALTH | Facility: CLINIC | Age: 25
End: 2020-04-01

## 2020-04-01 NOTE — TELEPHONE ENCOUNTER
Return call from Daniel. He missed my earlier call. He said he had a somewhat stressful day, as he transferred to a new work location. We scheduled a video visit for tomorrow at 3:30 pm.

## 2020-04-02 ENCOUNTER — HOSPITAL ENCOUNTER (OUTPATIENT)
Dept: BEHAVIORAL HEALTH | Facility: CLINIC | Age: 25
End: 2020-04-02
Attending: PSYCHIATRY & NEUROLOGY
Payer: COMMERCIAL

## 2020-04-02 ENCOUNTER — VIRTUAL VISIT (OUTPATIENT)
Dept: BEHAVIORAL HEALTH | Facility: CLINIC | Age: 25
End: 2020-04-02
Payer: COMMERCIAL

## 2020-04-02 DIAGNOSIS — F42.9 OCD (OBSESSIVE COMPULSIVE DISORDER): ICD-10-CM

## 2020-04-02 DIAGNOSIS — F41.9 ANXIETY: ICD-10-CM

## 2020-04-02 DIAGNOSIS — F33.2 MAJOR DEPRESSIVE DISORDER, RECURRENT EPISODE, SEVERE WITH ANXIOUS DISTRESS (H): Primary | ICD-10-CM

## 2020-04-02 PROCEDURE — 90832 PSYTX W PT 30 MINUTES: CPT | Mod: TEL | Performed by: SOCIAL WORKER

## 2020-04-02 PROCEDURE — 90832 PSYTX W PT 30 MINUTES: CPT | Mod: GT

## 2020-04-02 NOTE — PROGRESS NOTES
Adult Mental Health Day Treatment  TRACK: 3B    PATIENT'S NAME: Daniel Thomas  MRN:   [id number]  :   1995  ACCT. NUMBER: [id number]  DATE OF SERVICE: 20  START TIME: 1550  END TIME: 1620  Telemedicine Visit: The patient's condition can be safely assessed and treated via synchronous audio and visual telemedicine encounter.      Reason for Telemedicine Visit: Services only offered telehealth    Originating Site (Patient Location): Patient's home    Distant Site (Provider Location): [facility] [place], [place]    Consent:  The patient/guardian has verbally consented to: the potential risks and benefits of telemedicine (video visit) versus in person care; bill my insurance or make self-payment for services provided; and responsibility for payment of non-covered services.     Mode of Communication:  Video Conference via [employer]    As the provider I attest to compliance with applicable laws and regulations related to telemedicine.      NUMBER OF PARTICIPANTS: 1    Diagnoses:  Diagnoses:      Autism Spectrum Disorder 299.00(F84.0)  Associated with a known medical or genetic condition or environmental factor- per patient  296.33 (F33.2) Major Depressive Disorder, Recurrent Episode, Severe _ and With anxious distress  300.3 (F42) Obsessive Compulsive Disorder- per patient              Data:    Session content:   Area(s) of treatment focus addressed in this session included Symptom Management and Personal Safety.  Daniel reports feeling ok today, despite not sleeping well last night. He reports being almost asleep when he questioned whether he set his alarm and then became anxious. We discussed the importance of a wind down routine. He reports a good day at work, especially since he had a lot of very physical duties. He identifies feeling less anxious, when he is active physically. 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 He did follow through on making a list of  distractions to use when he is feeling anxious. We will talk tomorrow to see if his computer will work for group telehealth next week. No safety concerns.    Therapeutic Interventions/Treatment Strategies:  Psychotherapist offered support, feedback and validation and reinforced use of skills. Treatment modalities used include Cognitive Behavioral Therapy. Interventions include Cognitive Restructuring:  Explored impact of ineffective thoughts / distortions on mood and activity.    Assessment:    Patient response:   Patient responded to session by accepting feedback, giving feedback, listening, focusing on goals, being attentive and accepting support    Possible barriers to participation / learning include: and no barriers identified    Health Issues:   None reported       Substance Use Review:   Substance Use: No active concerns identified.    Mental Status/Behavioral Observations  Appearance:   Appropriate   Eye Contact:   Good   Psychomotor Behavior: Normal   Attitude:   Cooperative   Orientation:   All  Speech   Rate / Production: Normal    Volume:  Normal   Mood:    Anxious   Affect:    Appropriate   Thought Content:   Clear  Thought Form:  Coherent  Logical     Insight:    Fair     Plan:     Safety Plan: Committed to safety and agreed to follow previously developed safety coping plan.      Barriers to treatment: None identified    Patient Contracts (see media tab):  None    Substance Use: Not addressed in session     Continue or Discharge: Patient will continue in Adult Day Treatment (ADT)  as planned. Patient is likely to benefit from learning and using skills as they work toward the goals identified in their treatment plan.      Reba Moscoso, [facility] Light Blue [facility] HospitalSW  April 2, 2020

## 2020-04-02 NOTE — PROGRESS NOTES
"Daniel Thomas is a 24 year old male who is being evaluated via a telephone visit.      The patient has been notified of the following:     \"We have found that certain health care needs can be provided without the need for a face to face visit.  This service lets us provide the care you need with a short phone conversation.      I will have full access to your [place] medical record during this entire phone call.   I will be taking notes for your medical record.     Since this is like an office visit, we will bill your insurance company for this service.  Please check with your medical insurance if this type of telephone visit/virtual care is covered.  You may be responsible for the cost of this service if insurance coverage is denied.      There are potential benefits and risks of telephone visits (e.g. limits to patient confidentiality) that differ from in-person visits.?  Confidentiality still applies for telephone services, and nobody will record the visit.  It is important to be in a quiet, private space that is free of distractions (including cell phone or other devices) during the visit.??     If during the course of the call I believe a telephone visit is not appropriate, you will not be charged for this service\"    Consent has been obtained for this service by care team member: yes      April 2, 2020      Behavioral Health Clinician Progress Note    Patient Name: Daniel Thomas           Service Type: Phone Visit      Service Location:        Session Start Time: 330 pm  Session End Time: 350 pm      Session Length: 16 - 37      Attendees: Patient    Visit Activities (Refresh list every visit): [facility] Only    Diagnostic Assessment Date: 2/11/2020 by Yessica Garcia [facility]  Treatment Plan Review Date: not completed  See Flowsheets for today's PHQ-9 and BILLY-7 results  Previous PHQ-9:   PHQ-9 SCORE 10/30/2019 1/28/2020 2/17/2020   PHQ-9 Total Score - - -   PHQ-9 Total Score 15 17 21 " "    Previous BILLY-7:   BILLY-7 SCORE 10/30/2019 1/28/2020 2/17/2020   Total Score 13 18 15       HUSSAIN LEVEL:  No flowsheet data found.    DATA  Extended Session (60+ minutes): No  Interactive Complexity: No  Crisis: No    Treatment Objective(s) Addressed in This Session:  Target Behavior(s): disease management/lifestyle changes decrease obsessive handwashing    Depressed Mood: Increase interest, engagement, and pleasure in doing things  Decrease frequency and intensity of feeling down, depressed, hopeless  Improve quantity and quality of night time sleep / decrease daytime naps  Feel less tired and more energy during the day   Identify negative self-talk and behaviors: challenge core beliefs, myths, and actions  Improve concentration, focus, and mindfulness in daily activities   Feel less fidgety, restless or slow in daily activities / interpersonal interactions  Decrease thoughts that you'd be better off dead or of suicide / self-harm  Anxiety: will experience a reduction in anxiety, will develop more effective coping skills to manage anxiety symptoms, will develop healthy cognitive patterns and beliefs and will increase ability to function adaptively  Relationship Problems: will address relationship difficulties in a more adaptive manner  Functional Impairment: will effectively address problems that interfere with adaptive functioning  Risk / Safety: will follow safety plan (in EMR) for more effective management of risk issues  Attention Problems: will develop coping skills to effectively manage attention issues    Current Stressors / Issues:    Patient reports he is \"doing good\" today. Reviewed  referral for long term therapist Wallace Obregon - patient will call and set up appt.  He will see this writer until this is in place.  Discussed and reviewed coping behaviors. Return in one week.     Progress on Treatment Objective(s) / Homework:  Satisfactory progress - ACTION (Actively working towards change); Intervened by " reinforcing change plan / affirming steps taken    Motivational Interviewing    MI Intervention: Expressed Empathy/Understanding, Supported Autonomy, Collaboration, Evocation, Permission to raise concern or advise, Open-ended questions, Reflections: simple and complex, Change talk (evoked) and Reframe     Change Talk Expressed by the Patient: Desire to change Reasons to change Need to change    Provider Response to Change Talk: E - Evoked more info from patient about behavior change, A - Affirmed patient's thoughts, decisions, or attempts at behavior change, R - Reflected patient's change talk and S - Summarized patient's change talk statements      Care Plan review completed: No    Medication Review:  No changes to current psychiatric medication(s)    Medication Compliance:  Yes    Changes in Health Issues:   None reported    Chemical Use Review:   Substance Use: Chemical use reviewed, no active concerns identified      Tobacco Use: No current tobacco use.  Patient vapes daily    Assessment: Current Emotional / Mental Status (status of significant symptoms):  Risk status (Self / Other harm or suicidal ideation)  Patient has had a history of suicidal ideation: on and off since age 10, suicide attempts: overdose in middle school and self-injurious behavior: urges  Patient denies current fears or concerns for personal safety.  Patient reports the following current or recent suicidal ideation or behaviors: ideation.  Patient denies current or recent homicidal ideation or behaviors.  Patient denies current or recent self injurious behavior or ideation.  Patient denies other safety concerns.  A safety and risk management plan has been developed including: discussed safety plan developed in Day Treatment. Patient has it on his refridgerator    Appearance:   NA   Eye Contact:   NA   Psychomotor Behavior: NA   Attitude:   Cooperative   Orientation:   All  Speech   Rate / Production: Normal    Volume:  Normal    Mood:    Normal  Affect:    NA   Thought Content:  Clear   Thought Form:  Coherent  Logical   Insight:    Good     Diagnoses:  1. Major depressive disorder, recurrent episode, severe with anxious distress (H)    2. Anxiety    3. OCD (obsessive compulsive disorder)        Collateral Reports Completed:  Communicated with: PCP and Day Treatment providers as needed    Plan: (Homework, other):  Patient was given information about behavioral services and encouraged to schedule a follow up appointment with the clinic [facility] in 1 week.  He was also given information about mental health symptoms and treatment options  and Distress tolerance skills to use with anxiety.  CD Recommendations: No indications of CD issues.  SHASHANK Levin (Mindy),[facility]

## 2020-04-03 ENCOUNTER — TELEPHONE (OUTPATIENT)
Dept: BEHAVIORAL HEALTH | Facility: CLINIC | Age: 25
End: 2020-04-03

## 2020-04-06 ENCOUNTER — HOSPITAL ENCOUNTER (OUTPATIENT)
Dept: BEHAVIORAL HEALTH | Facility: CLINIC | Age: 25
End: 2020-04-06
Attending: PSYCHIATRY & NEUROLOGY
Payer: COMMERCIAL

## 2020-04-06 ENCOUNTER — TELEPHONE (OUTPATIENT)
Dept: FAMILY MEDICINE | Facility: CLINIC | Age: 25
End: 2020-04-06

## 2020-04-06 PROCEDURE — G0177 OPPS/PHP; TRAIN & EDUC SERV: HCPCS | Mod: GT

## 2020-04-06 PROCEDURE — 90853 GROUP PSYCHOTHERAPY: CPT | Mod: GT

## 2020-04-06 NOTE — GROUP NOTE
Psychotherapy Group Note    PATIENT'S NAME: Daniel Thomas  MRN:   [id number]  :   1995  ACCT. NUMBER: [id number]  DATE OF SERVICE: 20  START TIME:  3:00 PM  END TIME:  3:50 PM  FACILITATOR: Reba Irby LICSW  TOPIC: MH EBP Group: Relationship Skills  Adult Mental Health Day Treatment  TRACK: 3B    NUMBER OF PARTICIPANTS: 4    Summary of Group / Topics Discussed:  Relationship Skills: Assertive Communication: Patients were provided with a general overview of assertive communication skills and how practicing assertive communication skills will assist patients in developing healthier and more effective relationships. Patients reviewed their current awareness on ability to practice assertive communication, ways to increase assertive communication, and identified/problem solved barriers to assertive communication.     Patient Session Goals / Objectives:    Identified and discussed patient individual challenges with communication    Presented and practiced effective communication skills in session    Assisted patients in implementing more effective communication skills in their relationships      Patient Participation / Response:  Fully participated with the group by sharing personal reflections / insights and openly received / provided feedback with other participants.    Demonstrated understanding of topics discussed through group discussion and participation    Treatment Plan:  Patient has a current master individualized treatment plan.  See Epic treatment plan for more information.    SHASHANK Harman

## 2020-04-06 NOTE — GROUP NOTE
Psychoeducation Group Note    PATIENT'S NAME: Daniel Thomas  MRN:   [id number]  :   1995  ACCT. NUMBER: [id number]  DATE OF SERVICE: 20  START TIME:  2:00 PM  END TIME:  2:50 PM  FACILITATOR: Vivien Bueno RN  TOPIC: TWIN RN Group: Mental Health Maintenance  Telemedicine Visit: The patient's condition can be safely assessed and treated via synchronous audio and visual telemedicine encounter.      Reason for Telemedicine Visit:  COVID19    Originating Site (Patient Location): Patient's home    Distant Site (Provider Location): [facility]: [facility]    Consent:  The patient/guardian has verbally consented to: the potential risks and benefits of telemedicine (video visit) versus in person care; bill my insurance or make self-payment for services provided; and responsibility for payment of non-covered services.     Mode of Communication:  Video Conference via [employer]    As the provider I attest to compliance with applicable laws and regulations related to telemedicine.     Adult Mental Health Day Treatment  TRACK: 3B    NUMBER OF PARTICIPANTS: 4    Summary of Group / Topics Discussed:  Mental Health Maintenance:  Vulnerability: In this group, the concept of vulnerability was explored through the viewing, discussion, and self-reflection of the Luis Dewitt Talk Titled,  The Power of Vulnerability.      Patient Session Goals / Objectives:  ? Defined and described definition of vulnerability   ? Identified 2 or more ways of practicing authenticity         Patient Participation / Response:  Moderately participated, sharing some personal reflections / insights and adequately adequately received / provided feedback with other participants.    Demonstrated understanding of topics discussed through group discussion and participation and Identified / Expressed personal readiness to practice skills    Treatment Plan:  Patient has a current master individualized treatment plan.  See Epic  treatment plan for more information.    Vivien Bueno RN

## 2020-04-07 ENCOUNTER — VIRTUAL VISIT (OUTPATIENT)
Dept: FAMILY MEDICINE | Facility: CLINIC | Age: 25
End: 2020-04-07
Payer: COMMERCIAL

## 2020-04-07 DIAGNOSIS — F90.2 ATTENTION DEFICIT HYPERACTIVITY DISORDER (ADHD), COMBINED TYPE: ICD-10-CM

## 2020-04-07 DIAGNOSIS — F41.9 ANXIETY: Primary | ICD-10-CM

## 2020-04-07 PROCEDURE — 99214 OFFICE O/P EST MOD 30 MIN: CPT | Mod: TEL | Performed by: FAMILY MEDICINE

## 2020-04-07 RX ORDER — DULOXETIN HYDROCHLORIDE 30 MG/1
CAPSULE, DELAYED RELEASE ORAL
Qty: 60 CAPSULE | Refills: 1 | Status: SHIPPED | OUTPATIENT
Start: 2020-04-07 | End: 2020-05-29

## 2020-04-07 RX ORDER — DEXTROAMPHETAMINE SACCHARATE, AMPHETAMINE ASPARTATE, DEXTROAMPHETAMINE SULFATE AND AMPHETAMINE SULFATE 2.5; 2.5; 2.5; 2.5 MG/1; MG/1; MG/1; MG/1
10 TABLET ORAL 2 TIMES DAILY
Qty: 60 TABLET | Refills: 0 | Status: CANCELLED | OUTPATIENT
Start: 2020-04-07

## 2020-04-07 RX ORDER — DEXTROAMPHETAMINE SACCHARATE, AMPHETAMINE ASPARTATE, DEXTROAMPHETAMINE SULFATE AND AMPHETAMINE SULFATE 2.5; 2.5; 2.5; 2.5 MG/1; MG/1; MG/1; MG/1
10 TABLET ORAL 2 TIMES DAILY
Qty: 60 TABLET | Refills: 0 | Status: SHIPPED | OUTPATIENT
Start: 2020-06-08 | End: 2020-07-08

## 2020-04-07 RX ORDER — DEXTROAMPHETAMINE SACCHARATE, AMPHETAMINE ASPARTATE, DEXTROAMPHETAMINE SULFATE AND AMPHETAMINE SULFATE 2.5; 2.5; 2.5; 2.5 MG/1; MG/1; MG/1; MG/1
10 TABLET ORAL 2 TIMES DAILY
Qty: 60 TABLET | Refills: 0 | Status: SHIPPED | OUTPATIENT
Start: 2020-05-08 | End: 2020-06-07

## 2020-04-07 RX ORDER — DEXTROAMPHETAMINE SACCHARATE, AMPHETAMINE ASPARTATE, DEXTROAMPHETAMINE SULFATE AND AMPHETAMINE SULFATE 2.5; 2.5; 2.5; 2.5 MG/1; MG/1; MG/1; MG/1
10 TABLET ORAL 2 TIMES DAILY
Qty: 60 TABLET | Refills: 0 | Status: SHIPPED | OUTPATIENT
Start: 2020-04-07 | End: 2020-05-07

## 2020-04-07 ASSESSMENT — ANXIETY QUESTIONNAIRES
2. NOT BEING ABLE TO STOP OR CONTROL WORRYING: NEARLY EVERY DAY
3. WORRYING TOO MUCH ABOUT DIFFERENT THINGS: NEARLY EVERY DAY
5. BEING SO RESTLESS THAT IT IS HARD TO SIT STILL: NEARLY EVERY DAY
1. FEELING NERVOUS, ANXIOUS, OR ON EDGE: NEARLY EVERY DAY
IF YOU CHECKED OFF ANY PROBLEMS ON THIS QUESTIONNAIRE, HOW DIFFICULT HAVE THESE PROBLEMS MADE IT FOR YOU TO DO YOUR WORK, TAKE CARE OF THINGS AT HOME, OR GET ALONG WITH OTHER PEOPLE: VERY DIFFICULT
7. FEELING AFRAID AS IF SOMETHING AWFUL MIGHT HAPPEN: NEARLY EVERY DAY

## 2020-04-07 ASSESSMENT — PATIENT HEALTH QUESTIONNAIRE - PHQ9
SUM OF ALL RESPONSES TO PHQ QUESTIONS 1-9: 17
5. POOR APPETITE OR OVEREATING: NEARLY EVERY DAY

## 2020-04-07 NOTE — PATIENT INSTRUCTIONS
Plan to continue Adderall 10mg twice a day.    Start Cymbalta 30mg for 7 days then increase to 60mg (2 pills).  Recheck in clinic in 4-5 weeks.    Plan to put the suicide hotline number in phone.  Call and schedule therapy with Nereida again as you felt more comfortable and open with her.

## 2020-04-07 NOTE — PROGRESS NOTES
"Subjective     Daniel Thomas is a 24 year old male who is being evaluated via a billable telephone visit.      The patient has been notified of following:     \"This telephone visit will be conducted via a call between you and your physician/provider. We have found that certain health care needs can be provided without the need for a physical exam.  This service lets us provide the care you need with a short phone conversation.  If a prescription is necessary we can send it directly to your pharmacy.  If lab work is needed we can place an order for that and you can then stop by our lab to have the test done at a later time.    Telephone visits are billed at different rates depending on your insurance coverage. During this emergency period, for some insurers they may be billed the same as an in-person visit.  Please reach out to your insurance provider with any questions.    If during the course of the call the physician/provider feels a telephone visit is not appropriate, you will not be charged for this service.\"    Patient has given verbal consent for Telephone visit?  Yes    Daniel Thomas complains of   Chief Complaint   Patient presents with     Recheck Medication       ALLERGIES  Seasonal allergies    Depression and Anxiety Follow-Up    How are you doing with your depression since your last visit? Worsened. Patient states the prozac increase to 40mg increase anxiety ten fold (before starting the adderall) and he stopped taking the Prozac. Patient states his Depression and anxiety got worse. He states he didn't like the way he felt on it.    How are you doing with your anxiety since your last visit?  Worsened.    Are you having other symptoms that might be associated with depression or anxiety? No    Have you had a significant life event? No     Do you have any concerns with your use of alcohol or other drugs? No     Did therapy with Nereida who referred to Wallace Obergon and needing to schedule " this.  Therapy daily with outpatient group therapy, but feels more comfortable with individual therapy.    Started Cymbalta 2-3 years ago with Dr. Greenwood, helpful at first and then off the Adderall too since 2 years ago too. Zoloft, citalopram in past when younger but often forgot medication so didn't seem to help.     Started melatonin with nightmares.  Last time mom was over was 4 months ago to clean then came back over recently and very messy again.     No history of Lisa, no visual or auditory hallucinations.  Has OCD thinking that people who didn't wash hands so then patient needs to wash hands over, can delay getting to work on time sometimes.  No thoughts of paranoia.     Caffeine: 1-2 cans of pop every 1-2 days and energy drink once a week.  Alcohol: past 4 weeks no alcohol on a daily basis.  Past 1-2 drinks per day 16-12ounce Co[place].  Vapin nicotine   Drug/THC: None     Working for [place] [employer]      Social History     Tobacco Use     Smoking status: Former Smoker     Packs/day: 0.00     Types: Cigarettes     Last attempt to quit: 12/10/2017     Years since quittin.3     Smokeless tobacco: Never Used     Tobacco comment: patient vapes    Substance Use Topics     Alcohol use: Yes     Comment: socially      Drug use: No     PHQ 10/30/2019 2020 2020   PHQ-9 Total Score 15 17 21   Q9: Thoughts of better off dead/self-harm past 2 weeks Not at all Nearly every day More than half the days     BILLY-7 SCORE 10/30/2019 2020 2020   Total Score 13 18 15     Last PHQ-9 2020   1.  Little interest or pleasure in doing things 3   2.  Feeling down, depressed, or hopeless 1   3.  Trouble falling or staying asleep, or sleeping too much 3   4.  Feeling tired or having little energy 3   5.  Poor appetite or overeating 3   6.  Feeling bad about yourself 1   7.  Trouble concentrating 1   8.  Moving slowly or restless 1   Q9: Thoughts of better off dead/self-harm past 2 weeks 1   PHQ-9 Total Score 17  "  Difficulty at work, home, or with people Very difficult     BILLY-7  4/7/2020   1. Feeling nervous, anxious, or on edge 3   2. Not being able to stop or control worrying 3   3. Worrying too much about different things 3   4. Trouble relaxing 3   5. Being so restless that it is hard to sit still 3   6. Becoming easily annoyed or irritable -   7. Feeling afraid, as if something awful might happen 3   BILLY-7 Total Score -   If you checked any problems, how difficult have they made it for you to do your work, take care of things at home, or get along with other people? Very difficult   Has had some thoughts of self harm or killing self with a plan with \"crossing center line but only if the person on the other side crossed the center line first, but that opportunity hasn't presented itself\"  Reasons for living are for parents, doesn't want to burden them.  Does contract for safety and will plan to call to talk to Nereida.  Has hotline number on fridge. Recommended he put the number in his phone.      Suicide Assessment Five-step Evaluation and Treatment (SAFE-T)    Medication  Recheck- Adderall 10 MG  Taking as prescribed: Yes  Side effects: Yes- Patient gets fidgety and his eyes gets dry.  Helping symptoms: Yes    Adderall has been helpful for focus lately. Does have side effects of fidgety/restlessness and eye dryness.         How many servings of fruits and vegetables do you eat daily?  0-1    On average, how many sweetened beverages do you drink each day (Examples: soda, juice, sweet tea, etc.  Do NOT count diet or artificially sweetened beverages)?   0-1    How many days per week do you exercise enough to make your heart beat faster? 5    How many minutes a day do you exercise enough to make your heart beat faster? 20 - 29    How many days per week do you miss taking your medication? 0       Reviewed and updated as needed this visit by Provider         Review of Systems   ROS COMP: Constitutional, HEENT, " "cardiovascular, pulmonary, gi and gu systems are negative, except as otherwise noted.       Objective   Reported vitals:  There were no vitals taken for this visit.   healthy, alert and no distress  Psych: Alert and oriented times 3; coherent speech, normal   rate and volume, able to articulate logical thoughts, able   to abstract reason, no tangential thoughts, no hallucinations   or delusions  His affect is normal.  Mood \"okay\"     Diagnostic Test Results:  Labs reviewed in Epic        Assessment/Plan:  1. Attention deficit hyperactivity disorder (ADHD), combined type  Stable, this did not worsen anxiety symptoms. refill  - amphetamine-dextroamphetamine (ADDERALL) 10 MG tablet; Take 1 tablet (10 mg) by mouth 2 times daily  Dispense: 60 tablet; Refill: 0  - amphetamine-dextroamphetamine (ADDERALL) 10 MG tablet; Take 1 tablet (10 mg) by mouth 2 times daily  Dispense: 60 tablet; Refill: 0  - amphetamine-dextroamphetamine (ADDERALL) 10 MG tablet; Take 1 tablet (10 mg) by mouth 2 times daily  Dispense: 60 tablet; Refill: 0    2. Anxiety  Worsening anxiety, plan to start cymbalta  -discussed risks, benefits, and side effects of this new medication. Patient understands and is in agreement.  -schedule therapy with Nereida again as felt more comfortable with this than his daily outpatient therapy program.  Contracts for safety verbally.  - DULoxetine (CYMBALTA) 30 MG capsule; Take 1 capsule (30 mg) by mouth daily for 7 days, THEN 2 capsules (60 mg) daily.  Dispense: 60 capsule; Refill: 1    No follow-ups on file.      Phone call duration:  25 minutes    Nadeem Hernández MD    "

## 2020-04-07 NOTE — TELEPHONE ENCOUNTER
The [facility] clarification form routed to Dr. Hernández to hold for appt 4/7/20 at 3:40pm

## 2020-04-07 NOTE — ADDENDUM NOTE
Encounter addended by: Reba Irby LICSW on: 4/7/2020 11:30 AM   Actions taken: Clinical Note Signed, Charge Capture section accepted

## 2020-04-07 NOTE — GROUP NOTE
Process Group Note    PATIENT'S NAME: Daniel Thomas  MRN:   [id number]  :   1995  ACCT. NUMBER: [id number]  DATE OF SERVICE: 20  START TIME:  1:00 PM  END TIME:  1:50 PM  FACILITATOR: Reba Irby LICSW  TOPIC:  Process Group    Diagnoses:  Diagnoses:      Autism Spectrum Disorder 299.00(F84.0)  Associated with a known medical or genetic condition or environmental factor- per patient  296.33 (F33.2) Major Depressive Disorder, Recurrent Episode, Severe _ and With anxious distress  300.3 (F42) Obsessive Compulsive Disorder- per patient              Adult Mental Health Day Treatment  TRACK: 3B    NUMBER OF PARTICIPANTS: 4      Telemedicine Visit: The patient's condition can be safely assessed and treated via synchronous audio and visual telemedicine encounter.      Reason for Telemedicine Visit: Services only offered telehealth    Originating Site (Patient Location): Patient's home    Distant Site (Provider Location): [facility]: [facility] [place]    Consent:  The patient/guardian has verbally consented to: the potential risks and benefits of telemedicine (video visit) versus in person care; bill my insurance or make self-payment for services provided; and responsibility for payment of non-covered services.     Mode of Communication:  Video Conference via [place]    As the provider I attest to compliance with applicable laws and regulations related to telemedicine.    Data:    Session content: At the start of this group, patients were invited to check in by identifying themselves, describing their current emotional status, and identifying issues to address in this group.   Area(s) of treatment focus addressed in this session included Symptom Management and Personal Safety.    Daniel is welcomed and oriented to group on this the first day of group telehealth. He reports that he cut his thumb this weekend while chopping wood. He shares that he struggles with depression, but has been  feeling a little better due to distracting himself more often ie: work and chopping wood with a friend. He struggles with poor sleep and we discussed sleep hygiene techniques. Daniel appeared restless during the group and at one point began vaping and jokingly said that it was THC. He then assured the group that it was tobacco and agreed to vape only on breaks. No safety concerns.    Therapeutic Interventions/Treatment Strategies:  Psychotherapist offered support, feedback and validation, set limits, provided redirection and reinforced use of skills. Treatment modalities used include Cognitive Behavioral Therapy. Interventions include Behavioral Activation: Reinforced benefits/challenges of change process through applying skills to replace unwanted behaviors and Explored how behaviors effect mood and interact with thoughts and feelings.    Assessment:    Patient response:   Patient responded to session by accepting feedback, giving feedback, listening, focusing on goals, being attentive and appearing distracted    Possible barriers to participation / learning include: and no barriers identified    Health Issues:   None reported       Substance Use Review:   Substance Use: No active concerns identified.    Mental Status/Behavioral Observations  Appearance:   Appropriate   Eye Contact:   Good   Psychomotor Behavior: Restless   Attitude:   Cooperative   Orientation:   All  Speech   Rate / Production: Normal/ Responsive Normal    Volume:  Normal   Mood:    Anxious  Depressed   Affect:    Appropriate   Thought Content:   Clear  Thought Form:  Coherent  Logical     Insight:    Fair     Plan:     Safety Plan: Recommended that patient call 911 or go to the local ED should there be a change in any of these risk factors.     Barriers to treatment: None identified    Patient Contracts (see media tab):  None    Substance Use: Not addressed in session     Continue or Discharge: Patient will continue in Adult Day Treatment (ADT)   as planned. Patient is likely to benefit from learning and using skills as they work toward the goals identified in their treatment plan.      Reba Moscoso, [facility]  April 7, 2020

## 2020-04-08 ENCOUNTER — TELEPHONE (OUTPATIENT)
Dept: BEHAVIORAL HEALTH | Facility: CLINIC | Age: 25
End: 2020-04-08

## 2020-04-08 ENCOUNTER — HOSPITAL ENCOUNTER (OUTPATIENT)
Dept: BEHAVIORAL HEALTH | Facility: CLINIC | Age: 25
End: 2020-04-08
Attending: PSYCHIATRY & NEUROLOGY
Payer: COMMERCIAL

## 2020-04-08 PROCEDURE — 90853 GROUP PSYCHOTHERAPY: CPT | Mod: GT

## 2020-04-08 PROCEDURE — 90853 GROUP PSYCHOTHERAPY: CPT | Mod: GT | Performed by: PSYCHOLOGIST

## 2020-04-08 NOTE — GROUP NOTE
Psychotherapy Group Note    PATIENT'S NAME: Daniel Thomas  MRN:   [id number]  :   1995  ACCT. NUMBER: [id number]  DATE OF SERVICE: 20  START TIME:  3:00 PM  END TIME:  3:50 PM  FACILITATOR: Reina Brown PsyD  TOPIC:  EBP Group: Emotions Management  Adult Mental Health Day Treatment  TRACK: 3B    NUMBER OF PARTICIPANTS: 5    Telemedicine Visit: The patient's condition can be safely assessed and treated via synchronous audio and visual telemedicine encounter.      Reason for Telemedicine Visit: Patient unable to travel due to COVID19    Originating Site (Patient Location): Patient's home    Distant Site (Provider Location): Provider Remote Setting    Consent:  The patient/guardian has verbally consented to: the potential risks and benefits of telemedicine (video visit) versus in person care; bill my insurance or make self-payment for services provided; and responsibility for payment of non-covered services.     Mode of Communication:  Video Conference via [employer]    As the provider I attest to compliance with applicable laws and regulations related to telemedicine.     Summary of Group / Topics Discussed:  Emotions Management: Emotions and the Brain: Patients discussed the purpose of the emotions including the biological basis of emotion, with an emphasis on how biology underlies our experience of emotion.  Also occurring, patients shared ways they calm the central nervous system. Patients were introduced to smart phone apps to self-sooth.    Patient Session Goals / Objectives:    Explore what patients know about the brain and how it relates to emotions.    Apply understanding of content to their own experiences of emotions.    Learn ways to calm the central nervous system        Patient Participation / Response:  Moderately participated, sharing some personal reflections / insights and adequately adequately received / provided feedback with other participants.    Demonstrated  understanding of topics discussed through group discussion and participation, Expressed understanding of the relevance / importance of emotions management skills at distressing times in life and Identified emotions management strategies that have helped maintain / improve symptoms in the past    Treatment Plan:  Patient has a current master individualized treatment plan.  See Epic treatment plan for more information.    Reina Brown PsyD

## 2020-04-08 NOTE — TELEPHONE ENCOUNTER
Returned his call. He called to say that he last group early because of phone issues. He also asked why I asked him not to drive and do telehealth at the same time. Let him know that it could be unsafe and that I wanted him to be engaged with the group. He agreed to not drive in the future,

## 2020-04-09 ENCOUNTER — HOSPITAL ENCOUNTER (OUTPATIENT)
Dept: BEHAVIORAL HEALTH | Facility: CLINIC | Age: 25
End: 2020-04-09
Attending: PSYCHIATRY & NEUROLOGY
Payer: COMMERCIAL

## 2020-04-09 PROCEDURE — G0177 OPPS/PHP; TRAIN & EDUC SERV: HCPCS | Mod: GT

## 2020-04-09 PROCEDURE — 90853 GROUP PSYCHOTHERAPY: CPT | Mod: GT

## 2020-04-09 NOTE — GROUP NOTE
"Psychoeducation Group Note    PATIENT'S NAME: Daniel Thomas  MRN:   [id number]  :   1995  ACCT. NUMBER: [id number]  DATE OF SERVICE: 20  START TIME:  2:00 PM  END TIME:  2:50 PM  FACILITATOR: Arsalan Galloway OTR/L  TOPIC:  Life Skills Group: Communication and Social Skills Development  Adult Mental Health Day Treatment  TRACK: 3B    NUMBER OF PARTICIPANTS: 6    Summary of Group / Topics Discussed:  Communication and Social Skills Development: Communication Styles: Communication Skills Scale:Patients completed a self assessment of personal communication skills by identifying both strengths and opportunities for growth in areas of messages, emotions, assertiveness and listening skills.  Patients gained awareness of effective communication skills to improve overall communication and connection with other people.      Patient Session Goals / Objectives:    Identified strengths and opportunities for growth in communication skills and how these  impact their ability to communicate clearly with other people       Improved awareness of important aspects of communication skills and how this relates to mental health recovery        Established a plan for practice of these skills in their own environments    Practiced and reflected on how to generalize taught skills to their everyday life      Patient Participation / Response:  Moderately participated, sharing some personal reflections / insights and adequately adequately received / provided feedback with other participants.    Patient presentation: Generally calm,alert and focused with stable mood and thought process. Patient reported that his perceived mental health recovery was about a \"6\" on a scle of 1-10. Patient reported that when starting the program it was about a 3/4. Patient acknowledged a need to be more effective in communication with mother.    Treatment Plan:  Patient has a current master individualized treatment plan.  See Epic treatment " plan for more information.    Arsalan Galloway, OTR/L

## 2020-04-09 NOTE — ADDENDUM NOTE
Encounter addended by: Reba Irby LICSW on: 4/9/2020 11:22 AM   Actions taken: Clinical Note Signed, Charge Capture section accepted

## 2020-04-09 NOTE — GROUP NOTE
Process Group Note    PATIENT'S NAME: Daniel Thomas  MRN:   [id number]  :   1995  ACCT. NUMBER: [id number]  DATE OF SERVICE: 20  START TIME:  1:00 PM  END TIME:  1:50 PM  FACILITATOR: Reba Irby LICSW  TOPIC:  Process Group    Diagnoses:  Diagnoses:      Autism Spectrum Disorder 299.00(F84.0)  Associated with a known medical or genetic condition or environmental factor- per patient  296.33 (F33.2) Major Depressive Disorder, Recurrent Episode, Severe _ and With anxious distress  300.3 (F42) Obsessive Compulsive Disorder- per patient              Adult Mental Health Day Treatment  TRACK: 3B    NUMBER OF PARTICIPANTS: 4    Telemedicine Visit: The patient's condition can be safely assessed and treated via synchronous audio and visual telemedicine encounter.      Reason for Telemedicine Visit: Services only offered telehealth    Originating Site (Patient Location): Patient's home    Distant Site (Provider Location): [facility]: [facility] [place]    Consent:  The patient/guardian has verbally consented to: the potential risks and benefits of telemedicine (video visit) versus in person care; bill my insurance or make self-payment for services provided; and responsibility for payment of non-covered services.     Mode of Communication:  Video Conference via [employer]    As the provider I attest to compliance with applicable laws and regulations related to telemedicine.       Data:    Session content: At the start of this group, patients were invited to check in by identifying themselves, describing their current emotional status, and identifying issues to address in this group.   Area(s) of treatment focus addressed in this session included Symptom Management and Personal Safety.  Daniel reports that he struggles with depression and anxiety. He states that he is bored since the covid restrictions have been in place despite continuing to work full-time. He is glad to be helping a friend  "with chopping down trees for a couple of days. He reports a struggle with constant hand-washing, but does not want to share any additional information with the group. He saw his dr this week and will resume some meds that have been helpful in the past. He reports suicidal thinking that \"comes and goes\", although assured the group that he is committed to safety. Daniel was sitting in his car through out the session, saying that he just got home from work. At one point he began driving and was encouraged to pull over, so he could stay safe and concentrate. He agreed.    Therapeutic Interventions/Treatment Strategies:  Psychotherapist offered support, feedback and validation, set limits, provided redirection and reinforced use of skills. Treatment modalities used include Cognitive Behavioral Therapy. Interventions include Behavioral Activation: Reinforced benefits/challenges of change process through applying skills to replace unwanted behaviors and Explored how behaviors effect mood and interact with thoughts and feelings and Cognitive Restructuring:  Explored impact of ineffective thoughts / distortions on mood and activity.    Assessment:    Patient response:   Patient responded to session by accepting feedback, giving feedback, listening, focusing on goals, accepting support and appearing distracted    Possible barriers to participation / learning include: and no barriers identified    Health Issues:   None reported       Substance Use Review:   Substance Use: No active concerns identified.    Mental Status/Behavioral Observations  Appearance:   Appropriate   Eye Contact:   Fair   Psychomotor Behavior: Normal  Restless   Attitude:   Cooperative  but inattentive at times   Orientation:   All  Speech   Rate / Production: Normal/ Responsive Normal    Volume:  Normal   Mood:    Anxious  Depressed   Affect:    Restricted   Thought Content:   Clear  Thought Form:  Coherent  Logical  Obsessive     Insight:    Fair "     Plan:     Safety Plan: Committed to safety and agreed to follow previously developed safety coping plan.      Barriers to treatment: None identified    Patient Contracts (see media tab):  None    Substance Use: Not addressed in session     Continue or Discharge: Patient will continue in Adult Day Treatment (ADT)  as planned. Patient is likely to benefit from learning and using skills as they work toward the goals identified in their treatment plan.      Reba Moscoso, [facility]  April 9, 2020

## 2020-04-10 NOTE — ADDENDUM NOTE
Encounter addended by: Joanne Johnston [facility] on: 4/10/2020 4:05 PM   Actions taken: Charge Capture section accepted

## 2020-04-10 NOTE — GROUP NOTE
Process Group Note    PATIENT'S NAME: Daniel Thomas  MRN:   [id number]  :   1995  ACCT. NUMBER: [id number]  DATE OF SERVICE: 20  START TIME:  2:00 PM  END TIME:  2:50 PM  FACILITATOR: Reba Irby LICSW  TOPIC:  Process Group    Diagnoses:  Diagnoses:      Autism Spectrum Disorder 299.00(F84.0)  Associated with a known medical or genetic condition or environmental factor- per patient  296.33 (F33.2) Major Depressive Disorder, Recurrent Episode, Severe _ and With anxious distress  300.3 (F42) Obsessive Compulsive Disorder- per patient              Adult Mental Health Day Treatment  TRACK: 3B    NUMBER OF PARTICIPANTS: 5  Telemedicine Visit: The patient's condition can be safely assessed and treated via synchronous audio and visual telemedicine encounter.      Reason for Telemedicine Visit: Services only offered telehealth    Originating Site (Patient Location): Patient's home    Distant Site (Provider Location): [facility]: [facility] [place]    Consent:  The patient/guardian has verbally consented to: the potential risks and benefits of telemedicine (video visit) versus in person care; bill my insurance or make self-payment for services provided; and responsibility for payment of non-covered services.     Mode of Communication:  Video Conference via [employer]    As the provider I attest to compliance with applicable laws and regulations related to telemedicine.           Data:    Session content: At the start of this group, patients were invited to check in by identifying themselves, describing their current emotional status, and identifying issues to address in this group.   Area(s) of treatment focus addressed in this session included Symptom Management and Personal Safety.  Daniel reports that his anxiety is better, but he always has a lot on his mind. He is unsure why he feels better. 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  He feels positive about this. Daniel continues to struggle with hand washing and is concerned that there is now a soap shortage, due to covid 19. He received feedback that there is not a soap shortage. He does not want to share more about this in group and continues to work with his individual therapist. He states that his suicidal ideation has decreased and that there are no safety concerns. Daniel began cooking in his apartment while the group was still in session. After group we discussed this 1-1 and encouraged him to try to stay focused on the session.    Therapeutic Interventions/Treatment Strategies:  Psychotherapist offered support, feedback and validation, provided redirection and reinforced use of skills. Treatment modalities used include Cognitive Behavioral Therapy. Interventions include Behavioral Activation: Reinforced benefits/challenges of change process through applying skills to replace unwanted behaviors and Explored how behaviors effect mood and interact with thoughts and feelings and Cognitive Restructuring:  Facilitated recognition of the connection between negative thoughts and negative core beliefs.    Assessment:    Patient response:   Patient responded to session by accepting feedback, giving feedback, listening and appearing distracted    Possible barriers to participation / learning include: and no barriers identified    Health Issues:   None reported       Substance Use Review:   Substance Use: No active concerns identified.    Mental Status/Behavioral Observations  Appearance:   Appropriate   Eye Contact:   Fair   Psychomotor Behavior: Restless   Attitude:   Cooperative   Orientation:   All  Speech   Rate / Production: Normal/ Responsive Normal    Volume:  Normal   Mood:    Anxious  Depressed   Affect:    Appropriate   Thought Content:   Clear  Thought Form:  Coherent  Obsessive     Insight:    Fair     Plan:     Safety Plan: Committed to safety and agreed to follow previously developed  safety coping plan.      Barriers to treatment: None identified    Patient Contracts (see media tab):  None    Substance Use: Not addressed in session     Continue or Discharge: Patient will continue in Adult Day Treatment (ADT)  as planned. Patient is likely to benefit from learning and using skills as they work toward the goals identified in their treatment plan.      Reba Moscoso, [facility]  April 10, 2020

## 2020-04-10 NOTE — GROUP NOTE
"Process Group Note    PATIENT'S NAME: Daniel Thomas  MRN:   [id number]  :   1995  ACCT. NUMBER: [id number]  DATE OF SERVICE: 20  START TIME:  3:00 PM  END TIME:  3:50 PM  FACILITATOR: Reba Irby LICSW  TOPIC:  Process Group    Diagnoses:  Diagnoses:      Autism Spectrum Disorder 299.00(F84.0)  Associated with a known medical or genetic condition or environmental factor- per patient  296.33 (F33.2) Major Depressive Disorder, Recurrent Episode, Severe _ and With anxious distress  300.3 (F42) Obsessive Compulsive Disorder- per patient              Adult Mental Health Day Treatment  TRACK: 3B    NUMBER OF PARTICIPANTS: 5    .          Data:    Session content: At the start of this group, patients were invited to check in by identifying themselves, describing their current emotional status, and identifying issues to address in this group.   Area(s) of treatment focus addressed in this session included Symptom Management and Personal Safety.   Daniel reports that both his anxiety and suicidal ideation are improved, although he is unsure why. 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 He does admit to concern about a \"shortage of soap\" and is washing his hands compulsively. Patient did not want to elaborate on this, but was assured that there is not a shortage of soap. Near the end of the session he moved to his kitchen and began cooking. We met 1-1 afterwards and discussed the importance of focusing on the group process. He agreed. No safety concerns.  Telemedicine Visit: The patient's condition can be safely assessed and treated via synchronous audio and visual telemedicine encounter.      Reason for Telemedicine Visit: Services only offered telehealth    Originating Site (Patient Location): Patient's home    D[place] Site ([place] Location[place] [facility] [facility] [place]    Consent:  The patient/guardian has verbally consented " to: the potential risks and benefits of telemedicine (video visit) versus in person care; bill my insurance or make self-payment for services provided; and responsibility for payment of non-covered services.     Mode of Communication:  Video Conference via [employer]    As the provider I attest to compliance with applicable laws and regulations related to telemedicine.    Therapeutic Interventions/Treatment Strategies:  Psychotherapist offered support, feedback and validation, provided redirection and reinforced use of skills. Treatment modalities used include Cognitive Behavioral Therapy. Interventions include Behavioral Activation: Reinforced benefits/challenges of change process through applying skills to replace unwanted behaviors and Cognitive Restructuring:  Assisted patient in identifying new neutral/positive core beliefs.    Assessment:    Patient response:   Patient responded to session by accepting feedback, giving feedback, listening, focusing on goals, accepting support and appearing distracted    Possible barriers to participation / learning include: and no barriers identified    Health Issues:   None reported       Substance Use Review:   Substance Use: No active concerns identified.    Mental Status/Behavioral Observations  Appearance:   Appropriate   Eye Contact:   Fair   Psychomotor Behavior: Restless   Attitude:   Cooperative   Orientation:   All  Speech   Rate / Production: Normal/ Responsive Normal    Volume:  Normal   Mood:    Anxious  Depressed   Affect:    Appropriate   Thought Content:   Rumination  Thought Form:  Coherent  Logical  Obsessive     Insight:    Fair     Plan:     Safety Plan: Committed to safety and agreed to follow previously developed safety coping plan.      Barriers to treatment: None identified    Patient Contracts (see media tab):  None    Substance Use: Not addressed in session     Continue or Discharge: Patient will continue in Adult Day Treatment (ADT)  as planned.  Patient is likely to benefit from learning and using skills as they work toward the goals identified in their treatment plan.      Reba Moscoso, [facility]  April 10, 2020

## 2020-04-13 ENCOUNTER — HOSPITAL ENCOUNTER (OUTPATIENT)
Dept: BEHAVIORAL HEALTH | Facility: CLINIC | Age: 25
End: 2020-04-13
Attending: PSYCHIATRY & NEUROLOGY
Payer: COMMERCIAL

## 2020-04-13 PROCEDURE — 90853 GROUP PSYCHOTHERAPY: CPT | Mod: GT

## 2020-04-13 PROCEDURE — G0177 OPPS/PHP; TRAIN & EDUC SERV: HCPCS | Mod: GT

## 2020-04-13 NOTE — GROUP NOTE
Psychoeducation Group Note    PATIENT'S NAME: Daniel Thomas  MRN:   [id number]  :   1995  ACCT. NUMBER: [id number]  DATE OF SERVICE: 20  START TIME:  2:00 PM  END TIME:  2:50 PM  FACILITATOR: Vivien Bueno RN  TOPIC: TWIN RN Group: Mental Health Maintenance  Telemedicine Visit: The patient's condition can be safely assessed and treated via synchronous audio and visual telemedicine encounter.      Reason for Telemedicine Visit:  COVID19    Originating Site (Patient Location): Patient's home    Distant Site (Provider Location): Provider Remote Setting    Consent:  The patient/guardian has verbally consented to: the potential risks and benefits of telemedicine (video visit) versus in person care; bill my insurance or make self-payment for services provided; and responsibility for payment of non-covered services.     Mode of Communication:  Video Conference via [employer]    As the provider I attest to compliance with applicable laws and regulations related to telemedicine.      Adult Mental Health Day Treatment  TRACK: 3B    NUMBER OF PARTICIPANTS: 4    Summary of Group / Topics Discussed:  Mental Health Maintenance:  Stigma: In this group patients explored stigma surrounding a mental health diagnosis.  The group discussed the way stigma impacts their own life, and discussed strategies to reduce. The relationship between physical and mental health were also explored in the context of healthcare access, treatment, and support.    Patient Session Goals / Objectives:  ? Patients identified the importance of practicing emotional hygiene  ? Patients identified ways to decrease the  impact of stigma in their own life          Patient Participation / Response:  Fully participated with the group by sharing personal reflections / insights and openly received / provided feedback with other participants.    Demonstrated understanding of topics discussed through group discussion and participation    Treatment  Plan:  Patient has a current master individualized treatment plan.  See Epic treatment plan for more information.    Vivien Bueno RN

## 2020-04-14 NOTE — GROUP NOTE
Psychotherapy Group Note    PATIENT'S NAME: Daniel Thomas  MRN:   [id number]  :   1995  ACCT. NUMBER: [id number]  DATE OF SERVICE: 20  START TIME:  1:00 PM  END TIME:  1:50 PM  FACILITATOR: Reba Irby LICSW  TOPIC: MH EBP Group: Self-Awareness  Adult Mental Health Day Treatment  TRACK: 3B    NUMBER OF PARTICIPANTS: 3  Telemedicine Visit: The patient's condition can be safely assessed and treated via synchronous audio and visual telemedicine encounter.      Reason for Telemedicine Visit: Services only offered telehealth    Originating Site (Patient Location): Patient's home    Distant Site (Provider Location): [facility]: [facility] [place]    Consent:  The patient/guardian has verbally consented to: the potential risks and benefits of telemedicine (video visit) versus in person care; bill my insurance or make self-payment for services provided; and responsibility for payment of non-covered services.     Mode of Communication:  Video Conference via [employer]    As the provider I attest to compliance with applicable laws and regulations related to telemedicine.     Summary of Group / Topics Discussed:  Self-Awareness: Personal Strengths: Topic focused on assisting patients in identifying personal strengths and how they relate to the management of mental health symptoms. Patients discussed the benefits of acknowledging their personal strengths and their impact on mood improvement, mindfulness, and perspective. Patients worked to increase time spent on recognition and appreciation of what is positive and working in their lives. The goal is to reduce rumination and negative thinking resulting in increased mindfulness and resilience. Patients will work to put skills into practice and problem-solve barriers.     Patient Session Goals / Objectives:    Identified personal strengths    Identified barriers to recognition of personal strengths    Verbalized understanding of strategies to  increase use of their strengths in management of daily symptoms      Patient Participation / Response:  Fully participated with the group by sharing personal reflections / insights and openly received / provided feedback with other participants.    Demonstrated understanding of topics discussed through group discussion and participation    Treatment Plan:  Patient has a current master individualized treatment plan.  See Epic treatment plan for more information.    Reba Moscoso, LICSW

## 2020-04-14 NOTE — GROUP NOTE
Process Group Note    PATIENT'S NAME: Daniel Thomas  MRN:   [id number]  :   1995  ACCT. NUMBER: [id number]  DATE OF SERVICE: 20  START TIME:  3:00 PM  END TIME:  3:50 PM  FACILITATOR: Reba Irby LICSW  TOPIC:  Process Group    Diagnoses:  Diagnoses:      Autism Spectrum Disorder 299.00(F84.0)  Associated with a known medical or genetic condition or environmental factor- per patient  296.33 (F33.2) Major Depressive Disorder, Recurrent Episode, Severe _ and With anxious distress  300.3 (F42) Obsessive Compulsive Disorder- per patient              Adult Mental Health Day Treatment  TRACK: 3B    NUMBER OF PARTICIPANTS: 3  Telemedicine Visit: The patient's condition can be safely assessed and treated via synchronous audio and visual telemedicine encounter.      Reason for Telemedicine Visit: Services only offered telehealth    Originating Site (Patient Location): Patient's home    Distant Site (Provider Location): [facility]: [facility]    Consent:  The patient/guardian has verbally consented to: the potential risks and benefits of telemedicine (video visit) versus in person care; bill my insurance or make self-payment for services provided; and responsibility for payment of non-covered services.     Mode of Communication:  Video Conference via [employer]    As the provider I attest to compliance with applicable laws and regulations related to telemedicine.           Data:    Session content: At the start of this group, patients were invited to check in by identifying themselves, describing their current emotional status, and identifying issues to address in this group.   Area(s) of treatment focus addressed in this session included Symptom Management and Personal Safety.  Daniel reports that his depression and anxiety may be a little better. He states that he used to get mad at his co-workers and others, but that has subsided. He is sleeping a little better and started a new med  2 days ago. He feels that the group may have helped him to recover. He again split logs with a friend and this has been positive. No safety concerns.    Therapeutic Interventions/Treatment Strategies:  Psychotherapist offered support, feedback and validation and reinforced use of skills. Treatment modalities used include Cognitive Behavioral Therapy. Interventions include Behavioral Activation: Reinforced benefits/challenges of change process through applying skills to replace unwanted behaviors.    Assessment:    Patient response:   Patient responded to session by accepting feedback, giving feedback, listening, focusing on goals, being attentive and accepting support    Possible barriers to participation / learning include: and no barriers identified    Health Issues:   None reported       Substance Use Review:   Substance Use: No active concerns identified.    Mental Status/Behavioral Observations  Appearance:   Appropriate   Eye Contact:   Good   Psychomotor Behavior: Normal   Attitude:   Cooperative   Orientation:   All  Speech   Rate / Production: Normal/ Responsive Normal    Volume:  Normal   Mood:    Anxious   Affect:    Appropriate   Thought Content:   Clear  Thought Form:  Coherent  Logical     Insight:    Good     Plan:     Safety Plan: Recommended that patient call 911 or go to the local ED should there be a change in any of these risk factors.     Barriers to treatment: None identified    Patient Contracts (see media tab):  None    Substance Use: Not addressed in session     Continue or Discharge: Patient will continue in Adult Day Treatment (ADT)  as planned. Patient is likely to benefit from learning and using skills as they work toward the goals identified in their treatment plan.      Reba Moscoso, [facility]  April 14, 2020

## 2020-04-14 NOTE — ADDENDUM NOTE
Encounter addended by: Reba Irby LIC on: 4/14/2020 10:48 AM   Actions taken: Clinical Note Signed, Charge Capture section accepted

## 2020-04-14 NOTE — PROGRESS NOTES
Current Outpatient Medications:      amphetamine-dextroamphetamine (ADDERALL) 10 MG tablet, Take 1 tablet (10 mg) by mouth 2 times daily, Disp: 60 tablet, Rfl: 0     [START ON 5/8/2020] amphetamine-dextroamphetamine (ADDERALL) 10 MG tablet, Take 1 tablet (10 mg) by mouth 2 times daily, Disp: 60 tablet, Rfl: 0     [START ON 6/8/2020] amphetamine-dextroamphetamine (ADDERALL) 10 MG tablet, Take 1 tablet (10 mg) by mouth 2 times daily, Disp: 60 tablet, Rfl: 0     DULoxetine (CYMBALTA) 30 MG capsule, Take 1 capsule (30 mg) by mouth daily for 7 days, THEN 2 capsules (60 mg) daily., Disp: 60 capsule, Rfl: 1   Psychiatry staffing: case discussed  Diagnosis:  ASD, MDD, OCD, ADHD'  Works for [employer].  Multitasks in group. Participates.  Improved in group.

## 2020-04-15 ENCOUNTER — HOSPITAL ENCOUNTER (OUTPATIENT)
Dept: BEHAVIORAL HEALTH | Facility: CLINIC | Age: 25
End: 2020-04-15
Attending: PSYCHIATRY & NEUROLOGY
Payer: COMMERCIAL

## 2020-04-15 PROCEDURE — 90853 GROUP PSYCHOTHERAPY: CPT | Mod: GT

## 2020-04-15 PROCEDURE — G0177 OPPS/PHP; TRAIN & EDUC SERV: HCPCS | Mod: GT

## 2020-04-15 NOTE — GROUP NOTE
Psychoeducation Group Note    PATIENT'S NAME: Daniel Thomas  MRN:   [id number]  :   1995  ACCT. NUMBER: [id number]  DATE OF SERVICE: 4/15/20  START TIME:  3:00 PM  END TIME:  3:50 PM  FACILITATOR: Dale Whitley OT  TOPIC: MH Life Skills Group: Sensory Approaches in Mental Health  Adult Mental Health Day Treatment  TRACK:3B    NUMBER OF PARTICIPANTS: 6    Summary of Group / Topics Discussed:  Sensory Approaches in Mental Health:  Sensory Enhanced Mindfulness: Patients were taught and provided with an opportunity to explore and practice how using sensory enhanced mindfulness practices can help them stay grounded in the present moment as a way to manage mental health symptoms and stressors. Focus on experiences with practicing mindful meditation and discussing other ways to be mindful that may be helpful to them. Facilitation of breathing experiential focused on healthy and effective breathing strategies for calming/grounding and also for building lung health in medically sound ways.        Patient Session Goals / Objectives:    Identified how using sensory enhanced mindfulness practices can be used for grounding, stress management, and self regulation      Improved awareness of different types of sensory enhanced mindfulness activities that assist with healthy coping of stress and symptoms      Established a plan for practice of these skills in their own environments    Practiced and reflected on how to generalize taught skills to their everyday life        Patient Participation / Response:  Moderately participated, sharing some personal reflections / insights and adequately adequately received / provided feedback with other participants.    Verbalized understanding of content and Patient would benefit from additional opportunities to practice the content to be able to generalize it to their everyday life with increased intentionality, consistency, and efficacy in support of their psychiatric  recovery    Treatment Plan:  Patient has a current master individualized treatment plan.  See Epic treatment plan for more information.    Dale Whitley, OT

## 2020-04-15 NOTE — GROUP NOTE
Process Group Note    PATIENT'S NAME: Daniel Thomas  MRN:   [id number]  :   1995  ACCT. NUMBER: [id number]  DATE OF SERVICE: 4/15/20  START TIME:  1:00 PM  END TIME:  1:50 PM  FACILITATOR: Reba Irby LICSW  TOPIC:  Process Group    Diagnoses:  Diagnoses:      Autism Spectrum Disorder 299.00(F84.0)  Associated with a known medical or genetic condition or environmental factor- per patient  296.33 (F33.2) Major Depressive Disorder, Recurrent Episode, Severe _ and With anxious distress  300.3 (F42) Obsessive Compulsive Disorder- per patient              Adult Mental Health Day Treatment  TRACK: 3B    NUMBER OF PARTICIPANTS: 6    Telemedicine Visit: The patient's condition can be safely assessed and treated via synchronous audio and visual telemedicine encounter.      Reason for Telemedicine Visit: Services only offered telehealth    Originating Site (Patient Location): Patient's home    Distant Site (Provider Location): [facility]    Consent:  The patient/guardian has verbally consented to: the potential risks and benefits of telemedicine (video visit) versus in person care; bill my insurance or make self-payment for services provided; and responsibility for payment of non-covered services.     Mode of Communication:  Video Conference via [employer]    As the provider I attest to compliance with applicable laws and regulations related to telemedicine.       Data:    Session content: At the start of this group, patients were invited to check in by identifying themselves, describing their current emotional status, and identifying issues to address in this group.   Area(s) of treatment focus addressed in this session included Symptom Management and Personal Safety.  Daniel identifies much progress since starting the program. He states that he typically does not talk about his emotions, but feels that this has been key to his recovery process. He also talks about the  importance of physical exertion to reduce anxiety. Today he walked a lot on the job. No safety concerns.    Therapeutic Interventions/Treatment Strategies:  Psychotherapist offered support, feedback and validation and reinforced use of skills. Treatment modalities used include Cognitive Behavioral Therapy. Interventions include Behavioral Activation: Reinforced benefits/challenges of change process through applying skills to replace unwanted behaviors and Explored how behaviors effect mood and interact with thoughts and feelings and Cognitive Restructuring:  Assisted patient in formulating new neutral/positive alternatives to challenge less helpful / ineffective thoughts.    Assessment:    Patient response:   Patient responded to session by accepting feedback, giving feedback, listening, focusing on goals, being attentive and accepting support    Possible barriers to participation / learning include: and no barriers identified    Health Issues:   None reported       Substance Use Review:   Substance Use: No active concerns identified.    Mental Status/Behavioral Observations  Appearance:   Appropriate   Eye Contact:   Good   Psychomotor Behavior: Normal   Attitude:   Cooperative   Orientation:   All  Speech   Rate / Production: Normal/ Responsive Normal    Volume:  Normal   Mood:    Anxious   Affect:    Appropriate   Thought Content:   Clear  Thought Form:  Coherent  Logical     Insight:    Good     Plan:     Safety Plan: Recommended that patient call 911 or go to the local ED should there be a change in any of these risk factors.     Barriers to treatment: None identified    Patient Contracts (see media tab):  None    Substance Use: Not addressed in session     Continue or Discharge: Patient will continue in Adult Day Treatment (ADT)  as planned. Patient is likely to benefit from learning and using skills as they work toward the goals identified in their treatment plan.      Reba Moscoso, [facility]  April 15, 2020

## 2020-04-16 ENCOUNTER — TELEPHONE (OUTPATIENT)
Dept: BEHAVIORAL HEALTH | Facility: CLINIC | Age: 25
End: 2020-04-16

## 2020-04-16 NOTE — ADDENDUM NOTE
Encounter addended by: Reba Irby [facility] on: 4/16/2020 10:29 AM   Actions taken: Flowsheet accepted

## 2020-04-16 NOTE — TELEPHONE ENCOUNTER
Spoke with patient today by phone because of [employer] outage. Reported stable mood with no personal safety issues. Patient did not feel that a 1:1 session with psychotherapist was needed today.

## 2020-04-16 NOTE — GROUP NOTE
Psychotherapy Group Note    PATIENT'S NAME: Dainel Thomas  MRN:   [id number]  :   1995  ACCT. NUMBER: [id number]  DATE OF SERVICE: 4/15/20  START TIME:  2:00 PM  END TIME:  2:50 PM  FACILITATOR: Reba Irby LICSW  TOPIC: MH EBP Group: Self-Awareness  Adult Mental Health Day Treatment  TRACK: 3B    NUMBER OF PARTICIPANTS: 7  Telemedicine Visit: The patient's condition can be safely assessed and treated via synchronous audio and visual telemedicine encounter.      Reason for Telemedicine Visit: Services only offered telehealth    Originating Site (Patient Location): Patient's home    Distant Site (Provider Location): [facility]: [facility]    Consent:  The patient/guardian has verbally consented to: the potential risks and benefits of telemedicine (video visit) versus in person care; bill my insurance or make self-payment for services provided; and responsibility for payment of non-covered services.     Mode of Communication:  Video Conference via [employer]    As the provider I attest to compliance with applicable laws and regulations related to telemedicine.   Summary of Group / Topics Discussed:  Self-Awareness: Values: Patients identified personal values by examining development of their current values and how their values influence their daily functioning and life choices. Patients explored the impact of their values on their thoughts, feelings, and actions. Patients discussed definition of personal values and how they develop and change over time. The goal is to help patients reconcile value conflicts and achieve balance and flexibility to improve mood and daily functioning.     Patient Session Goals / Objectives:    Examined development of values and impact of values on functioning    Identified and prioritized important values related to current well-being     Identified strategies to change or enhance values to positively impact symptoms    Assisted patients to find ways to  adapt functioning to better fit their values        Patient Participation / Response:  Fully participated with the group by sharing personal reflections / insights and openly received / provided feedback with other participants.    Demonstrated understanding of topics discussed through group discussion and participation and Demonstrated understanding of values, strengths, and challenges to learn about themselves    Treatment Plan:  Patient has a current master individualized treatment plan.  See Epic treatment plan for more information.    Reba Moscoso, LICSW

## 2020-04-17 ENCOUNTER — TELEPHONE (OUTPATIENT)
Dept: BEHAVIORAL HEALTH | Facility: CLINIC | Age: 25
End: 2020-04-17

## 2020-04-20 ENCOUNTER — HOSPITAL ENCOUNTER (OUTPATIENT)
Dept: BEHAVIORAL HEALTH | Facility: CLINIC | Age: 25
End: 2020-04-20
Attending: PSYCHIATRY & NEUROLOGY
Payer: COMMERCIAL

## 2020-04-20 PROCEDURE — 90853 GROUP PSYCHOTHERAPY: CPT | Mod: GT | Performed by: COUNSELOR

## 2020-04-20 PROCEDURE — G0177 OPPS/PHP; TRAIN & EDUC SERV: HCPCS | Mod: GT

## 2020-04-20 NOTE — GROUP NOTE
Psychoeducation Group Note    PATIENT'S NAME: Daniel Thomas  MRN:   [id number]  :   1995  ACCT. NUMBER: [id number]  DATE OF SERVICE: 20  START TIME:  2:00 PM  END TIME:  2:50 PM  FACILITATOR: Vivien Bueno RN  TOPIC: TWIN RN Group: Brain Health  Telemedicine Visit: The patient's condition can be safely assessed and treated via synchronous audio and visual telemedicine encounter.      Reason for Telemedicine Visit:  COVID19    Originating Site (Patient Location): Patient's home    Distant Site (Provider Location): Provider Remote Setting    Consent:  The patient/guardian has verbally consented to: the potential risks and benefits of telemedicine (video visit) versus in person care; bill my insurance or make self-payment for services provided; and responsibility for payment of non-covered services.     Mode of Communication:  Video Conference via [employer]    As the provider I attest to compliance with applicable laws and regulations related to telemedicine.      Adult Mental Health Day Treatment  TRACK: 3B    NUMBER OF PARTICIPANTS: 6    Summary of Group / Topics Discussed:  Brain Health:  Pathophysiology of Mood Disorders: Patients were educated on mood disorder etiology and neuroscience, risk factors, symptoms, and pharmacologic, psychotherapeutic, and complementary treatment options. Patients were guided on a discussion of mental, behavioral, and physical symptoms and shared their symptoms with the group.     Patient Session Goals / Objectives:  ? Described what mood disorders are and identified risk factors   ? Explained how chemical imbalances in the brain can cause symptoms and how medications work to reverse this imbalance   ? Identified and described pharmacologic, psychotherapeutic, and complementary treatment options        Patient Participation / Response:  Moderately participated, sharing some personal reflections / insights and adequately adequately received / provided feedback  with other participants.    Verbalized understanding of brain health topic    Treatment Plan:  Patient has a current master individualized treatment plan.  See Epic treatment plan for more information.    Vivien Bueno RN

## 2020-04-21 NOTE — GROUP NOTE
"Process Group Note    PATIENT'S NAME: Daniel Thomas  MRN:   [id number]  :   1995  ACCT. NUMBER: [id number]  DATE OF SERVICE: 20  START TIME:  1:00 PM END TIME:  1:50 PM  FACILITATOR: Bella Lagunas LPCC  TOPIC:  Process Group    Diagnoses:  Autism Spectrum Disorder 299.00(F84.0)  Associated with a known medical or genetic condition or environmental factor- per patient  296.33 (F33.2) Major Depressive Disorder, Recurrent Episode, Severe _ and With anxious distress  300.3 (F42) Obsessive Compulsive Disorder- per patient        Adult Mental Health Day Treatment  TRACK: 3b    NUMBER OF PARTICIPANTS: 7    Telemedicine Visit: The patient's condition can be safely assessed and treated via synchronous audio and visual telemedicine encounter.      Reason for Telemedicine Visit: Services only offered telehealth    Originating Site (Patient Location): Patient's home    Distant Site (Provider Location): Provider Remote Setting    Consent:  The patient/guardian has verbally consented to: the potential risks and benefits of telemedicine (video visit) versus in person care; bill my insurance or make self-payment for services provided; and responsibility for payment of non-covered services.     Mode of Communication:  Video Conference via [employer]    As the provider I attest to compliance with applicable laws and regulations related to telemedicine.       Data:    Session content: At the start of this group, patients were invited to check in by identifying themselves, describing their current emotional status, and identifying issues to address in this group.   Area(s) of treatment focus addressed in this session included Symptom Management and Develop Socialization / Interpersonal Relationship Skills.  He reported that he worked over the weekend, and did not clean his apartment due to feeling \"burned up.\" He took time to process an interaction with his supervisor and noted that he would like be more " assertive. He denied any safety concerns.    Therapeutic Interventions/Treatment Strategies:  Psychotherapist offered support, feedback and validation. Interventions include Relationship Skills: Assisted patients in implementing more effective communication skills in their relationships and Discussed strategies to promote healthier understanding of interpersonal relationships.    Assessment:    Patient response:   Patient responded to session by accepting feedback, giving feedback, listening and being attentive    Possible barriers to participation / learning include: and no barriers identified    Health Issues:   None reported       Substance Use Review:   Substance Use: No active concerns identified.    Mental Status/Behavioral Observations  Appearance:   Appropriate   Eye Contact:   Fair   Psychomotor Behavior: Restless   Attitude:   Cooperative   Orientation:   All  Speech   Rate / Production: Pressured    Volume:  Normal   Mood:    Anxious  Depressed   Affect:    Blunted   Thought Content:   Rumination  Thought Form:  Coherent  Logical     Insight:    Fair     Plan:     Safety Plan: No current safety concerns identified.  Recommended that patient call 911 or go to the local ED should there be a change in any of these risk factors.     Barriers to treatment: None identified    Patient Contracts (see media tab):  None    Substance Use: Not addressed in session     Continue or Discharge: Patient will continue in Adult Day Treatment (ADT)  as planned. Patient is likely to benefit from learning and using skills as they work toward the goals identified in their treatment plan.      Bella Lagunas, CLARISSA  April 21, 2020

## 2020-04-22 ENCOUNTER — HOSPITAL ENCOUNTER (OUTPATIENT)
Dept: BEHAVIORAL HEALTH | Facility: CLINIC | Age: 25
End: 2020-04-22
Attending: PSYCHIATRY & NEUROLOGY
Payer: COMMERCIAL

## 2020-04-22 PROCEDURE — 90853 GROUP PSYCHOTHERAPY: CPT | Mod: GT | Performed by: COUNSELOR

## 2020-04-22 PROCEDURE — G0177 OPPS/PHP; TRAIN & EDUC SERV: HCPCS | Mod: GT

## 2020-04-22 PROCEDURE — 90853 GROUP PSYCHOTHERAPY: CPT | Mod: GT | Performed by: SOCIAL WORKER

## 2020-04-22 NOTE — GROUP NOTE
Psychoeducation Group Note    PATIENT'S NAME: Daniel Thomas  MRN:   [id number]  :   1995  ACCT. NUMBER: [id number]  DATE OF SERVICE: 20  START TIME:  1:00 PM  END TIME:  1:50 PM  FACILITATOR: Vivien Bueno RN  TOPIC: TWIN RN Group: Mental Health Maintenance  Telemedicine Visit: The patient's condition can be safely assessed and treated via synchronous audio and visual telemedicine encounter.      Reason for Telemedicine Visit:  COVID19    Originating Site (Patient Location): Patient's home    Distant Site (Provider Location): Provider Remote Setting    Consent:  The patient/guardian has verbally consented to: the potential risks and benefits of telemedicine (video visit) versus in person care; bill my insurance or make self-payment for services provided; and responsibility for payment of non-covered services.     Mode of Communication:  Video Conference via [employer]    As the provider I attest to compliance with applicable laws and regulations related to telemedicine.      Adult Mental Health Day Treatment  TRACK: 3B    NUMBER OF PARTICIPANTS: 7    Summary of Group / Topics Discussed:  Mental Health Maintenance:  Breathwork and relaxation - patients were presented with information on the sympathetic and parasympathetic NS as well as highlighting the role of the vagus nerve and doing breath work. 4 examples of breathwork were practiced and processed.    Patient Session Goals / Objectives:  ? Patients discovered 4 different breathwork exercises to promote relaxation  ? Patients practiced techniques that were demonstrated on the presentation  ? Patients identified one technique they will use to cope with symptoms          Patient Participation / Response:  Fully participated with the group by sharing personal reflections / insights and openly received / provided feedback with other participants.    Demonstrated understanding of topics discussed through group discussion and participation and  Identified / Expressed personal readiness to practice skills    Treatment Plan:  Patient has a current master individualized treatment plan.  See Epic treatment plan for more information.    Vivien Bueno RN

## 2020-04-22 NOTE — GROUP NOTE
Psychotherapy Group Note    Telemedicine Visit: The patient's condition can be safely assessed and treated via synchronous audio and visual telemedicine encounter.      Reason for Telemedicine Visit: Services only offered telehealth    Originating Site (Patient Location): Patient's other car    Distant Site (Provider Location): [facility]    Consent:  The patient/guardian has verbally consented to: the potential risks and benefits of telemedicine (video visit) versus in person care; bill my insurance or make self-payment for services provided; and responsibility for payment of non-covered services.     Mode of Communication:  Video Conference via [email or web address]    As the provider I attest to compliance with applicable laws and regulations related to telemedicine.      PATIENT'S NAME: Daniel Thomas  MRN:   [id number]  :   1995  ACCT. NUMBER: [id number]  DATE OF SERVICE: 20  START TIME:  2:00 PM  END TIME:  2:50 PM  FACILITATOR: Kindra Cortez LICSW  TOPIC:  EBP Group: Emotions Management  Adult Mental Health Day Treatment  TRACK: 3B    NUMBER OF PARTICIPANTS: 7    Summary of Group / Topics Discussed:  Emotions Management: Anger: Patients explored and shared personal experiences associated with feelings of anger.  Group explored how these feelings develop, what they mean to each individual, and how to increase acceptance and usefulness of these feelings.  Discussed anger as a  secondary  emotion and reviewed ways to manage anger and challenge associated cognitive distortions. Group members worked to contextualize these concepts and promote healing.     Patient Session Goals / Objectives:    Discuss and review definitions and personal views/experiences with anger    Explore how feelings of anger impact functioning    Understand and practice strategies to manage difficult emotions and move towards healing    Demonstrate understanding of the feelings of  anger    Verbalize how these emotions have impacted their lives/functioning    Verbalize of knowledge gained and possible interventions to manage feelings      Patient Participation / Response:  Moderately participated, sharing some personal reflections / insights and adequately adequately received / provided feedback with other participants.    Demonstrated understanding of topics discussed through group discussion and participation, Expressed understanding of the relevance / importance of emotions management skills at distressing times in life and Demonstrated understanding and practice strategies to manage difficult emotions and move towards healing    Treatment Plan:  Patient has a current master individualized treatment plan.  See Epic treatment plan for more information.    Kindra Cortez, LICSW

## 2020-04-23 ENCOUNTER — HOSPITAL ENCOUNTER (OUTPATIENT)
Dept: BEHAVIORAL HEALTH | Facility: CLINIC | Age: 25
End: 2020-04-23
Attending: PSYCHIATRY & NEUROLOGY
Payer: COMMERCIAL

## 2020-04-23 PROCEDURE — 90853 GROUP PSYCHOTHERAPY: CPT | Mod: GT | Performed by: COUNSELOR

## 2020-04-23 PROCEDURE — G0177 OPPS/PHP; TRAIN & EDUC SERV: HCPCS | Mod: GT

## 2020-04-23 NOTE — GROUP NOTE
Psychotherapy Group Note    PATIENT'S NAME: Daniel Thomas  MRN:   [id number]  :   1995  ACCT. NUMBER: [id number]  DATE OF SERVICE: 20  START TIME:  3:00 PM  END TIME:  3:50 PM  FACILITATOR: Bella Lagunas LPCC  TOPIC: MH EBP Group: Emotions Management  Adult Mental Health Day Treatment  TRACK: 3b    NUMBER OF PARTICIPANTS: 8    Telemedicine Visit: The patient's condition can be safely assessed and treated via synchronous audio and visual telemedicine encounter.      Reason for Telemedicine Visit: Services only offered telehealth    Originating Site (Patient Location): Patient's home    Distant Site (Provider Location): Provider Remote Setting    Consent:  The patient/guardian has verbally consented to: the potential risks and benefits of telemedicine (video visit) versus in person care; bill my insurance or make self-payment for services provided; and responsibility for payment of non-covered services.     Mode of Communication:  Video Conference via [employer]    As the provider I attest to compliance with applicable laws and regulations related to telemedicine.     Summary of Group / Topics Discussed:  Emotions Management: Opposite to Emotion: Patients discussed past and present struggles with knowing how to make changes in their lives due to difficult emotional experiences.  Explored desires to experience and feel less anger, sadness, guilt, and fear.  Reviewed the therapeutic skill of opposite action and patients explored opportunities to use their behaviors as a tool to reduce an emotion that they want to change.     Patient Session Goals / Objectives:    Review DBT concepts and focus on patient s experiences of distress and difficult emotional experiences.    Learn how to do the opposite of what an emotion makes us want to do in an effort to decrease an unwanted emotional experience.    Demonstrate understanding of the skill of opposite action by sharing experiences where the technique  could be useful in past / present situations.      Patient Participation / Response:  Fully participated with the group by sharing personal reflections / insights and openly received / provided feedback with other participants.    Demonstrated understanding of topics discussed through group discussion and participation, Expressed understanding of the relevance / importance of emotions management skills at distressing times in life and Demonstrated understanding and practice strategies to manage difficult emotions and move towards healing    Treatment Plan:  Patient has a current master individualized treatment plan.  See Epic treatment plan for more information.    Bella Lagunas, [facility]

## 2020-04-23 NOTE — GROUP NOTE
Psychoeducation Group Note    PATIENT'S NAME: Daniel Thomas  MRN:   [id number]  :   1995  ACCT. NUMBER: [id number]  DATE OF SERVICE: 20  START TIME:  2:00 PM  END TIME:  2:50 PM  FACILITATOR: Arsalan Galloway OTR/L  TOPIC: MH Life Skills Group: Lifestyle Balance and Structure  Telemedicine Visit: The patient's condition can be safely assessed and treated via synchronous audio and visual telemedicine encounter.      Reason for Telemedicine Visit:  COVID-19    Originating Site (Patient Location): Patient's home    Distant Site (Provider Location): [facility]: [facility]    Consent:  The patient/guardian has verbally consented to: the potential risks and benefits of telemedicine (video visit) versus in person care; bill my insurance or make self-payment for services provided; and responsibility for payment of non-covered services.     Mode of Communication:  Video Conference via [employer]    As the provider I attest to compliance with applicable laws and regulations related to telemedicine.     Adult Mental Health Day Treatment  TRACK: 3B    NUMBER OF PARTICIPANTS: 8    Summary of Group / Topics Discussed:  Lifestyle Balance and Strucure:  Occupations: A Balanced Lifestyle: Patients were introduced to the importance of daily occupations in support of mental health management by exploring a balanced lifestyle.  Patients identified how they spend their time and skills to bring this into better balance.  Patients were assisted to establish, restore, and/or modify performance skills and patterns for improved engagement and promotion of positive mental health through meaningful occupations.  Patients gained awareness of the connection between who they are (self identity) with what they do.    Patient Session Goals / Objectives:    Increased awareness of how patient s functioning in identified meaningful occupations are impacted by their mental health status     Developed  performance skills and performance patterns to enhance occupational engagement through creating a balanced lifestyle    Explored ways to generalize new awareness and skills to their everyday life        Patient Participation / Response:  Fully participated with the group by sharing personal reflections / insights and openly received / provided feedback with other participants.    Patient Presentation: Calm,alert,focused with stable mood and thought process.Patient reports doing well at work but unmotivated to do personal chores. Therapist suggested the skills of opposite action and writing a script or plan for the day to completed chores.    Treatment Plan:  Patient has a current master individualized treatment plan.  See Epic treatment plan for more information.    Arsalan Galloway, OTR/L

## 2020-04-23 NOTE — GROUP NOTE
"Process Group Note    PATIENT'S NAME: Daniel Thomas  MRN:   [id number]  :   1995  ACCT. NUMBER: [id number]  DATE OF SERVICE: 20  START TIME:  3:00 PM  END TIME:  3:50 PM  FACILITATOR: Bella Lagunas LPCC  TOPIC:  Process Group    Diagnoses:  Autism Spectrum Disorder 299.00(F84.0)  Associated with a known medical or genetic condition or environmental factor- per patient  296.33 (F33.2) Major Depressive Disorder, Recurrent Episode, Severe _ and With anxious distress  300.3 (F42) Obsessive Compulsive Disorder- per patient       Adult Mental Health Day Treatment  TRACK: 3B    NUMBER OF PARTICIPANTS: 7    Telemedicine Visit: The patient's condition can be safely assessed and treated via synchronous audio and visual telemedicine encounter.      Reason for Telemedicine Visit: Services only offered telehealth    Originating Site (Patient Location): Patient's home    Distant Site (Provider Location): Provider Remote Setting    Consent:  The patient/guardian has verbally consented to: the potential risks and benefits of telemedicine (video visit) versus in person care; bill my insurance or make self-payment for services provided; and responsibility for payment of non-covered services.     Mode of Communication:  Video Conference via [employer]    As the provider I attest to compliance with applicable laws and regulations related to telemedicine.           Data:    Session content: At the start of this group, patients were invited to check in by identifying themselves, describing their current emotional status, and identifying issues to address in this group.   Area(s) of treatment focus addressed in this session included Symptom Management and Develop Socialization / Interpersonal Relationship Skills.  He reported feeling \"upset.\" He denied any safety concerns. He mentioned that he has 1 more week at his current job and will be accepting a new position. He also mentioned that he started to take a " medication that is hindering his appetite. Writer gently reminded him that even though he may not feel like eating that it is important for his emotions regulation to consume food. He was open to feedback from the group.     Therapeutic Interventions/Treatment Strategies:  Psychotherapist offered support, feedback and validation. Interventions include Coping Skills: Assisted patient in identifying 1-2 healthy distraction skills to reduce overall distress and Emotions Management:  Discussed barriers to emotional regulation and Reviewed opposite action skill.    Assessment:    Patient response:   Patient responded to session by accepting feedback, giving feedback, listening and being attentive    Possible barriers to participation / learning include: and no barriers identified    Health Issues:   None reported       Substance Use Review:   Substance Use: No active concerns identified.    Mental Status/Behavioral Observations  Appearance:   Appropriate   Eye Contact:   Poor  Psychomotor Behavior: Restless   Attitude:   Cooperative   Orientation:   All  Speech   Rate / Production: Stutters Normal    Volume:  Normal   Mood:    Anxious  Depressed   Affect:    Constricted   Thought Content:   Rumination  Thought Form:  Coherent  Logical     Insight:    Fair     Plan:     Safety Plan: No current safety concerns identified.  Recommended that patient call 911 or go to the local ED should there be a change in any of these risk factors.     Barriers to treatment: None identified    Patient Contracts (see media tab):  None    Substance Use: Not addressed in session     Continue or Discharge: Patient will continue in Adult Day Treatment (ADT)  as planned. Patient is likely to benefit from learning and using skills as they work toward the goals identified in their treatment plan.      CLARISSA Reyes  April 23, 2020

## 2020-04-24 NOTE — GROUP NOTE
"Process Group Note    PATIENT'S NAME: Daniel Thomas  MRN:   [id number]  :   1995  ACCT. NUMBER: [id number]  DATE OF SERVICE: 20  START TIME:  1:00 PM  END TIME:  1:50 PM  FACILITATOR: Bella Lagunas LPCC  TOPIC:  Process Group    Diagnoses:  Autism Spectrum Disorder 299.00(F84.0)  Associated with a known medical or genetic condition or environmental factor- per patient  296.33 (F33.2) Major Depressive Disorder, Recurrent Episode, Severe _ and With anxious distress  300.3 (F42) Obsessive Compulsive Disorder- per patient       Adult Mental Health Day Treatment  TRACK: 3B    NUMBER OF PARTICIPANTS: 8    Telemedicine Visit: The patient's condition can be safely assessed and treated via synchronous audio and visual telemedicine encounter.      Reason for Telemedicine Visit: Services only offered telehealth    Originating Site (Patient Location): Patient's home    Distant Site (Provider Location): Provider Remote Setting    Consent:  The patient/guardian has verbally consented to: the potential risks and benefits of telemedicine (video visit) versus in person care; bill my insurance or make self-payment for services provided; and responsibility for payment of non-covered services.     Mode of Communication:  Video Conference via InCast    As the provider I attest to compliance with applicable laws and regulations related to telemedicine.         Data:    Session content: At the start of this group, patients were invited to check in by identifying themselves, describing their current emotional status, and identifying issues to address in this group.   Area(s) of treatment focus addressed in this session included Symptom Management and Personal Safety.  He reported feeling \"good.\" He denied any safety concerns. He went to work before group. Patient mentioned that he ate food yesterday. He declined additional process time.     Therapeutic Interventions/Treatment Strategies:  Psychotherapist offered " support, feedback and validation.    Assessment:    Patient response:   Patient responded to session by accepting feedback, giving feedback and listening    Possible barriers to participation / learning include: and no barriers identified    Health Issues:   None reported       Substance Use Review:   Substance Use: No active concerns identified.    Mental Status/Behavioral Observations  Appearance:   Appropriate   Eye Contact:   Fair   Psychomotor Behavior: Restless   Attitude:   Cooperative  Friendly  Orientation:   All  Speech   Rate / Production: Stutters Normal    Volume:  Normal   Mood:    Anxious  Depressed   Affect:    Constricted   Thought Content:   Clear  Thought Form:  Coherent  Logical     Insight:    Fair     Plan:     Safety Plan: No current safety concerns identified.  Recommended that patient call 911 or go to the local ED should there be a change in any of these risk factors.     Barriers to treatment: None identified    Patient Contracts (see media tab):  None    Substance Use: Not addressed in session     Continue or Discharge: Patient will continue in Adult Day Treatment (ADT)  as planned. Patient is likely to benefit from learning and using skills as they work toward the goals identified in their treatment plan.      Bella Lagunas, [facility]  April 24, 2020

## 2020-04-27 ENCOUNTER — HOSPITAL ENCOUNTER (OUTPATIENT)
Dept: BEHAVIORAL HEALTH | Facility: CLINIC | Age: 25
End: 2020-04-27
Attending: PSYCHIATRY & NEUROLOGY
Payer: COMMERCIAL

## 2020-04-27 PROCEDURE — 90853 GROUP PSYCHOTHERAPY: CPT | Mod: GT

## 2020-04-27 NOTE — GROUP NOTE
"Process Group Note    PATIENT'S NAME: Daniel Thomas  MRN:   [id number]  :   1995  ACCT. NUMBER: [id number]  DATE OF SERVICE: 20  START TIME:  1:00 PM  END TIME:  1:50 PM  FACILITATOR: Reina Brown PsyD  TOPIC:  Process Group    Diagnoses:  Autism Spectrum Disorder 299.00(F84.0)  Associated with a known medical or genetic condition or environmental factor- per patient  296.33 (F33.2) Major Depressive Disorder, Recurrent Episode, Severe _ and With anxious distress  300.3 (F42) Obsessive Compulsive Disorder- per patient     Data:    Session content: At the start of this group, patients were invited to check in by identifying themselves, describing their current emotional status, and identifying issues to address in this group.   Area(s) of treatment focus addressed in this session included Symptom Management, Personal Safety and Community Resources/Discharge Planning.    The patient reported feeling happy today. They set a goal to get out by 6am sometime this week. Skills they thought would be beneficial to reach their goal included setting a routine/schedule. Identified barriers to meeting their goal(s) included \"putzing around\". Regarding safety, the patient denied having safety concerns. They endorsed using substances (alcohol) since the last group. Further, they endorsed taking medications as prescribed. They were proud of themselves for walking into a clean apartment. They declined additional process time but contributed to group discussion and provided feedback and support to peers.    Due to the short duration of process time, patients were educated about ways to increase motivation. The patient was active in the discussion.    Therapeutic Interventions/Treatment Strategies:  Psychotherapist offered support, feedback and validation, provided redirection and reinforced use of skills. Treatment modalities used include Cognitive Behavioral Therapy and psychoeducation. Interventions " include Behavioral Activation: Reinforced benefits/challenges of change process through applying skills to replace unwanted behaviors, Explored how behaviors effect mood and interact with thoughts and feelings and Encouraged strategies to reduce individual procrastination and increase motivation by increasing goal-directed activities to enhance mood and reduce symptoms..    Assessment:    Patient response:   Patient responded to session by accepting feedback and listening    Possible barriers to participation / learning include: tardiness    Health Issues:   None reported       Substance Use Review:   Substance Use: Last use: over weekend (alcohol)    Mental Status/Behavioral Observations  Appearance:   Appropriate   Eye Contact:   Unable to assess via video   Psychomotor Behavior: Normal   Attitude:   Cooperative   Orientation:   All  Speech   Rate / Production: Normal    Volume:  Normal   Mood:    Normal  Affect:    Constricted   Thought Content:   Clear  Thought Form:  Coherent  Logical     Insight:    Good     Plan:     Safety Plan: Recommended that patient call 911 or go to the local ED should there be a change in any of these risk factors.     Barriers to treatment: None identified    Patient Contracts (see media tab):  None    Substance Use: Educated about interaction between MH meds and alcohol; encouraged sobriety     Continue or Discharge: Patient will continue in Adult Day Treatment (ADT)  as planned. Patient is likely to benefit from learning and using skills as they work toward the goals identified in their treatment plan.    Please note patient was only present from 1:37pm to 1:50pm.    Reina Brown PsyD, LP  April 27, 2020

## 2020-04-27 NOTE — GROUP NOTE
Psychotherapy Group Note    PATIENT'S NAME: Daniel Thomas  MRN:   [id number]  :   1995  ACCT. NUMBER: [id number]  DATE OF SERVICE: 20  START TIME:  3:00 PM  END TIME:  3:50 PM  FACILITATOR: Reina Brown PsyD  TOPIC:  EBP Group: Cognitive Restructuring  Adult Mental Health Day Treatment  TRACK: 3B    NUMBER OF PARTICIPANTS: 4    Telemedicine Visit: The patient's condition can be safely assessed and treated via synchronous audio and visual telemedicine encounter.      Reason for Telemedicine Visit:  COVID19    Originating Site (Patient Location): Patient's home    Distant Site (Provider Location): Provider Remote Setting    Consent:  The patient/guardian has verbally consented to: the potential risks and benefits of telemedicine (video visit) versus in person care; bill my insurance or make self-payment for services provided; and responsibility for payment of non-covered services.     Mode of Communication:  Video Conference via [employer]    As the provider I attest to compliance with applicable laws and regulations related to telemedicine.     Summary of Group / Topics Discussed:  Cognitive Restructuring: Distortions: Patients received an overview of how to identify common cognitive distortions. Patients will explore alternatives to cognitive distortions and practice challenging their negative thought patterns. The goal is to help patients target modify ineffective thought patterns.     Patient Session Goals / Objectives:    Familiarized self with ineffective / unhealthy thoughts and how they develop.      Explored impact of ineffective thoughts / distortions on mood and activity    Formulated new neutral/positive alternatives to challenge less helpful / ineffective thoughts.    Practiced and plan to apply in daily life           Patient Participation / Response:  Moderately participated, sharing some personal reflections / insights and adequately adequately received / provided  feedback with other participants.    Demonstrated understanding of topics discussed through group discussion and participation, Expressed understanding of the relationship between behaviors, thoughts, and feelings, Demonstrated knowledge of personal thought patterns and how they impact their mood and behavior. and Identified barriers to changing thought patterns    Treatment Plan:  Patient has a current master individualized treatment plan.  See Epic treatment plan for more information.    Reina Brown PsyD, LP   4/27/20

## 2020-04-28 ENCOUNTER — TELEPHONE (OUTPATIENT)
Dept: BEHAVIORAL HEALTH | Facility: CLINIC | Age: 25
End: 2020-04-28

## 2020-04-29 ENCOUNTER — TELEPHONE (OUTPATIENT)
Dept: BEHAVIORAL HEALTH | Facility: CLINIC | Age: 25
End: 2020-04-29

## 2020-04-29 NOTE — TELEPHONE ENCOUNTER
Patient calls to say that he would like to discharge from ADT. He states that he is feeling much less anxious and depressed, since starting the program and has good aftercare. He also cannot miss any more work. Reviewed discharge plan and let him know that he can return in the future if needed. Discharge instruction sheet will be mailed to him.

## 2020-05-05 ENCOUNTER — MYC MEDICAL ADVICE (OUTPATIENT)
Dept: FAMILY MEDICINE | Facility: CLINIC | Age: 25
End: 2020-05-05

## 2020-05-08 ENCOUNTER — VIRTUAL VISIT (OUTPATIENT)
Dept: FAMILY MEDICINE | Facility: CLINIC | Age: 25
End: 2020-05-08
Payer: COMMERCIAL

## 2020-05-08 DIAGNOSIS — R53.83 OTHER FATIGUE: Primary | ICD-10-CM

## 2020-05-08 PROCEDURE — 99213 OFFICE O/P EST LOW 20 MIN: CPT | Mod: GT | Performed by: FAMILY MEDICINE

## 2020-05-08 NOTE — PROGRESS NOTES
"Daniel Thomas is a 24 year old male who is being evaluated via a billable video visit.      The patient has been notified of following:     \"This video visit will be conducted via a call between you and your physician/provider. We have found that certain health care needs can be provided without the need for an in-person physical exam.  This service lets us provide the care you need with a video conversation.  If a prescription is necessary we can send it directly to your pharmacy.  If lab work is needed we can place an order for that and you can then stop by our lab to have the test done at a later time.    Video visits are billed at different rates depending on your insurance coverage.  Please reach out to your insurance provider with any questions.    If during the course of the call the physician/provider feels a video visit is not appropriate, you will not be charged for this service.\"    Patient has given verbal consent for Video visit? Yes    How would you like to obtain your AVS? Junaid    Patient would like the video invitation sent by: Text to cell phone: [phone]    Will anyone else be joining your video visit? No      Subjective     Daniel Thomas is a 24 year old male who presents to clinic today for the following health issues:    HPI  Fatigue       Duration: one year     Description (location/character/radiation): Patient states that he is constantly tired every day this past year. His mom has thyroid problems so he would like to get his thyroid checked.     Intensity:  mild    Accompanying signs and symptoms: Does not sleep well at night, states he has trouble falling asleep and then wakes up a lot then able to fall back asleep at times. Gets about 4 to 6 hours a sleep at night.     History (similar episodes/previous evaluation): None    Precipitating or alleviating factors: None    Therapies tried and outcome: None.  Tried melatonin with nightmares.     Bedtime: usually 11-12p and up " at 5am  Caffeine: none  Alcohol: 2 per week  Snore: been told that snores, never been told that stops breathing.    Family history: no sleep apnea. Mom has low thyroid.    Working cutting down trees.     Normal diet.    Video Start Time: 2:21 PM      Reviewed and updated as needed this visit by Provider         Review of Systems   ROS COMP: CONSTITUTIONAL: NEGATIVE for fever, chills, change in weight  ENT/MOUTH: NEGATIVE for ear, mouth and throat problems  RESP: NEGATIVE for significant cough or SOB  CV: NEGATIVE for chest pain, palpitations or peripheral edema  ENDOCRINE: NEGATIVE for temperature intolerance, skin/hair changes, POSITIVE  for weight gain and now losing weight again with more active job, NEGATIVE for  constipation, diarrhea, edema  and palpitations      Objective    There were no vitals taken for this visit.  Estimated body mass index is 35.4 kg/m  as calculated from the following:    Height as of 2/25/20: 1.829 m (6').    Weight as of 2/25/20: 118.4 kg (261 lb).  Physical Exam     GENERAL: healthy, alert and no distress  EYES: Eyes grossly normal to inspection, conjunctivae and sclerae normal  NECK: no asymmetry, masses or scars. Non-tender thyroid when patient palpates.  RESP: no audible wheeze, cough, or visible cyanosis.  No visible retractions or increased work of breathing.  Able to speak fully in complete sentences.  NEURO: Cranial nerves grossly intact, mentation intact and speech normal  PSYCH: mentation appears normal, affect normal/bright, judgement and insight intact, normal speech and appearance well-groomed      Diagnostic Test Results:  Labs reviewed in Epic        Assessment & Plan     Daniel was seen today for fatigue.    Diagnoses and all orders for this visit:    Other fatigue: rule out thyroid disorder or anemia  If normal labs then we discussed likely lack of sleep causing fatigue or potentially sleep apnea.  First will work on sleep hygiene in order to fall asleep.  -     **TSH  with free T4 reflex FUTURE anytime; Future  -     Hemoglobin; Future           Patient Instructions     1. Call to schedule a lab only appt for thyroid check [phone].    If normal labs then do work on sleeping better and working toward getting 8 hours of sleep with the info below.  If that is not helpful then we'll need to do some further investigating with a sleep study to look for sleep apnea and they measure the stages and depths of your sleep.    Patient Education     Treating Insomnia     Learning to relax before bedtime can improve your sleep.   Good sleeping habits are a key part of treatment. If needed, some medicines may help you sleep better at first. Making healthy lifestyle changes and learning to relax can improve your sleep. Treating insomnia takes commitment. But trust that your efforts will pay off. Be sure to talk with your healthcare provider before taking any medicine.  Healthy lifestyle  Your lifestyle affects your health and your sleep. Here are some healthy habits:    Keep a regular sleep schedule. Go to bed and get up at the same time each day.    Exercise regularly. It may help you reduce stress. Avoid strenuous exercise for 2 to 4 hours before bedtime.    Avoid or limit naps, especially in the late afternoon.    Use your bed only for sleep and sex.    Don t spend too much time in bed trying to fall asleep. If you can t fall asleep, get up and do something (no electronics) until you become tired and drowsy.    Avoid or limit caffeine and nicotine for up to 6 hours before bedtime. They can keep you awake at night.     Also avoid alcohol for at least 4 to 6 hours before bedtime. It may help you fall asleep at first. But you will have more awakenings during the night. And your sleep will not be restful.  Before bedtime  To sleep better every night, try these tips:    Have a bedtime routine to let your body and mind know when it s time to sleep.    Think of going to bed as relaxing and  enjoyable. Sleep will come sooner.    If your worries don t let you sleep, write them down in a diary. Then close it, and go to bed.    Make sure the room is not too hot or too cold. If it s not dark enough, an eye mask can help. If it s noisy, try using earplugs.    Don't eat a large meal just before bedtime.    Remove noises, bright lights, TVs, cell phones, and computers from your sleeping environment.    Use a comfortable mattress and pillow.  Learn to relax  Stress, anxiety, and body tension may keep you awake at night. To unwind before bedtime, try a warm bath, meditation, or yoga. Also try the following:    Deep breathing. Sit or lie back in a chair. Take a slow, deep breath. Hold it for 5 counts. Then breathe out slowly through your mouth. Keep doing this until you feel relaxed.    Progressive muscle relaxation. Tense and then relax the muscles in your body as you breathe deeply. Start with your feet and work up your body to your neck and face.  Cognitive Behavioral Therapy (CBT)  CBT is the most effective treatment for long-term insomnia. It tries to address the underlying causes of your sleep problems, including your habits and how you think about sleep.  Individual Therapy  Shon Higgins PsyD, ERICH  Insomnia   [place] [facility]    [facility]: [phone]    [facility]: [phone]  [facility]  Visit www.[place].org or call [phone] to find a clinic close to you.  Suggested resources  The resources below may help you relax. This list is for information only. [facility] is not responsible for the quality of services or the actions of any person or organization. There may be a fee to use some of these resources.  Insomnia treatment books  Shira Mind by Destini Mandel and Rochelle Durán (2013)  Overcoming Insomnia by Petey Turner and Destini Mandel (2008)  Quiet Your Mind and Get to Sleep: Solutions to  Insomnia for Those with Depression, Anxiety or Chronic Pain by Destini Mandel and Rochelle Durán (2009)  No More Sleepless Nights by Nadeem June and Hannah Galloway (1996)  Say Shira to Insomnia by Kyaw Aguilar (2009)  The Insomnia Workbook by Anitha Reilly and Saleem Grullon (2009)  The Insomnia Answer by Joon Marquez and Mitchel Lantigua (2006)  Stress management and relaxation books  The Relaxation and Stress Reduction Workbook by Germania Conner, Yanet Herrera and Anthony Betancur (2008)  Stress Management Workbook: Techniques and Self-Assessment Procedures by Kailey Steiner and Valeriy Agustin (1997)  A Mindfulness-Based Stress Reduction Workbook by Carlos Gatica and Denise Obrien (2010)  The Complete Stress Management Workbook by Tyree Ramos, Ambrosio Trejo and Gerardo Holly (1996)  Online programs  www.SHUTi.me (pronounced shut eye). There is a fee for this program.   www.sleepIO.com (pronounced sleep ee oh). There is a fee for this program.  Other online resources  Deep breathing and progressive muscle relaxation (PMR):    http://www.[employer].Pharnext  Meditation:    www.Power.comrantheRadio Physics Solutions.Pharnext    www."MediaQ[employer]Inc"guidedChicorymeditationChicorysite.com  Guided imagery:    http://www.Mysportsbrands    http://Face-Me.Pharnext  (click on  Resource Library,  then choose  Meditation, Relaxation, Guided Imagery )  Apps for your mobile device:    Autogenic Training Progressive Muscle Relaxation by YouEye    Calm: Meditation and Sleep Stories by [employer]ag[employer] Inc.    Insight Timer - Meditation Gonzales by [employer].  This is not a prescription and these resources are optional. You must pay for any costs when using these resources. Please ask your insurance carrier if you can be reimbursed for these resources. If so, you are responsible for sending the needed details to your insurance carrier. These resources may also be tax deductible as medical expenses. Check with your .  Date  Last Reviewed: 5/18/2018  Modifications clinically reviewed by Shon Higgins PsyD, ERICH, [facility], Director of the Insomnia and Behavioral Sleep Health Program, [facility].    6082-1071 [employer]. [address] [place] [address] 15441. All rights reserved. This information is not intended as a substitute for professional medical care. Always follow your healthcare professional's instructions. This information has been modified by your health care provider with permission from the publisher.               Return if symptoms worsen or fail to improve.    Nadeem Hernández MD  [facility]      Video-Visit Details    Type of service:  Video Visit    Video End Time:2:35 PM    Originating Location (pt. Location): Home    Distant Location (provider location):  [facility]     Platform used for Video Visit: Netskope    No follow-ups on file.       Nadeem Hernández MD

## 2020-05-08 NOTE — PATIENT INSTRUCTIONS
1. Call to schedule a lab only appt for thyroid check [phone].    If normal labs then do work on sleeping better and working toward getting 8 hours of sleep with the info below.  If that is not helpful then we'll need to do some further investigating with a sleep study to look for sleep apnea and they measure the stages and depths of your sleep.    Patient Education     Treating Insomnia     Learning to relax before bedtime can improve your sleep.   Good sleeping habits are a key part of treatment. If needed, some medicines may help you sleep better at first. Making healthy lifestyle changes and learning to relax can improve your sleep. Treating insomnia takes commitment. But trust that your efforts will pay off. Be sure to talk with your healthcare provider before taking any medicine.  Healthy lifestyle  Your lifestyle affects your health and your sleep. Here are some healthy habits:    Keep a regular sleep schedule. Go to bed and get up at the same time each day.    Exercise regularly. It may help you reduce stress. Avoid strenuous exercise for 2 to 4 hours before bedtime.    Avoid or limit naps, especially in the late afternoon.    Use your bed only for sleep and sex.    Don t spend too much time in bed trying to fall asleep. If you can t fall asleep, get up and do something (no electronics) until you become tired and drowsy.    Avoid or limit caffeine and nicotine for up to 6 hours before bedtime. They can keep you awake at night.     Also avoid alcohol for at least 4 to 6 hours before bedtime. It may help you fall asleep at first. But you will have more awakenings during the night. And your sleep will not be restful.  Before bedtime  To sleep better every night, try these tips:    Have a bedtime routine to let your body and mind know when it s time to sleep.    Think of going to bed as relaxing and enjoyable. Sleep will come sooner.    If your worries don t let you sleep, write them down in a diary. Then  close it, and go to bed.    Make sure the room is not too hot or too cold. If it s not dark enough, an eye mask can help. If it s noisy, try using earplugs.    Don't eat a large meal just before bedtime.    Remove noises, bright lights, TVs, cell phones, and computers from your sleeping environment.    Use a comfortable mattress and pillow.  Learn to relax  Stress, anxiety, and body tension may keep you awake at night. To unwind before bedtime, try a warm bath, meditation, or yoga. Also try the following:    Deep breathing. Sit or lie back in a chair. Take a slow, deep breath. Hold it for 5 counts. Then breathe out slowly through your mouth. Keep doing this until you feel relaxed.    Progressive muscle relaxation. Tense and then relax the muscles in your body as you breathe deeply. Start with your feet and work up your body to your neck and face.  Cognitive Behavioral Therapy (CBT)  CBT is the most effective treatment for long-term insomnia. It tries to address the underlying causes of your sleep problems, including your habits and how you think about sleep.  Individual Therapy  Shon Higgins PsyD,   Insomnia   [facility]    [facility]: [phone]    [facility]: [phone]  [facility]  Visit [email or web address] or call [phone] to find a clinic close to you.  Suggested resources  The resources below may help you relax. This list is for information only. [facility] is not responsible for the quality of services or the actions of any person or organization. There may be a fee to use some of these resources.  Insomnia treatment books  Shira Mind by Destini Durán (2013)  Overcoming Insomnia by Petey Turner and Destini Mandel (2008)  Quiet Your Mind and Get to Sleep: Solutions to Insomnia for Those with Depression, Anxiety or Chronic Pain by Destini Durán (2009)  No  More Sleepless Nights by Nadeem June and Hannah Galloway (1996)  Say Shira to Insomnia by Kyaw Aguilar (2009)  The Insomnia Workbook by Anitha Reilly and Saleem Grullon (2009)  The Insomnia Answer by Joon Marquez and Mitchel Lantigua (2006)  Stress management and relaxation books  The Relaxation and Stress Reduction Workbook by Germania Conner, Yanet Herrera and Anthony Betancur (2008)  Stress Management Workbook: Techniques and Self-Assessment Procedures by Kailey Steiner and Valeriy Agustin (1997)  A Mindfulness-Based Stress Reduction Workbook by Carlos Gatica and Denise Obrien (2010)  The Complete Stress Management Workbook by Tyree Ramos, Ambrosio Trejo and Gerardo Holly (1996)  Online programs  www.SHUTi.me (pronounced shut eye). There is a fee for this program.   www.sleepIO.com (pronounced sleep ee oh). There is a fee for this program.  Other online resources  Deep breathing and progressive muscle relaxation (PMR):    http://www.[employer].Amakem  Meditation:    www.fragrantheart.Amakem    www.Per[employer] TechnologiesguidedGlintsmeditationGlintssite.Amakem  Guided imagery:    http://www.[employer]    http://Retailigence.Amakem  (click on  Resource Library,  then choose  Meditation, Relaxation, Guided Imagery )  Apps for your mobile device:    Autogenic Training Progressive Muscle Relaxation by [employer]    Calm: Meditation and Sleep Stories by [employer]    Railroad Empire Timer - Meditation Gonzales by [employer].  This is not a prescription and these resources are optional. You must pay for any costs when using these resources. Please ask your insurance carrier if you can be reimbursed for these resources. If so, you are responsible for sending the needed details to your insurance carrier. These resources may also be tax deductible as medical expenses. Check with your .  Date Last Reviewed: 5/18/2018  Modifications clinically reviewed by Shon Higgins PsyD, ERICH, CBSM, Director of the  Insomnia and Behavioral Sleep Health Program, [facility].    8734-8929 [employer]. [address] [place] [address], [place] [address]. All rights reserved. This information is not intended as a substitute for professional medical care. Always follow your healthcare professional's instructions. This information has been modified by your health care provider with permission from the publisher.

## 2020-05-18 ENCOUNTER — VIRTUAL VISIT (OUTPATIENT)
Dept: PSYCHOLOGY | Facility: CLINIC | Age: 25
End: 2020-05-18
Payer: COMMERCIAL

## 2020-05-18 DIAGNOSIS — F33.2 MAJOR DEPRESSIVE DISORDER, RECURRENT EPISODE, SEVERE WITH ANXIOUS DISTRESS (H): Primary | ICD-10-CM

## 2020-05-18 PROCEDURE — 90834 PSYTX W PT 45 MINUTES: CPT | Mod: TEL | Performed by: PSYCHOLOGIST

## 2020-05-28 NOTE — PROGRESS NOTES
Progress Note  Disclaimer: Voice recognition software was used to generate this note. As a result, wrong word or 'sound-a-like' substitutions may have occurred due to the inherent limitations of voice recognition software. There may be errors in the script that have gone undetected. Please consider this when interpreting information found in this chart.     Patient Name: Daniel Thomas  Date: 5/18/2020         Service Type: Individual      Session Start Time: 2:00 PM  session End Time: 2:45 PM     Session Length: 45    Session #: 1  C day treatment intake dated 2/11/2020 by OLLIE Brown for additional information on this client.  Attendees: Client attended alone    Telemedicine Visit: The patient has been notified of the following:    We have found that certain healthcare needs can be provided without the need for a face-to-face visit.  This service lets us provide the care you need with a phone conversation.    I will have full access to your [facility] medical record during this entire phone call.  I will be taking notes for your medical record.    Since this is like an office visit, we will build your insurance company for this service.    There are potential benefits and risks of telephone visits (e.g. limits to patient confidentiality) that differ from in person visits.     Confidentiality still applies for telephone services, and nobody will report the visit.  It is important to be in a quiet, private space that is free of distractions (including cell phone or other devices) during the visit.    If during the course of the call I believe a telephone visit is not appropriate, will you will not be charged for this service.    Consent has been obtained for this service by care team member: Yes      Reason for Telemedicine Visit: Client either lacks the technology to participate in telemedicine or has requested phone visit only.     Originating Site (Patient Location):  Patient's home    Distant Site (Provider Location): Provider Remote Setting    Mode of Communication: Telephone Visit  Consent:  The patient/guardian has verbally consented to: the potential risks and benefits of telemedicine (video visit) versus in person care; bill my insurance or make self-payment for services provided; and responsibility for payment of non-covered services.      Treatment Plan Last Reviewed: 5/18/2020  PHQ-9 / BILLY-7 : See flow sheets    DATA  Interactive Complexity: No  Crisis: No       Progress Since Last Session (Related to Symptoms / Goals / Homework):   Symptoms: First session    Homework: Achieved / completed to satisfaction      Episode of Care Goals: Satisfactory progress - ACTION (Actively working towards change); Intervened by reinforcing change plan / affirming steps taken     Current / Ongoing Stressors and Concerns:   Long-term history of depression, anxiety, Tourette's disorder and OCD, also likely diagnosis of ADHD.  Complaints of handwashing.  Reports day treatment principally focused on suicidal ideation.  Reports he was depressed for a whole month prior to admission.  Much of his depression was centered around difficulties.  Reports good response to duloxetine prescribed by PCP.  Reports he has a safety plan and it is on his refrigerator.  Reports he was diagnosed with ADHD in third grade and attended a special study sheppard throughout high school.  Reports good response to Adderall and other medications that he cannot recall.  He has tried multiple medication trials over the years.  Feels current medications are the best he has had.     Treatment Objective(s) Addressed in This Session:   Establish rapport with client, assess for behavioral activation       Intervention:   Interpersonal Therapy: Establish rapport collaborate on treatment planning        ASSESSMENT: Current Emotional / Mental Status (status of significant symptoms):   Risk status (Self / Other harm or suicidal  ideation)   Patient denies current fears or concerns for personal safety.   Patient denies current or recent suicidal ideation or behaviors.   Patient denies current or recent homicidal ideation or behaviors.   Patient denies current or recent self injurious behavior or ideation.   Patient denies other safety concerns.   Patient reports there has been no change in risk factors since their last session.     Patient reports there has been no change in protective factors since their last session.     Recommended that patient call 911 or go to the local ED should there be a change in any of these risk factors.     Appearance:   Unable to assess    Eye Contact:   Unable to assess    Psychomotor Behavior: Unable to assess    Attitude:   Cooperative    Orientation:   All   Speech    Rate / Production: Normal/ Responsive Normal     Volume:  Soft    Mood:    Normal   Affect:    Appropriate    Thought Content:  Clear    Thought Form:  Coherent  Logical    Insight:    Good      Medication Review:   No changes to current psychiatric medication(s)     Medication Compliance:   Yes     Changes in Health Issues:   None reported     Chemical Use Review:   Substance Use: Chemical use reviewed, no active concerns identified      Tobacco Use: No change in amount of tobacco use since last session.  Patient declined discussion at this time    Diagnosis:  1. Major depressive disorder, recurrent episode, severe with anxious distress (H)        Collateral Reports Completed:   Not Applicable    PLAN: (Patient Tasks / Therapist Tasks / Other)  Patient to review his safety plan, consult with prescribing physician if there are any side effects from his medications.        Wallace Obregon                                                         ______________________________________________________________________    Treatment Plan    Patient's Name: Daniel Thomas  YOB: 1995    Date: 5/18/2020    DSM5 Diagnoses: (Sustained by  DSM5 Criteria Listed Above)  Diagnoses:      Autism Spectrum Disorder 299.00(F84.0)  Associated with a known medical or genetic condition or environmental factor- per patient  296.33 (F33.2) Major Depressive Disorder, Recurrent Episode, Severe _ and With anxious distress  300.3 (F42) Obsessive Compulsive Disorder- per patient          Psychosocial & Contextual Factors:   Housing- He lives in an apartment and it is very noisy and there is some ticking in the walls and it is irritating. Also the landlord comes in a random times. Lives alone in [place]. He fears that someone can get  Into his apartment and has to check and recheck before he leaves - he feels this is part of OCD.     Work- Boss can be inconsistent. 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 His boss has told him that he has bipolar and has mood swings. Has worked there for a 1 1/2 years. A co-worked that he has known for a 1/2 year just committed suicide. He missed the  as he did not hear about it until later.      Relationships- As a child he saw someone who did not watch their hands and this was a triggering event. And since then he has really struggled with flashbacks with that specifcic person and the flashbacks cause severe anxiety. It has been happening from 1x a week to daily for the past 12 or 13 years. He cannot get rid of the flashbacks.  Frequently has to wash his own hands up to 20+ times a day. And takes a shower 2 to 3x a day. This helps temporarily.  Nightmares also about someone not washing hands especially when he sleeps in a bed so the last couple weeks he has been sleeping in a chair.        Referral / Collaboration:  Referral to another professional/service is not indicated at this time..  25  Anticipated number of session or this episode of care: 25    Goals  1. Education- the Biopsychosocial model of depression  a. Client will be able to describe how depression is  effecting them physically, emotionally and socially  2. Behavioral Activation  a. Client will learn to assess their depression on a day to day basis  b. Client will Identify two forms of exercise/activity and engage in them 3 times per week  c. Client will Identify 3 things that make them laugh, and engage in these 5 times per week.  d. Client will Identify 1-2Creative activities or hobbies  and engage in them 2 times per week  e. Client will identify music, movies, books that make them feel good and use them 3-4 times per week  3. Self-care  a. Client will identify 5 things they can do just for themselves  b. Client will take time for quiet, reflection, meditation 5 times per week  c. Client will Learn to set boundaries when appropriate  d. Client will Identify 2 individuals they can call on for support, distraction  4. Assessment of progress  a. Client will engage in assessment of their progress on a regular basis    Patient has reviewed and agreed to the above plan.      Wallace Obregon  May 28, 2020

## 2020-05-29 DIAGNOSIS — F41.9 ANXIETY: ICD-10-CM

## 2020-05-29 RX ORDER — DULOXETIN HYDROCHLORIDE 30 MG/1
CAPSULE, DELAYED RELEASE ORAL
Qty: 60 CAPSULE | Refills: 1 | Status: SHIPPED | OUTPATIENT
Start: 2020-05-29 | End: 2020-07-03

## 2020-06-03 ENCOUNTER — VIRTUAL VISIT (OUTPATIENT)
Dept: PSYCHOLOGY | Facility: CLINIC | Age: 25
End: 2020-06-03
Payer: COMMERCIAL

## 2020-06-03 DIAGNOSIS — F33.2 MAJOR DEPRESSIVE DISORDER, RECURRENT EPISODE, SEVERE WITH ANXIOUS DISTRESS (H): Primary | ICD-10-CM

## 2020-06-03 PROCEDURE — 90834 PSYTX W PT 45 MINUTES: CPT | Mod: GT | Performed by: PSYCHOLOGIST

## 2020-06-11 NOTE — PROGRESS NOTES
Progress Note  Disclaimer: Voice recognition software was used to generate this note. As a result, wrong word or 'sound-a-like' substitutions may have occurred due to the inherent limitations of voice recognition software. There may be errors in the script that have gone undetected. Please consider this when interpreting information found in this chart.     Patient Name: Daniel Thomas  Date: 6/3/2020       Service Type: Individual      Session Start Time: 4:00 PM  session End Time: 4:45 PM   Session Length: 45    Session #: 3   C day treatment intake dated 2/11/2020 by OLLIE Brown for additional information on this client.  Attendees: Client attended alone    Telemedicine Visit: The patient's condition can be safely assessed and treated via synchronous audio and visual telemedicine encounter.      Reason for Telemedicine Visit: Patient convenience (e.g. access to timely appointments / distance to available provider)    Originating Site (Patient Location): Patient's home    Distant Site (Provider Location): Provider Remote Setting    Consent:  The patient/guardian has verbally consented to: the potential risks and benefits of telemedicine (video visit) versus in person care; bill my insurance or make self-payment for services provided; and responsibility for payment of non-covered services.     Mode of Communication:  Video Conference via [employer]    As the provider I attest to compliance with applicable laws and regulations related to telemedicine.         Treatment Plan Last Reviewed: 5/18/2020  PHQ-9 / BILLY-7 : See flow sheets    DATA  Interactive Complexity: No  Crisis: No       Progress Since Last Session (Related to Symptoms / Goals / Homework):   Symptoms: Worsening Recent protests and other local events have increased his overall level of anxiety    Homework: Achieved / completed to satisfaction      Episode of Care Goals: Satisfactory progress - ACTION  (Actively working towards change); Intervened by reinforcing change plan / affirming steps taken     Current / Ongoing Stressors and Concerns:   Client reports a general uptake in anxiety due to the world situation regarding protests.  There were some cities but in the mornings so he sees a lot of places that have been boarded up but has no real worries for his own safety.  Notes his OCD has gotten a little worse and his tics are a little more visible.  OCD is a little worse due to concerns about contamination due to COVID-19.  He is still washing his hands to the point of chapping.  He writes down everything he touches he does manage to distract himself sometimes with games on his phone.  He tries to focus on the task in front of him.     Treatment Objective(s) Addressed in This Session:   Establish rapport with client, assess for behavioral activation       Intervention:   Interpersonal Therapy: Establish rapport collaborate on treatment planning        ASSESSMENT: Current Emotional / Mental Status (status of significant symptoms):   Risk status (Self / Other harm or suicidal ideation)   Patient denies current fears or concerns for personal safety.   Patient denies current or recent suicidal ideation or behaviors.   Patient denies current or recent homicidal ideation or behaviors.   Patient denies current or recent self injurious behavior or ideation.   Patient denies other safety concerns.   Patient reports there has been no change in risk factors since their last session.     Patient reports there has been no change in protective factors since their last session.     Recommended that patient call 911 or go to the local ED should there be a change in any of these risk factors.     Appearance:   Appropriate    Eye Contact:   Fair    Psychomotor Behavior: Restless    Attitude:   Cooperative    Orientation:   All   Speech    Rate / Production: Normal/ Responsive Normal     Volume:  Soft     Mood:    Normal   Affect:    Appropriate    Thought Content:  Clear    Thought Form:  Coherent  Logical    Insight:    Good      Medication Review:   No changes to current psychiatric medication(s)     Medication Compliance:   Yes     Changes in Health Issues:   None reported     Chemical Use Review:   Substance Use: Chemical use reviewed, no active concerns identified      Tobacco Use: No change in amount of tobacco use since last session.  Patient declined discussion at this time    Diagnosis:  1. Major depressive disorder, recurrent episode, severe with anxious distress (H)        Collateral Reports Completed:   Not Applicable    PLAN: (Patient Tasks / Therapist Tasks / Other)  Patient to review his safety plan, consult with prescribing physician if there are any side effects from his medications.        Wallace Obregon                                                         ______________________________________________________________________    Treatment Plan    Patient's Name: Daniel Thomas  YOB: 1995    Date: 5/18/2020    DSM5 Diagnoses: (Sustained by DSM5 Criteria Listed Above)  Diagnoses:      Autism Spectrum Disorder 299.00(F84.0)  Associated with a known medical or genetic condition or environmental factor- per patient  296.33 (F33.2) Major Depressive Disorder, Recurrent Episode, Severe _ and With anxious distress  300.3 (F42) Obsessive Compulsive Disorder- per patient          Psychosocial & Contextual Factors:   Housing- He lives in an apartment and it is very noisy and there is some ticking in the walls and it is irritating. Also the landlord comes in a random times. Lives alone in [place]. He fears that someone can get  Into his apartment and has to check and recheck before he leaves - he feels this is part of OCD.     Work- Boss can be inconsistent. 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 His boss has told him that  he has bipolar and has mood swings. Has worked there for a 1 1/2 years. A co-worked that he has known for a 1/2 year just committed suicide. He missed the  as he did not hear about it until later.      Relationships- As a child he saw someone who did not watch their hands and this was a triggering event. And since then he has really struggled with flashbacks with that specifcic person and the flashbacks cause severe anxiety. It has been happening from 1x a week to daily for the past 12 or 13 years. He cannot get rid of the flashbacks.  Frequently has to wash his own hands up to 20+ times a day. And takes a shower 2 to 3x a day. This helps temporarily.  Nightmares also about someone not washing hands especially when he sleeps in a bed so the last couple weeks he has been sleeping in a chair.        Referral / Collaboration:  Referral to another professional/service is not indicated at this time..  25  Anticipated number of session or this episode of care: 25    Goals  1. Education- the Biopsychosocial model of depression  a. Client will be able to describe how depression is effecting them physically, emotionally and socially  2. Behavioral Activation  a. Client will learn to assess their depression on a day to day basis  b. Client will Identify two forms of exercise/activity and engage in them 3 times per week  c. Client will Identify 3 things that make them laugh, and engage in these 5 times per week.  d. Client will Identify 1-2Creative activities or hobbies  and engage in them 2 times per week  e. Client will identify music, movies, books that make them feel good and use them 3-4 times per week  3. Self-care  a. Client will identify 5 things they can do just for themselves  b. Client will take time for quiet, reflection, meditation 5 times per week  c. Client will Learn to set boundaries when appropriate  d. Client will Identify 2 individuals they can call on for support, distraction  4. Assessment of  progress  a. Client will engage in assessment of their progress on a regular basis    Patient has reviewed and agreed to the above plan.      Wallace Obregon  May 28, 2020

## 2020-06-17 ENCOUNTER — MYC REFILL (OUTPATIENT)
Dept: FAMILY MEDICINE | Facility: CLINIC | Age: 25
End: 2020-06-17

## 2020-06-17 DIAGNOSIS — F90.2 ATTENTION DEFICIT HYPERACTIVITY DISORDER (ADHD), COMBINED TYPE: ICD-10-CM

## 2020-06-17 DIAGNOSIS — F41.9 ANXIETY: ICD-10-CM

## 2020-06-17 RX ORDER — DEXTROAMPHETAMINE SACCHARATE, AMPHETAMINE ASPARTATE, DEXTROAMPHETAMINE SULFATE AND AMPHETAMINE SULFATE 2.5; 2.5; 2.5; 2.5 MG/1; MG/1; MG/1; MG/1
10 TABLET ORAL 2 TIMES DAILY
Qty: 60 TABLET | Refills: 0 | OUTPATIENT
Start: 2020-06-17

## 2020-06-17 RX ORDER — DULOXETIN HYDROCHLORIDE 30 MG/1
CAPSULE, DELAYED RELEASE ORAL
Qty: 60 CAPSULE | Refills: 1 | OUTPATIENT
Start: 2020-06-17

## 2020-06-18 ENCOUNTER — VIRTUAL VISIT (OUTPATIENT)
Dept: PSYCHOLOGY | Facility: CLINIC | Age: 25
End: 2020-06-18
Payer: COMMERCIAL

## 2020-06-18 DIAGNOSIS — F33.2 MAJOR DEPRESSIVE DISORDER, RECURRENT EPISODE, SEVERE WITH ANXIOUS DISTRESS (H): Primary | ICD-10-CM

## 2020-06-18 PROCEDURE — 99207 ZZC NO CHARGE VISIT/PATIENT NOT SEEN: CPT | Mod: GT | Performed by: PSYCHOLOGIST

## 2020-06-18 NOTE — TELEPHONE ENCOUNTER
MyChart message sent to pt that he needs to schedule an appt to discuss refills.    Carrol ALLEN RN, BSN

## 2020-07-03 ENCOUNTER — VIRTUAL VISIT (OUTPATIENT)
Dept: FAMILY MEDICINE | Facility: CLINIC | Age: 25
End: 2020-07-03
Payer: COMMERCIAL

## 2020-07-03 DIAGNOSIS — F41.9 ANXIETY: ICD-10-CM

## 2020-07-03 DIAGNOSIS — F90.2 ATTENTION DEFICIT HYPERACTIVITY DISORDER (ADHD), COMBINED TYPE: Primary | ICD-10-CM

## 2020-07-03 PROCEDURE — 96127 BRIEF EMOTIONAL/BEHAV ASSMT: CPT | Performed by: FAMILY MEDICINE

## 2020-07-03 PROCEDURE — 99214 OFFICE O/P EST MOD 30 MIN: CPT | Mod: TEL | Performed by: FAMILY MEDICINE

## 2020-07-03 RX ORDER — DEXTROAMPHETAMINE SACCHARATE, AMPHETAMINE ASPARTATE, DEXTROAMPHETAMINE SULFATE AND AMPHETAMINE SULFATE 2.5; 2.5; 2.5; 2.5 MG/1; MG/1; MG/1; MG/1
10 TABLET ORAL 2 TIMES DAILY
Qty: 60 TABLET | Refills: 0 | Status: SHIPPED | OUTPATIENT
Start: 2020-07-08 | End: 2020-08-07

## 2020-07-03 RX ORDER — DEXTROAMPHETAMINE SACCHARATE, AMPHETAMINE ASPARTATE, DEXTROAMPHETAMINE SULFATE AND AMPHETAMINE SULFATE 2.5; 2.5; 2.5; 2.5 MG/1; MG/1; MG/1; MG/1
10 TABLET ORAL 2 TIMES DAILY
Qty: 60 TABLET | Refills: 0 | Status: SHIPPED | OUTPATIENT
Start: 2020-08-07 | End: 2020-09-06

## 2020-07-03 RX ORDER — DEXTROAMPHETAMINE SACCHARATE, AMPHETAMINE ASPARTATE, DEXTROAMPHETAMINE SULFATE AND AMPHETAMINE SULFATE 2.5; 2.5; 2.5; 2.5 MG/1; MG/1; MG/1; MG/1
10 TABLET ORAL 2 TIMES DAILY
Qty: 60 TABLET | Refills: 0 | Status: SHIPPED | OUTPATIENT
Start: 2020-09-06 | End: 2020-10-06

## 2020-07-03 RX ORDER — DULOXETIN HYDROCHLORIDE 30 MG/1
90 CAPSULE, DELAYED RELEASE ORAL DAILY
Qty: 90 CAPSULE | Refills: 1 | Status: SHIPPED | OUTPATIENT
Start: 2020-07-03 | End: 2020-09-08

## 2020-07-03 ASSESSMENT — ANXIETY QUESTIONNAIRES
2. NOT BEING ABLE TO STOP OR CONTROL WORRYING: NEARLY EVERY DAY
3. WORRYING TOO MUCH ABOUT DIFFERENT THINGS: NEARLY EVERY DAY
IF YOU CHECKED OFF ANY PROBLEMS ON THIS QUESTIONNAIRE, HOW DIFFICULT HAVE THESE PROBLEMS MADE IT FOR YOU TO DO YOUR WORK, TAKE CARE OF THINGS AT HOME, OR GET ALONG WITH OTHER PEOPLE: EXTREMELY DIFFICULT
1. FEELING NERVOUS, ANXIOUS, OR ON EDGE: MORE THAN HALF THE DAYS
GAD7 TOTAL SCORE: 20
6. BECOMING EASILY ANNOYED OR IRRITABLE: NEARLY EVERY DAY
5. BEING SO RESTLESS THAT IT IS HARD TO SIT STILL: NEARLY EVERY DAY
7. FEELING AFRAID AS IF SOMETHING AWFUL MIGHT HAPPEN: NEARLY EVERY DAY

## 2020-07-03 ASSESSMENT — PATIENT HEALTH QUESTIONNAIRE - PHQ9
5. POOR APPETITE OR OVEREATING: NEARLY EVERY DAY
SUM OF ALL RESPONSES TO PHQ QUESTIONS 1-9: 13

## 2020-07-03 NOTE — PROGRESS NOTES
"Daniel Thomas is a 24 year old male who is being evaluated via a billable telephone visit.      The patient has been notified of following:     \"This telephone visit will be conducted via a call between you and your physician/provider. We have found that certain health care needs can be provided without the need for a physical exam.  This service lets us provide the care you need with a short phone conversation.  If a prescription is necessary we can send it directly to your pharmacy.  If lab work is needed we can place an order for that and you can then stop by our lab to have the test done at a later time.    Telephone visits are billed at different rates depending on your insurance coverage. During this emergency period, for some insurers they may be billed the same as an in-person visit.  Please reach out to your insurance provider with any questions.    If during the course of the call the physician/provider feels a telephone visit is not appropriate, you will not be charged for this service.\"    Patient has given verbal consent for Telephone visit?  Yes    What phone number would you like to be contacted at? [phone]    How would you like to obtain your AVS? Junaid Kolb     Daniel Thomas is a 24 year old male who presents via phone visit today for the following health issues:    HPI  Medication Followup of amphetamine-dextroamphetamine (ADDERALL) 10 MG tablet     Taking Medication as prescribed: yes    Side Effects:  None    Medication Helping Symptoms:  yes     Medication Followup of DULoxetine (CYMBALTA) 30 MG capsule     Taking Medication as prescribed: yes    Side Effects:  None    Medication Helping Symptoms:  yes        Depression and Anxiety Follow-Up    How are you doing with your depression since your last visit? Improved     How are you doing with your anxiety since your last visit?  Improved     Are you having other symptoms that might be associated with depression or " anxiety? No    Have you had a significant life event? No     Do you have any concerns with your use of alcohol or other drugs? No  Therapy with Wallace Obregon going well.    ADHD diagnosed in 3rd grade and rediagnosed after high school when also did autism testing. Better motivation and focus with Adderall.    Started Cymbalta 2-3 years ago with Dr. Greenwood, helpful at first and then off the Adderall too since 2 years ago too. Zoloft, citalopram in past when younger but often forgot medication so didn't seem to help.        No history of Lisa, no visual or auditory hallucinations.  OCD symptoms and thinking improving with hand washing worries.   No thoughts of paranoia.     Caffeine: 1 cans of pop every 1-2 days and energy drink once a week.  Alcohol: 5 drinks a week  Vapin nicotine   Drug/THC: None     Working for [place] [employer]    Social History     Tobacco Use     Smoking status: Former Smoker     Packs/day: 0.00     Types: Cigarettes     Last attempt to quit: 12/10/2017     Years since quittin.5     Smokeless tobacco: Never Used     Tobacco comment: patient vapes    Substance Use Topics     Alcohol use: Yes     Comment: socially      Drug use: No     PHQ 2020 2020 7/3/2020   PHQ-9 Total Score 21 17 13   Q9: Thoughts of better off dead/self-harm past 2 weeks More than half the days Several days Not at all     BILLY-7 SCORE 2020 2020 7/3/2020   Total Score 18 15 20     Last PHQ-9 7/3/2020   1.  Little interest or pleasure in doing things 0   2.  Feeling down, depressed, or hopeless 1   3.  Trouble falling or staying asleep, or sleeping too much 3   4.  Feeling tired or having little energy 3   5.  Poor appetite or overeating 3   6.  Feeling bad about yourself 0   7.  Trouble concentrating 2   8.  Moving slowly or restless 1   Q9: Thoughts of better off dead/self-harm past 2 weeks 0   PHQ-9 Total Score 13   Difficulty at work, home, or with people Very difficult     BILLY-7  7/3/2020   1. Feeling  nervous, anxious, or on edge 2   2. Not being able to stop or control worrying 3   3. Worrying too much about different things 3   4. Trouble relaxing 3   5. Being so restless that it is hard to sit still 3   6. Becoming easily annoyed or irritable 3   7. Feeling afraid, as if something awful might happen 3   BILLY-7 Total Score 20   If you checked any problems, how difficult have they made it for you to do your work, take care of things at home, or get along with other people? Extremely difficult     In the past two weeks have you had thoughts of suicide or self-harm?  No.    Do you have concerns about your personal safety or the safety of others?   No    Suicide Assessment Five-step Evaluation and Treatment (SAFE-T)           Reviewed and updated as needed this visit by Provider         Review of Systems   Constitutional, HEENT, cardiovascular, pulmonary, gi and gu systems are negative, except as otherwise noted.       Objective   Reported vitals:  There were no vitals taken for this visit.   healthy, alert and no distress  PSYCH: Alert and oriented times 3; coherent speech, normal   rate and volume, able to articulate logical thoughts, able   to abstract reason, no tangential thoughts, no hallucinations   or delusions  His affect is normal and pleasant  RESP: No cough, no audible wheezing, able to talk in full sentences  Remainder of exam unable to be completed due to telephone visits    Diagnostic Test Results:  Labs reviewed in Epic        Assessment/Plan:  1. Attention deficit hyperactivity disorder (ADHD), combined type  Stable, refill  - amphetamine-dextroamphetamine (ADDERALL) 10 MG tablet; Take 1 tablet (10 mg) by mouth 2 times daily  Dispense: 60 tablet; Refill: 0  - amphetamine-dextroamphetamine (ADDERALL) 10 MG tablet; Take 1 tablet (10 mg) by mouth 2 times daily  Dispense: 60 tablet; Refill: 0  - amphetamine-dextroamphetamine (ADDERALL) 10 MG tablet; Take 1 tablet (10 mg) by mouth 2 times daily   Dispense: 60 tablet; Refill: 0    2. Anxiety  Improving, would like to see more improvement, plan to increase cymbalta up to 90mg from 60mg and recheck in 5 weeks.  - DULoxetine (CYMBALTA) 30 MG capsule; Take 3 capsules (90 mg) by mouth daily  Dispense: 90 capsule; Refill: 1    No follow-ups on file.      Phone call duration:  14 minutes    Nadeem Hernández MD

## 2020-07-03 NOTE — PATIENT INSTRUCTIONS
Increase cymbalta to 90mg daily (take 30mg 3 pills total)  If all goes well check in with phone appt in 5 weeks.  If not going well then please call clinic.    I sent ADHD medication refills for 3 months to your pharmacy.

## 2020-07-04 ASSESSMENT — ANXIETY QUESTIONNAIRES: GAD7 TOTAL SCORE: 20

## 2020-07-06 ENCOUNTER — VIRTUAL VISIT (OUTPATIENT)
Dept: PSYCHOLOGY | Facility: CLINIC | Age: 25
End: 2020-07-06
Payer: COMMERCIAL

## 2020-07-06 DIAGNOSIS — F33.2 MAJOR DEPRESSIVE DISORDER, RECURRENT EPISODE, SEVERE WITH ANXIOUS DISTRESS (H): Primary | ICD-10-CM

## 2020-07-06 PROCEDURE — 90834 PSYTX W PT 45 MINUTES: CPT | Mod: GT | Performed by: PSYCHOLOGIST

## 2020-07-15 NOTE — PROGRESS NOTES
Progress Note  Disclaimer: Voice recognition software was used to generate this note. As a result, wrong word or 'sound-a-like' substitutions may have occurred due to the inherent limitations of voice recognition software. There may be errors in the script that have gone undetected. Please consider this when interpreting information found in this chart.     Patient Name: Daniel Thomas  Date: 7/6/2020       Service Type: Individual      Session Start Time: 3:00 PM  session End Time: 3:45 PM   Session Length: 45    Session #: 4   C day treatment intake dated 2/11/2020 by OLLIE Brown for additional information on this client.  Attendees: Client attended alone    Telemedicine Visit: The patient's condition can be safely assessed and treated via synchronous audio and visual telemedicine encounter.      Reason for Telemedicine Visit: Patient convenience (e.g. access to timely appointments / distance to available provider)    Originating Site (Patient Location): Patient's home    Distant Site (Provider Location): Provider Remote Setting    Consent:  The patient/guardian has verbally consented to: the potential risks and benefits of telemedicine (video visit) versus in person care; bill my insurance or make self-payment for services provided; and responsibility for payment of non-covered services.     Mode of Communication:  Video Conference via [employer]    As the provider I attest to compliance with applicable laws and regulations related to telemedicine.         Treatment Plan Last Reviewed: 5/18/2020  PHQ-9 / BILLY-7 : See flow sheets    DATA  Interactive Complexity: No  Crisis: No       Progress Since Last Session (Related to Symptoms / Goals / Homework):   Symptoms: Improving Things are largely back to normal at work.  He has been a little more assertive with people that are disrespecting    Homework: Achieved / completed to satisfaction      Episode of Care Goals:  Satisfactory progress - ACTION (Actively working towards change); Intervened by reinforcing change plan / affirming steps taken     Current / Ongoing Stressors and Concerns:   Client reports he recently got a small fishing boat.  Duloxetine has been increased to 90 which has improved his anxiety.  He is getting a little more sleep.  Also drinking less.  Like to talk about his compulsive handwashing next time.     Treatment Objective(s) Addressed in This Session:   Establish rapport with client, assess for behavioral activation       Intervention:   Interpersonal Therapy: Establish rapport collaborate on treatment planning        ASSESSMENT: Current Emotional / Mental Status (status of significant symptoms):   Risk status (Self / Other harm or suicidal ideation)   Patient denies current fears or concerns for personal safety.   Patient denies current or recent suicidal ideation or behaviors.   Patient denies current or recent homicidal ideation or behaviors.   Patient denies current or recent self injurious behavior or ideation.   Patient denies other safety concerns.   Patient reports there has been no change in risk factors since their last session.     Patient reports there has been no change in protective factors since their last session.     Recommended that patient call 911 or go to the local ED should there be a change in any of these risk factors.     Appearance:   Appropriate    Eye Contact:   Fair    Psychomotor Behavior: Restless    Attitude:   Cooperative    Orientation:   All   Speech    Rate / Production: Normal/ Responsive Normal     Volume:  Soft    Mood:    Normal   Affect:    Appropriate    Thought Content:  Clear    Thought Form:  Coherent  Logical    Insight:    Good      Medication Review:   No changes to current psychiatric medication(s)     Medication Compliance:   Yes     Changes in Health Issues:   None reported     Chemical Use Review:   Substance Use: Chemical use reviewed, no active  concerns identified      Tobacco Use: No change in amount of tobacco use since last session.  Patient declined discussion at this time    Diagnosis:  1. Major depressive disorder, recurrent episode, severe with anxious distress (H)        Collateral Reports Completed:   Not Applicable    PLAN: (Patient Tasks / Therapist Tasks / Other)  Patient to review his safety plan, consult with prescribing physician if there are any side effects from his medications.        Wallace AGUIRREJailyn Obregon                                                         ______________________________________________________________________    Treatment Plan    Patient's Name: Daniel Thomas  YOB: 1995    Date: 2020    DSM5 Diagnoses: (Sustained by DSM5 Criteria Listed Above)  Diagnoses:      Autism Spectrum Disorder 299.00(F84.0)  Associated with a known medical or genetic condition or environmental factor- per patient  296.33 (F33.2) Major Depressive Disorder, Recurrent Episode, Severe _ and With anxious distress  300.3 (F42) Obsessive Compulsive Disorder- per patient          Psychosocial & Contextual Factors:   Housing- He lives in an apartment and it is very noisy and there is some ticking in the walls and it is irritating. Also the landlord comes in a random times. Lives alone in [place]. He fears that someone can get  Into his apartment and has to check and recheck before he leaves - he feels this is part of OCD.     Work- Boss can be inconsistent. 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 His boss has told him that he has bipolar and has mood swings. Has worked there for a 1 1/2 years. A co-worked that he has known for a 1/2 year just committed suicide. He missed the  as he did not hear about it until later.      Relationships- As a child he saw someone who did not watch their hands and this was a triggering event. And since then he has really struggled  with flashbacks with that specifcic person and the flashbacks cause severe anxiety. It has been happening from 1x a week to daily for the past 12 or 13 years. He cannot get rid of the flashbacks.  Frequently has to wash his own hands up to 20+ times a day. And takes a shower 2 to 3x a day. This helps temporarily.  Nightmares also about someone not washing hands especially when he sleeps in a bed so the last couple weeks he has been sleeping in a chair.        Referral / Collaboration:  Referral to another professional/service is not indicated at this time..  25  Anticipated number of session or this episode of care: 25    Goals  1. Education- the Biopsychosocial model of depression  a. Client will be able to describe how depression is effecting them physically, emotionally and socially  2. Behavioral Activation  a. Client will learn to assess their depression on a day to day basis  b. Client will Identify two forms of exercise/activity and engage in them 3 times per week  c. Client will Identify 3 things that make them laugh, and engage in these 5 times per week.  d. Client will Identify 1-2Creative activities or hobbies  and engage in them 2 times per week  e. Client will identify music, movies, books that make them feel good and use them 3-4 times per week  3. Self-care  a. Client will identify 5 things they can do just for themselves  b. Client will take time for quiet, reflection, meditation 5 times per week  c. Client will Learn to set boundaries when appropriate  d. Client will Identify 2 individuals they can call on for support, distraction  4. Assessment of progress  a. Client will engage in assessment of their progress on a regular basis    Patient has reviewed and agreed to the above plan.      Wallace Obregon  May 28, 2020

## 2020-07-23 ENCOUNTER — VIRTUAL VISIT (OUTPATIENT)
Dept: PSYCHOLOGY | Facility: CLINIC | Age: 25
End: 2020-07-23
Payer: COMMERCIAL

## 2020-07-23 DIAGNOSIS — F33.2 MAJOR DEPRESSIVE DISORDER, RECURRENT EPISODE, SEVERE WITH ANXIOUS DISTRESS (H): Primary | ICD-10-CM

## 2020-07-23 PROCEDURE — 99207 ZZC NO BILLABLE SERVICE THIS VISIT: CPT | Performed by: PSYCHOLOGIST

## 2020-08-06 ENCOUNTER — VIRTUAL VISIT (OUTPATIENT)
Dept: PSYCHOLOGY | Facility: CLINIC | Age: 25
End: 2020-08-06
Payer: COMMERCIAL

## 2020-08-06 DIAGNOSIS — F33.2 MAJOR DEPRESSIVE DISORDER, RECURRENT EPISODE, SEVERE WITH ANXIOUS DISTRESS (H): Primary | ICD-10-CM

## 2020-08-06 PROCEDURE — 90834 PSYTX W PT 45 MINUTES: CPT | Mod: GT | Performed by: PSYCHOLOGIST

## 2020-08-06 NOTE — PROGRESS NOTES
Progress Note  Disclaimer: Voice recognition software was used to generate this note. As a result, wrong word or 'sound-a-like' substitutions may have occurred due to the inherent limitations of voice recognition software. There may be errors in the script that have gone undetected. Please consider this when interpreting information found in this chart.     Patient Name: Daniel Thomas  Date: 7/6/2020       Service Type: Individual      Session Start Time: 3:00 PM  session End Time: 3:45 PM   Session Length: 45    Session #: 4   C day treatment intake dated 2/11/2020 by OLLIE Brown for additional information on this client.  Attendees: Client attended alone    Telemedicine Visit: The patient's condition can be safely assessed and treated via synchronous audio and visual telemedicine encounter.      Reason for Telemedicine Visit: Patient convenience (e.g. access to timely appointments / distance to available provider)    Originating Site (Patient Location): Patient's home    Distant Site (Provider Location): Provider Remote Setting    Consent:  The patient/guardian has verbally consented to: the potential risks and benefits of telemedicine (video visit) versus in person care; bill my insurance or make self-payment for services provided; and responsibility for payment of non-covered services.     Mode of Communication:  Video Conference via [employer]    As the provider I attest to compliance with applicable laws and regulations related to telemedicine.         Treatment Plan Last Reviewed: 5/18/2020  PHQ-9 / BILLY-7 : See flow sheets    DATA  Interactive Complexity: No  Crisis: No       Progress Since Last Session (Related to Symptoms / Goals / Homework):   Symptoms: Improving Things are largely back to normal at work.  He has been a little more assertive with people that are disrespecting    Homework: Achieved / completed to satisfaction      Episode of Care Goals:  Satisfactory progress - ACTION (Actively working towards change); Intervened by reinforcing change plan / affirming steps taken     Current / Ongoing Stressors and Concerns:   Client reports he recently got a small fishing boat.  Duloxetine has been increased to 90 which has improved his anxiety.  He is getting a little more sleep.  Also drinking less.  Like to talk about his compulsive handwashing next time.  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 Has a date with a friend of a friend.  Sleep has improved.7hrs now. Consistent on meds. Hasn't been washing his hands as much.   Treatment Objective(s) Addressed in This Session:   Establish rapport with client, assess for behavioral activation       Intervention:   Interpersonal Therapy: Establish rapport collaborate on treatment planning        ASSESSMENT: Current Emotional / Mental Status (status of significant symptoms):   Risk status (Self / Other harm or suicidal ideation)   Patient denies current fears or concerns for personal safety.   Patient denies current or recent suicidal ideation or behaviors.   Patient denies current or recent homicidal ideation or behaviors.   Patient denies current or recent self injurious behavior or ideation.   Patient denies other safety concerns.   Patient reports there has been no change in risk factors since their last session.     Patient reports there has been no change in protective factors since their last session.     Recommended that patient call 911 or go to the local ED should there be a change in any of these risk factors.     Appearance:   Appropriate    Eye Contact:   Fair    Psychomotor Behavior: Restless    Attitude:   Cooperative    Orientation:   All   Speech    Rate / Production: Normal/ Responsive Normal     Volume:  Soft    Mood:    Normal   Affect:    Appropriate    Thought Content:  Clear    Thought Form:  Coherent  Logical    Insight:    Good      Medication Review:   No changes to current psychiatric  medication(s)     Medication Compliance:   Yes     Changes in Health Issues:   None reported     Chemical Use Review:   Substance Use: Chemical use reviewed, no active concerns identified      Tobacco Use: No change in amount of tobacco use since last session.  Patient declined discussion at this time    Diagnosis:  1. Major depressive disorder, recurrent episode, severe with anxious distress (H)        Collateral Reports Completed:   Not Applicable    PLAN: (Patient Tasks / Therapist Tasks / Other)  Patient to review his safety plan, consult with prescribing physician if there are any side effects from his medications.        Wallace TIMOTHYJailyn Obregon                                                         ______________________________________________________________________    Treatment Plan    Patient's Name: Daniel Thomas  YOB: 1995    Date: 2020    DSM5 Diagnoses: (Sustained by DSM5 Criteria Listed Above)  Diagnoses:      Autism Spectrum Disorder 299.00(F84.0)  Associated with a known medical or genetic condition or environmental factor- per patient  296.33 (F33.2) Major Depressive Disorder, Recurrent Episode, Severe _ and With anxious distress  300.3 (F42) Obsessive Compulsive Disorder- per patient          Psychosocial & Contextual Factors:   Housing- He lives in an apartment and it is very noisy and there is some ticking in the walls and it is irritating. Also the landlord comes in a random times. Lives alone in [place]. He fears that someone can get  Into his apartment and has to check and recheck before he leaves - he feels this is part of OCD.     Work- Boss can be inconsistent. 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 His boss has told him that he has bipolar and has mood swings. Has worked there for a 1 1/2 years. A co-worked that he has known for a 1/2 year just committed suicide. He missed the  as he did not hear about  it until later.      Relationships- As a child he saw someone who did not watsh their hands and this was a triggering event. And since then he has really struggled with flashbacks with that specifcic person and the flashbacks cause severe anxiety. It has been happening from 1x a week to daily for the past 12 or 13 years. He cannot get rid of the flashbacks.  Frequently has to wash his own hands up to 20+ times a day. And takes a shower 2 to 3x a day. This helps temporarily.  Nightmares also about someone not washing hands especially when he sleeps in a bed so the last couple weeks he has been sleeping in a chair.        Referral / Collaboration:  Referral to another professional/service is not indicated at this time..  25  Anticipated number of session or this episode of care: 25    Goals  1. Education- the Biopsychosocial model of depression  a. Client will be able to describe how depression is effecting them physically, emotionally and socially  2. Behavioral Activation  a. Client will learn to assess their depression on a day to day basis  b. Client will Identify two forms of exercise/activity and engage in them 3 times per week  c. Client will Identify 3 things that make them laugh, and engage in these 5 times per week.  d. Client will Identify 1-2Creative activities or hobbies  and engage in them 2 times per week  e. Client will identify music, movies, books that make them feel good and use them 3-4 times per week  3. Self-care  a. Client will identify 5 things they can do just for themselves  b. Client will take time for quiet, reflection, meditation 5 times per week  c. Client will Learn to set boundaries when appropriate  d. Client will Identify 2 individuals they can call on for support, distraction  4. Assessment of progress  a. Client will engage in assessment of their progress on a regular basis    Patient has reviewed and agreed to the above plan.      Wallace Obregon  May 28, 2020

## 2020-08-24 ENCOUNTER — VIRTUAL VISIT (OUTPATIENT)
Dept: PSYCHOLOGY | Facility: CLINIC | Age: 25
End: 2020-08-24
Payer: COMMERCIAL

## 2020-08-24 DIAGNOSIS — F33.2 MAJOR DEPRESSIVE DISORDER, RECURRENT EPISODE, SEVERE WITH ANXIOUS DISTRESS (H): Primary | ICD-10-CM

## 2020-08-24 PROCEDURE — 90834 PSYTX W PT 45 MINUTES: CPT | Mod: GT | Performed by: PSYCHOLOGIST

## 2020-08-24 NOTE — PROGRESS NOTES
Progress Note  Disclaimer: Voice recognition software was used to generate this note. As a result, wrong word or 'sound-a-like' substitutions may have occurred due to the inherent limitations of voice recognition software. There may be errors in the script that have gone undetected. Please consider this when interpreting information found in this chart.     Patient Name: Daniel Thomas  Date: 8/24/2020       Service Type: Individual      Session Start Time: 4:00 PM  session End Time: 4:45 PM   Session Length: 45    Session #: 5  C day treatment intake dated 2/11/2020 by OLLIE Brown for additional information on this client.  Attendees: Client attended alone    Telemedicine Visit: The patient's condition can be safely assessed and treated via synchronous audio and visual telemedicine encounter.      Reason for Telemedicine Visit: Patient convenience (e.g. access to timely appointments / distance to available provider)    Originating Site (Patient Location): Patient's home    Distant Site (Provider Location): Provider Remote Setting    Consent:  The patient/guardian has verbally consented to: the potential risks and benefits of telemedicine (video visit) versus in person care; bill my insurance or make self-payment for services provided; and responsibility for payment of non-covered services.     Mode of Communication:  Video Conference via [employer]    As the provider I attest to compliance with applicable laws and regulations related to telemedicine.         Treatment Plan Last Reviewed: 5/18/2020  PHQ-9 / BILLY-7 : See flow sheets    DATA  Interactive Complexity: No  Crisis: No       Progress Since Last Session (Related to Symptoms / Goals / Homework):   Symptoms: Improving Things are largely back to normal at work.  He has been a little more assertive with people that are disrespecting    Homework: Achieved / completed to satisfaction      Episode of Care Goals:  Satisfactory progress - ACTION (Actively working towards change); Intervened by reinforcing change plan / affirming steps taken     Current / Ongoing Stressors and Concerns:   Client reports he recently got a small fishing boat.  Duloxetine has been increased to 90 which has improved his anxiety.  He is getting a little more sleep.  Also drinking less.  Like to talk about his compulsive handwashing next time.  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 Has a date with a friend of a friend.  Sleep has improved.7hrs now. Consistent on meds. Hasn't been washing his hands as much. Only about 4 times over weekend.  Hung out with friends over weekend. Probably drank too much.     Treatment Objective(s) Addressed in This Session:   Establish rapport with client, assess for behavioral activation       Intervention:   Interpersonal Therapy: Establish rapport collaborate on treatment planning        ASSESSMENT: Current Emotional / Mental Status (status of significant symptoms):   Risk status (Self / Other harm or suicidal ideation)   Patient denies current fears or concerns for personal safety.   Patient denies current or recent suicidal ideation or behaviors.   Patient denies current or recent homicidal ideation or behaviors.   Patient denies current or recent self injurious behavior or ideation.   Patient denies other safety concerns.   Patient reports there has been no change in risk factors since their last session.     Patient reports there has been no change in protective factors since their last session.     Recommended that patient call 911 or go to the local ED should there be a change in any of these risk factors.     Appearance:   Appropriate    Eye Contact:   Fair    Psychomotor Behavior: Restless    Attitude:   Cooperative    Orientation:   All   Speech    Rate / Production: Normal/ Responsive Normal     Volume:  Soft    Mood:    Normal   Affect:    Appropriate    Thought Content:  Clear    Thought Form:  Coherent  Logical     Insight:    Good      Medication Review:   No changes to current psychiatric medication(s)     Medication Compliance:   Yes     Changes in Health Issues:   None reported     Chemical Use Review:   Substance Use: Chemical use reviewed, no active concerns identified      Tobacco Use: No change in amount of tobacco use since last session.  Patient declined discussion at this time     Using CBD for sleep.    Diagnosis:  1. Major depressive disorder, recurrent episode, severe with anxious distress (H)        Collateral Reports Completed:   Not Applicable    PLAN: (Patient Tasks / Therapist Tasks / Other)  Patient to review his safety plan, consult with prescribing physician if there are any side effects from his medications.        Wallace Obregon                                                         ______________________________________________________________________    Treatment Plan    Patient's Name: Daniel Thomas  YOB: 1995    Date: 5/18/2020    DSM5 Diagnoses: (Sustained by DSM5 Criteria Listed Above)  Diagnoses:      Autism Spectrum Disorder 299.00(F84.0)  Associated with a known medical or genetic condition or environmental factor- per patient  296.33 (F33.2) Major Depressive Disorder, Recurrent Episode, Severe _ and With anxious distress  300.3 (F42) Obsessive Compulsive Disorder- per patient          Psychosocial & Contextual Factors:   Housing- He lives in an apartment and it is very noisy and there is some ticking in the walls and it is irritating. Also the landlord comes in a random times. Lives alone in [place]. He fears that someone can get  Into his apartment and has to check and recheck before he leaves - he feels this is part of OCD.     Work- Boss can be inconsistent. 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 His boss has told him that he has bipolar and has mood swings. Has worked there for a 1 1/2 years. [employer]  co-worked that he has known for a 1/2 year just committed suicide. He missed the  as he did not hear about it until later.      Relationships- As a child he saw someone who did not watsh their hands and this was a triggering event. And since then he has really struggled with flashbacks with that specifcic person and the flashbacks cause severe anxiety. It has been happening from 1x a week to daily for the past 12 or 13 years. He cannot get rid of the flashbacks.  Frequently has to wash his own hands up to 20+ times a day. And takes a shower 2 to 3x a day. This helps temporarily.  Nightmares also about someone not washing hands especially when he sleeps in a bed so the last couple weeks he has been sleeping in a chair.        Referral / Collaboration:  Referral to another professional/service is not indicated at this time..  25  Anticipated number of session or this episode of care: 25    Goals  1. Education- the Biopsychosocial model of depression  a. Client will be able to describe how depression is effecting them physically, emotionally and socially  2. Behavioral Activation  a. Client will learn to assess their depression on a day to day basis  b. Client will Identify two forms of exercise/activity and engage in them 3 times per week  c. Client will Identify 3 things that make them laugh, and engage in these 5 times per week.  d. Client will Identify 1-2Creative activities or hobbies  and engage in them 2 times per week  e. Client will identify music, movies, books that make them feel good and use them 3-4 times per week  3. Self-care  a. Client will identify 5 things they can do just for themselves  b. Client will take time for quiet, reflection, meditation 5 times per week  c. Client will Learn to set boundaries when appropriate  d. Client will Identify 2 individuals they can call on for support, distraction  4. Assessment of progress  a. Client will engage in assessment of their progress on a regular  basis    Patient has reviewed and agreed to the above plan.      Wallace Obregon  May 28, 2020

## 2020-08-26 ENCOUNTER — OFFICE VISIT (OUTPATIENT)
Dept: FAMILY MEDICINE | Facility: CLINIC | Age: 25
End: 2020-08-26
Payer: COMMERCIAL

## 2020-08-26 VITALS
DIASTOLIC BLOOD PRESSURE: 62 MMHG | WEIGHT: 235 LBS | TEMPERATURE: 98.3 F | RESPIRATION RATE: 20 BRPM | SYSTOLIC BLOOD PRESSURE: 124 MMHG | HEART RATE: 72 BPM | BODY MASS INDEX: 31.87 KG/M2

## 2020-08-26 DIAGNOSIS — K64.9 HEMORRHOIDS, UNSPECIFIED HEMORRHOID TYPE: Primary | ICD-10-CM

## 2020-08-26 PROCEDURE — 99213 OFFICE O/P EST LOW 20 MIN: CPT | Performed by: NURSE PRACTITIONER

## 2020-08-26 RX ORDER — HYDROCORTISONE 25 MG/G
CREAM TOPICAL 2 TIMES DAILY PRN
Qty: 28 G | Refills: 0 | Status: SHIPPED | OUTPATIENT
Start: 2020-08-26 | End: 2020-09-02

## 2020-08-26 NOTE — NURSING NOTE
Chief Complaint   Patient presents with     Hemorrhoids       Initial /62 (BP Location: Right arm, Patient Position: Chair, Cuff Size: Adult Large)   Pulse 72   Temp 98.3  F (36.8  C) (Tympanic)   Resp 20   Wt 106.6 kg (235 lb)   BMI 31.87 kg/m   Estimated body mass index is 31.87 kg/m  as calculated from the following:    Height as of 2/25/20: 1.829 m (6').    Weight as of this encounter: 106.6 kg (235 lb).    Patient presents to the clinic using No DME    Health Maintenance that is potentially due pending provider review:  NONE    n/a    Is there anyone who you would like to be able to receive your results? No  If yes have patient fill out NANCY Araujo M.A.

## 2020-08-26 NOTE — PROGRESS NOTES
Subjective     Daniel Thomas is a 24 year old male who presents to clinic today for the following health issues:    HPI       Hemorrhoids  Onset/Duration: over a year, though worsening   Description:   Liliana-anal lump: YES  Pain: YES  Itching: no  Accompanying Signs & Symptoms:  Blood in stool: no  Changes in stool pattern: YES  History:   Any previous GI studies done:none  Family History of colon cancer: no  Precipitating factors:   None  Alleviating factors:  None  Therapies tried and outcome: increased fluids and has started to limiting alcohol     Additional provider notes: Has had hemorrhoids for ~1 year. Worsening over the past 1-2 weeks with increased pain with sitting and sensitive to the touch. Has not tried any medications in the past. Has had some constipation and straining with BMs.       Review of Systems   Constitutional: Negative.  Negative for fever.   Gastrointestinal: Positive for constipation (occasional).   Genitourinary:        Hemorrhoid, painful to sit            Objective    /62 (BP Location: Right arm, Patient Position: Chair, Cuff Size: Adult Large)   Pulse 72   Temp 98.3  F (36.8  C) (Tympanic)   Resp 20   Wt 106.6 kg (235 lb)   BMI 31.87 kg/m    Body mass index is 31.87 kg/m .  Physical Exam  Vitals signs and nursing note reviewed.   Constitutional:       General: He is not in acute distress.     Appearance: Normal appearance. He is not ill-appearing or toxic-appearing.   Genitourinary:     Rectum: Tenderness and external hemorrhoid (~1in in diameter, no bleeding) present.   Neurological:      Mental Status: He is alert.                    Assessment & Plan     Hemorrhoids, unspecified hemorrhoid type  Discussed constipation/straining likely cause of his hemorrhoid given discussion with patient. Recommended increase fiber intake, dietary fiber, water (handouts provided for fiber recommendations). Avoid straining during BM. Use moist, gentle cleaning after BM. Steroid cream  prescribed. Side effects, risks and benefits of medication were discussed with patient. If symptoms do not improve with the above, recommended he call back and I will place referral for evaluation and possible surgical removal.     - hydrocortisone, Perianal, (HYDROCORTISONE) 2.5 % cream; Place rectally 2 times daily as needed for hemorrhoids     BMI:   Estimated body mass index is 31.87 kg/m  as calculated from the following:    Height as of 2/25/20: 1.829 m (6').    Weight as of this encounter: 106.6 kg (235 lb).           Patient Instructions     Hemorrhoids:  1. Increase fluid intake.   2. Increase dietary fiber.  3. Avoid straining or spending excessive time at stool.   4. Moist, gentle cleaning following bowel movement. Wipes can be helpful.   5. Use steroid cream as prescribed.       Patient Education     Treating Hemorrhoids: Self-Care    Follow your healthcare provider s advice about caring for your hemorrhoids at home. Some treatments help relieve symptoms right away. Others involve making changes in your diet and exercise habits. These can help ease constipation and prevent hemorrhoid symptoms from coming back.  Relieving symptoms  Your healthcare provider may prescribe anti-inflammatory medicine to help ease your symptoms. The following tips will also help relieve pain and swelling.    Take sitz baths. Taking a sitz bath means sitting in a few inches of warm bath water. Soaking for 10 minutes twice a day can provide welcome relief from painful hemorrhoids. It can also help the area stay clean.    Develop good bowel habits. Use the bathroom when you need to. Don t ignore the urge to move your bowels. This can lead to constipation, hard stools, and straining. Also, don t read while on the toilet. Sit only as long as needed. Wipe gently with soft, unscented toilet tissue or baby wipes.    Use ice packs. Placing an ice pack on a thrombosed external hemorrhoid can help relieve pain right away. It will also  help reduce the blood clot. Use the ice for 15 to 20 minutes at a time. Keep a cloth between the ice and your skin to prevent skin damage.    Use other measures. Laxatives and enemas can help ease constipation. But use them only on your healthcare provider s advice. For symptom relief, try using cotton pads soaked in witch hazel. These are available at most drugstores. Over-the-counter hemorrhoid ointments and petroleum jelly can also provide relief.  Add fiber to your diet  Adding fiber to your diet can help relieve constipation by making stools softer and easier to pass. To increase your fiber intake, your healthcare provider may recommend a bulking agent, such as psyllium. This is a high-fiber supplement available at most grocery stores and drugstores. Eating more fiber-rich foods will also help. There are two types of fiber:    Insoluble fiber is the main ingredient in bulking agents. It s also found in foods such as wheat bran, whole-grain breads, fresh fruits, and vegetables.    Soluble fiber is found in foods such as oat bran. Although soluble fiber is good for you, it may not ease constipation as much as foods high in insoluble fiber.  Drink more water  Along with a high-fiber diet, drinking more water can help ease constipation. This is because insoluble fiber absorbs water, making stools soft and bulky. Be sure to drink plenty of water throughout the day. Drinking fruit juices, such as prune juice or apple juice, can also help prevent constipation.  Get more exercise  Regular exercise aids digestion and helps prevent constipation. It s also great for your health. So talk with your healthcare provider about starting an exercise program. Low-impact activities, such as swimming or walking, are good places to start. Take it easy at first. And remember to drink plenty of water when you exercise.  High-fiber foods  High-fiber foods offer many benefits. By making your stools softer, they help heal and prevent  swollen hemorrhoids. They may also help reduce the risk of colon and rectal cancer. Best of all, they re usually low in calories and taste great. Here are some examples of fiber-rich foods.    Whole grains, such as wheat bran, corn bran, and brown rice.    Vegetables, especially carrots, broccoli, cabbage, and peas.    Fruits, such as apples, bananas, raisins, peaches, and pears.    Nuts and legumes, especially peanuts, lentils, and kidney beans.  Easy ways to add fiber  The tips below offer some simple ways to add more high-fiber foods to your meals.    Start your day with a high-fiber breakfast. Eat a wheat bran cereal along with a sliced banana. Or, try peanut butter on whole-wheat toast.    Eat carrot sticks for snacks. They re easy to prepare, taste great, and are low in calories.    Use whole-grain breads instead of white bread for sandwiches.    Eat fruits for treats. Try an apple and some raisins instead of a candy bar.   Date Last Reviewed: 7/1/2016 2000-2019 [employer]. [address] [place] [address]. All rights reserved. This information is not intended as a substitute for professional medical care. Always follow your healthcare professional's instructions.           Patient Education     High-Fiber Diet  Fiber is in fruits, vegetables, cereals, and grains. Fiber passes through your body undigested. A high-fiber diet helps food move through your intestinal tract. The added bulk is helpful in preventing constipation. In people with diverticulosis, fiber helps clean out the pouches along the colon wall. It also prevents new pouches from forming. A high-fiber diet reduces the risk of colon cancer. It also lowers blood cholesterol and prevents high blood sugar in people with diabetes.    The fiber-rich foods listed below should be part of your diet. If you are not used to high-fiber foods, start with 1 or 2 foods from this list. Every 3 to 4 days add a new one to your diet. Do  this until you are eating 4 high-fiber foods per day. This should give you 20 to 35 grams of fiber a day. It is also important to drink a lot of water when you are on this diet. You should have 6 to 8 glasses of water a day. Water makes the fiber swell and increases the benefit.  Foods high in dietary fiber  The following foods are high in dietary fiber:    Breads. Breads made with 100% whole-wheat flour; parvin, wheat, or rye crackers; whole-grain tortillas, bran muffins.    Cereals. Whole-grain and bran cereals with bran (shredded wheat, wheat flakes, raisin bran, corn bran); oatmeal, rolled oats, granola, and brown rice.    Fruits. Fresh fruits and their edible skins (pears, prunes, raisins, berries, apples, and apricots); bananas, citrus fruit, mangoes, pineapple; and prune juice.    Nuts. Any nuts and seeds.    Vegetables. Best served raw or lightly cooked. All types, especially: green peas, celery, eggplant, potatoes, spinach, broccoli, [place] sprouts, winter squash, carrots, cauliflower, soybeans, lentils, and fresh and dried beans of all kinds.    Other. Popcorn, any spices.  Date Last Reviewed: 8/1/2016 2000-2019 [employer]. [address] [place] [address], [place] [address]. All rights reserved. This information is not intended as a substitute for professional medical care. Always follow your healthcare professional's instructions.               Return if symptoms worsen or fail to improve.    DEACON Jimenez [facility]

## 2020-08-26 NOTE — PATIENT INSTRUCTIONS
Hemorrhoids:  1. Increase fluid intake.   2. Increase dietary fiber.  3. Avoid straining or spending excessive time at stool.   4. Moist, gentle cleaning following bowel movement. Wipes can be helpful.   5. Use steroid cream as prescribed.       Patient Education     Treating Hemorrhoids: Self-Care    Follow your healthcare provider s advice about caring for your hemorrhoids at home. Some treatments help relieve symptoms right away. Others involve making changes in your diet and exercise habits. These can help ease constipation and prevent hemorrhoid symptoms from coming back.  Relieving symptoms  Your healthcare provider may prescribe anti-inflammatory medicine to help ease your symptoms. The following tips will also help relieve pain and swelling.    Take sitz baths. Taking a sitz bath means sitting in a few inches of warm bath water. Soaking for 10 minutes twice a day can provide welcome relief from painful hemorrhoids. It can also help the area stay clean.    Develop good bowel habits. Use the bathroom when you need to. Don t ignore the urge to move your bowels. This can lead to constipation, hard stools, and straining. Also, don t read while on the toilet. Sit only as long as needed. Wipe gently with soft, unscented toilet tissue or baby wipes.    Use ice packs. Placing an ice pack on a thrombosed external hemorrhoid can help relieve pain right away. It will also help reduce the blood clot. Use the ice for 15 to 20 minutes at a time. Keep a cloth between the ice and your skin to prevent skin damage.    Use other measures. Laxatives and enemas can help ease constipation. But use them only on your healthcare provider s advice. For symptom relief, try using cotton pads soaked in witch hazel. These are available at most drugstores. Over-the-counter hemorrhoid ointments and petroleum jelly can also provide relief.  Add fiber to your diet  Adding fiber to your diet can help relieve constipation by making stools  softer and easier to pass. To increase your fiber intake, your healthcare provider may recommend a bulking agent, such as psyllium. This is a high-fiber supplement available at most grocery stores and drugstores. Eating more fiber-rich foods will also help. There are two types of fiber:    Insoluble fiber is the main ingredient in bulking agents. It s also found in foods such as wheat bran, whole-grain breads, fresh fruits, and vegetables.    Soluble fiber is found in foods such as oat bran. Although soluble fiber is good for you, it may not ease constipation as much as foods high in insoluble fiber.  Drink more water  Along with a high-fiber diet, drinking more water can help ease constipation. This is because insoluble fiber absorbs water, making stools soft and bulky. Be sure to drink plenty of water throughout the day. Drinking fruit juices, such as prune juice or apple juice, can also help prevent constipation.  Get more exercise  Regular exercise aids digestion and helps prevent constipation. It s also great for your health. So talk with your healthcare provider about starting an exercise program. Low-impact activities, such as swimming or walking, are good places to start. Take it easy at first. And remember to drink plenty of water when you exercise.  High-fiber foods  High-fiber foods offer many benefits. By making your stools softer, they help heal and prevent swollen hemorrhoids. They may also help reduce the risk of colon and rectal cancer. Best of all, they re usually low in calories and taste great. Here are some examples of fiber-rich foods.    Whole grains, such as wheat bran, corn bran, and brown rice.    Vegetables, especially carrots, broccoli, cabbage, and peas.    Fruits, such as apples, bananas, raisins, peaches, and pears.    Nuts and legumes, especially peanuts, lentils, and kidney beans.  Easy ways to add fiber  The tips below offer some simple ways to add more high-fiber foods to your  meals.    Start your day with a high-fiber breakfast. Eat a wheat bran cereal along with a sliced banana. Or, try peanut butter on whole-wheat toast.    Eat carrot sticks for snacks. They re easy to prepare, taste great, and are low in calories.    Use whole-grain breads instead of white bread for sandwiches.    Eat fruits for treats. Try an apple and some raisins instead of a candy bar.   Date Last Reviewed: 7/1/2016 2000-2019 [employer]. [address] [place] [address]. All rights reserved. This information is not intended as a substitute for professional medical care. Always follow your healthcare professional's instructions.           Patient Education     High-Fiber Diet  Fiber is in fruits, vegetables, cereals, and grains. Fiber passes through your body undigested. A high-fiber diet helps food move through your intestinal tract. The added bulk is helpful in preventing constipation. In people with diverticulosis, fiber helps clean out the pouches along the colon wall. It also prevents new pouches from forming. A high-fiber diet reduces the risk of colon cancer. It also lowers blood cholesterol and prevents high blood sugar in people with diabetes.    The fiber-rich foods listed below should be part of your diet. If you are not used to high-fiber foods, start with 1 or 2 foods from this list. Every 3 to 4 days add a new one to your diet. Do this until you are eating 4 high-fiber foods per day. This should give you 20 to 35 grams of fiber a day. It is also important to drink a lot of water when you are on this diet. You should have 6 to 8 glasses of water a day. Water makes the fiber swell and increases the benefit.  Foods high in dietary fiber  The following foods are high in dietary fiber:    Breads. Breads made with 100% whole-wheat flour; parvin, wheat, or rye crackers; whole-grain tortillas, bran muffins.    Cereals. Whole-grain and bran cereals with bran (shredded wheat, wheat  flakes, raisin bran, corn bran); oatmeal, rolled oats, granola, and brown rice.    Fruits. Fresh fruits and their edible skins (pears, prunes, raisins, berries, apples, and apricots); bananas, citrus fruit, mangoes, pineapple; and prune juice.    Nuts. Any nuts and seeds.    Vegetables. Best served raw or lightly cooked. All types, especially: green peas, celery, eggplant, potatoes, spinach, broccoli, [place] sprouts, winter squash, carrots, cauliflower, soybeans, lentils, and fresh and dried beans of all kinds.    Other. Popcorn, any spices.  Date Last Reviewed: 8/1/2016 2000-2019 [employer]. [address] [place] [address]. All rights reserved. This information is not intended as a substitute for professional medical care. Always follow your healthcare professional's instructions.

## 2020-08-27 ASSESSMENT — ENCOUNTER SYMPTOMS
CONSTITUTIONAL NEGATIVE: 1
FEVER: 0
CONSTIPATION: 1

## 2020-09-05 DIAGNOSIS — F41.9 ANXIETY: ICD-10-CM

## 2020-09-08 ENCOUNTER — MYC REFILL (OUTPATIENT)
Dept: FAMILY MEDICINE | Facility: CLINIC | Age: 25
End: 2020-09-08

## 2020-09-08 DIAGNOSIS — F90.2 ATTENTION DEFICIT HYPERACTIVITY DISORDER (ADHD), COMBINED TYPE: ICD-10-CM

## 2020-09-08 DIAGNOSIS — F41.9 ANXIETY: ICD-10-CM

## 2020-09-08 RX ORDER — DULOXETIN HYDROCHLORIDE 30 MG/1
90 CAPSULE, DELAYED RELEASE ORAL DAILY
Qty: 90 CAPSULE | Refills: 1 | Status: CANCELLED | OUTPATIENT
Start: 2020-09-08

## 2020-09-08 RX ORDER — DULOXETIN HYDROCHLORIDE 30 MG/1
CAPSULE, DELAYED RELEASE ORAL
Qty: 90 CAPSULE | Refills: 1 | Status: SHIPPED | OUTPATIENT
Start: 2020-09-08 | End: 2020-10-14

## 2020-09-08 RX ORDER — DEXTROAMPHETAMINE SACCHARATE, AMPHETAMINE ASPARTATE, DEXTROAMPHETAMINE SULFATE AND AMPHETAMINE SULFATE 2.5; 2.5; 2.5; 2.5 MG/1; MG/1; MG/1; MG/1
10 TABLET ORAL 2 TIMES DAILY
Qty: 60 TABLET | Refills: 0 | Status: CANCELLED | OUTPATIENT
Start: 2020-09-08

## 2020-09-08 NOTE — TELEPHONE ENCOUNTER
"Requested Prescriptions   Pending Prescriptions Disp Refills     DULoxetine (CYMBALTA) 30 MG capsule [Pharmacy Med Name: DULOXETINE 30MG DR CAPSULE] 90 capsule 1     Sig: TAKE 3 CAPSULES BY MOUTH ONCE DAILY       Serotonin-Norepinephrine Reuptake Inhibitors  Passed - 9/5/2020  4:21 PM        Passed - Blood pressure under 140/90 in past 12 months     BP Readings from Last 3 Encounters:   08/26/20 124/62   02/25/20 108/62   01/28/20 118/64                 Passed - Recent (12 mo) or future (30 days) visit within the authorizing provider's specialty     Patient has had an office visit with the authorizing provider or a provider within the authorizing providers department within the previous 12 mos or has a future within next 30 days. See \"Patient Info\" tab in inbasket, or \"Choose Columns\" in Meds & Orders section of the refill encounter.              Passed - Medication is active on med list        Passed - Patient is age 18 or older             "

## 2020-09-09 ENCOUNTER — VIRTUAL VISIT (OUTPATIENT)
Dept: PSYCHOLOGY | Facility: CLINIC | Age: 25
End: 2020-09-09
Payer: COMMERCIAL

## 2020-09-09 DIAGNOSIS — F33.2 MAJOR DEPRESSIVE DISORDER, RECURRENT EPISODE, SEVERE WITH ANXIOUS DISTRESS (H): Primary | ICD-10-CM

## 2020-09-09 PROCEDURE — 90834 PSYTX W PT 45 MINUTES: CPT | Mod: GT | Performed by: PSYCHOLOGIST

## 2020-09-09 NOTE — PROGRESS NOTES
Progress Note  Disclaimer: Voice recognition software was used to generate this note. As a result, wrong word or 'sound-a-like' substitutions may have occurred due to the inherent limitations of voice recognition software. There may be errors in the script that have gone undetected. Please consider this when interpreting information found in this chart.     Patient Name: Daniel Thomas  Date: 9/9/2020       Service Type: Individual      Session Start Time: 4:00 PM  session End Time: 4:45 PM   Session Length: 45    Session #: 6  C day treatment intake dated 2/11/2020 by OLLIE Brown for additional information on this client.  Attendees: Client attended alone    Telemedicine Visit: The patient's condition can be safely assessed and treated via synchronous audio and visual telemedicine encounter.      Reason for Telemedicine Visit: Patient convenience (e.g. access to timely appointments / distance to available provider)    Originating Site (Patient Location): Patient's home    Distant Site (Provider Location): Provider Remote Setting    Consent:  The patient/guardian has verbally consented to: the potential risks and benefits of telemedicine (video visit) versus in person care; bill my insurance or make self-payment for services provided; and responsibility for payment of non-covered services.     Mode of Communication:  Video Conference via [employer]    As the provider I attest to compliance with applicable laws and regulations related to telemedicine.         Treatment Plan Last Reviewed: 5/18/2020  PHQ-9 / BILLY-7 : See flow sheets    DATA  Interactive Complexity: No  Crisis: No       Progress Since Last Session (Related to Symptoms / Goals / Homework):   Symptoms: Improving Things are largely back to normal at work.  He has been a little more assertive with people that are disrespecting    Homework: Achieved / completed to satisfaction      Episode of Care Goals:  Satisfactory progress - ACTION (Actively working towards change); Intervened by reinforcing change plan / affirming steps taken     Current / Ongoing Stressors and Concerns:   Client has a bit of a cold currently spoiled holiday weekend.   Client reports he recently got a small fishing boat.  Duloxetine has been increased to 90 which has improved his anxiety.  He is getting a little more sleep.  Also drinking less.  Like to talk about his compulsive handwashing next time.  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 Has a date with a friend of a friend.  Handwashing 4-5x/day.Exclusive of restrooms.    Date went OK, drank a bit too much. Not really planning anything more.     Treatment Objective(s) Addressed in This Session:   Establish rapport with client, assess for behavioral activation  Reviewed Diaphragmatic breathing     Intervention:   Interpersonal Therapy: Establish rapport collaborate on treatment planning        ASSESSMENT: Current Emotional / Mental Status (status of significant symptoms):   Risk status (Self / Other harm or suicidal ideation)   Patient denies current fears or concerns for personal safety.   Patient denies current or recent suicidal ideation or behaviors.   Patient denies current or recent homicidal ideation or behaviors.   Patient denies current or recent self injurious behavior or ideation.   Patient denies other safety concerns.   Patient reports there has been no change in risk factors since their last session.     Patient reports there has been no change in protective factors since their last session.     Recommended that patient call 911 or go to the local ED should there be a change in any of these risk factors.     Appearance:   Appropriate    Eye Contact:   Fair    Psychomotor Behavior: Restless    Attitude:   Cooperative    Orientation:   All   Speech    Rate / Production: Normal/ Responsive Normal     Volume:  Soft    Mood:    Normal   Affect:    Appropriate    Thought Content:  Clear     Thought Form:  Coherent  Logical    Insight:    Good      Medication Review:   No changes to current psychiatric medication(s)     Medication Compliance:   Yes     Changes in Health Issues:   None reported     Chemical Use Review:   Substance Use: Chemical use reviewed, no active concerns identified      Tobacco Use: No change in amount of tobacco use since last session.  Patient declined discussion at this time     Using CBD for sleep.    Diagnosis:  1. Major depressive disorder, recurrent episode, severe with anxious distress (H)        Collateral Reports Completed:   Not Applicable    PLAN: (Patient Tasks / Therapist Tasks / Other)  Patient to review his safety plan, consult with prescribing physician if there are any side effects from his medications.        Wallace Obregon                                                         ______________________________________________________________________    Treatment Plan    Patient's Name: Daniel Thomas  YOB: 1995    Date: 5/18/2020    DSM5 Diagnoses: (Sustained by DSM5 Criteria Listed Above)  Diagnoses:      Autism Spectrum Disorder 299.00(F84.0)  Associated with a known medical or genetic condition or environmental factor- per patient  296.33 (F33.2) Major Depressive Disorder, Recurrent Episode, Severe _ and With anxious distress  300.3 (F42) Obsessive Compulsive Disorder- per patient          Psychosocial & Contextual Factors:   Housing- He lives in an apartment and it is very noisy and there is some ticking in the walls and it is irritating. Also the landlord comes in a random times. Lives alone in [place]. He fears that someone can get  Into his apartment and has to check and recheck before he leaves - he feels this is part of OCD.     Work- Boss can be inconsistent. 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 His boss has told him that he has bipolar and has mood swings. Has worked  there for a 1 1/2 years. A co-worked that he has known for a 1/2 year just committed suicide. He missed the  as he did not hear about it until later.      Relationships- As a child he saw someone who did not watsh their hands and this was a triggering event. And since then he has really struggled with flashbacks with that specifcic person and the flashbacks cause severe anxiety. It has been happening from 1x a week to daily for the past 12 or 13 years. He cannot get rid of the flashbacks.  Frequently has to wash his own hands up to 20+ times a day. And takes a shower 2 to 3x a day. This helps temporarily.  Nightmares also about someone not washing hands especially when he sleeps in a bed so the last couple weeks he has been sleeping in a chair.        Referral / Collaboration:  Referral to another professional/service is not indicated at this time..  25  Anticipated number of session or this episode of care: 25    Goals  1. Education- the Biopsychosocial model of depression  a. Client will be able to describe how depression is effecting them physically, emotionally and socially  2. Behavioral Activation  a. Client will learn to assess their depression on a day to day basis  b. Client will Identify two forms of exercise/activity and engage in them 3 times per week  c. Client will Identify 3 things that make them laugh, and engage in these 5 times per week.  d. Client will Identify 1-2Creative activities or hobbies  and engage in them 2 times per week  e. Client will identify music, movies, books that make them feel good and use them 3-4 times per week  3. Self-care  a. Client will identify 5 things they can do just for themselves  b. Client will take time for quiet, reflection, meditation 5 times per week  c. Client will Learn to set boundaries when appropriate  d. Client will Identify 2 individuals they can call on for support, distraction  4. Assessment of progress  a. Client will engage in assessment of  their progress on a regular basis    Patient has reviewed and agreed to the above plan.      Wallace Obregon  May 28, 2020

## 2020-09-23 ENCOUNTER — VIRTUAL VISIT (OUTPATIENT)
Dept: FAMILY MEDICINE | Facility: OTHER | Age: 25
End: 2020-09-23
Payer: COMMERCIAL

## 2020-09-23 ENCOUNTER — VIRTUAL VISIT (OUTPATIENT)
Dept: PSYCHOLOGY | Facility: CLINIC | Age: 25
End: 2020-09-23
Payer: COMMERCIAL

## 2020-09-23 DIAGNOSIS — F33.2 MAJOR DEPRESSIVE DISORDER, RECURRENT EPISODE, SEVERE WITH ANXIOUS DISTRESS (H): Primary | ICD-10-CM

## 2020-09-23 PROCEDURE — 90832 PSYTX W PT 30 MINUTES: CPT | Mod: GT | Performed by: PSYCHOLOGIST

## 2020-09-23 PROCEDURE — 99421 OL DIG E/M SVC 5-10 MIN: CPT | Performed by: PHYSICIAN ASSISTANT

## 2020-09-23 NOTE — PROGRESS NOTES
"Date: 2020 07:55:03  Clinician: Neisha Dinero  Clinician NPI: [id number]  Patient: Daniel Thomas  Patient : 1995  Patient Address: [address] [place] [address]. [address], [place] [address]  Patient Phone: [phone]  Visit Protocol: URI  Patient Summary:  Daniel is a 24 year old ( : 1995 ) male who initiated a [facility] Visit for COVID-19 (Coronavirus) evaluation and screening. When asked the question \"Please sign me up to receive news, health information and promotions from [facility].\", Daniel responded \"No\".    When asked when his symptoms started, Daniel reported that he does not have any symptoms.   He denies taking antibiotic medication in the past month and having recent facial or sinus surgery in the past 60 days.    Pertinent COVID-19 (Coronavirus) information  In the past 14 days, Daniel has not worked in a congregate living setting.   He does not work or volunteer as healthcare worker or a  and does not work or volunteer in a healthcare facility.   Daniel also has not lived in a congregate living setting in the past 14 days. He does not live with a healthcare worker.   Daniel has not had a close contact with a laboratory-confirmed COVID-19 patient within the last 14 days.   Since 2019, Daniel and has had upper respiratory infection (URI) or influenza-like illness. Has not been diagnosed with lab-confirmed COVID-19 test      Date(s) of previous URI or influenza-like illness (free-text): 4 weeks ago from today and still going on     Symptoms Daniel experienced during previous URI or influenza-like illness as reported by the patient (free-text): Stuffy nose, stuffy throat, sometimes hard to breathe.        Pertinent medical history  Daniel needs a return to work/school note.   Weight: 250 lbs   Daniel smokes or uses smokeless tobacco.   Weight: 250 lbs    MEDICATIONS: duloxetine oral, amphetamine oral, ALLERGIES: NKDA  Clinician Response:  Dear Daniel,   Based on the details " you've shared, you are concerned about contact with someone who may have been exposed to coronavirus (COVID-19). Based on [facility] guidelines you do not need to be tested at this time.  Testing for people who do not have symptoms is only required in certain instances, including direct contact with a person who has tested positive for COVID-19, or for those who are traveling to locations where testing is required beforehand.  Please redo an [facility] visit or call your clinic if you think we missed needed information, your exposure information has changed, or you develop symptoms.  What are the symptoms of COVID-19?  The most common symptoms are cough, fever and trouble breathing. Less common symptoms include headache, body aches, fatigue (feeling very tired), chills, sore throat, stuffy or runny nose, diarrhea (loose poop), loss of taste or smell, belly pain, and nausea or vomiting (feeling sick to your stomach or throwing up).  Where can I get more information?  [facility] -- About COVID-19: [email or web address]  CDC -- What to Do If You're Sick: www.cdc.gov/coronavirus/2019-ncov/about/steps-when-sick.html  CDC -- Ending Home Isolation: www.cdc.gov/coronavirus/2019-ncov/hcp/disposition-in-home-patients.html  CDC -- Caring for Someone: www.cdc.gov/coronavirus/2019-ncov/if-you-are-sick/care-for-someone.html  [facility] -- Interim Guidance for Hospital Discharge to Home: www.health.[facility].[place]/diseases/coronavirus/hcp/hospdischarge.pdf  [facility] clinical trials (COVID-19 research studies): clinicalaffairs.[facility]/umn-clinical-trials  Below are the COVID-19 hotlines at the [place] Department of [facility] ([facility]). Interpreters are available.  For health questions: Call 678-737-1065 or 1-657.268.4700 (7 a.m. to 7 p.m.)  For questions about schools and childcare: Call 681-092-3740 or 4-412-553-2491 (7 a.m. to 7 p.m.)    Diagnosis: Other specified phobia  Diagnosis ICD: F40.298
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
## 2020-10-14 ENCOUNTER — MYC REFILL (OUTPATIENT)
Dept: FAMILY MEDICINE | Facility: CLINIC | Age: 25
End: 2020-10-14

## 2020-10-14 DIAGNOSIS — F41.9 ANXIETY: ICD-10-CM

## 2020-10-15 RX ORDER — DULOXETIN HYDROCHLORIDE 30 MG/1
CAPSULE, DELAYED RELEASE ORAL
Qty: 90 CAPSULE | Refills: 1 | Status: SHIPPED | OUTPATIENT
Start: 2020-10-15 | End: 2020-12-22

## 2020-10-16 ENCOUNTER — MYC REFILL (OUTPATIENT)
Dept: FAMILY MEDICINE | Facility: CLINIC | Age: 25
End: 2020-10-16

## 2020-10-16 DIAGNOSIS — F41.9 ANXIETY: ICD-10-CM

## 2020-10-19 RX ORDER — DULOXETIN HYDROCHLORIDE 30 MG/1
CAPSULE, DELAYED RELEASE ORAL
Qty: 90 CAPSULE | Refills: 1 | OUTPATIENT
Start: 2020-10-19

## 2020-11-16 ENCOUNTER — HEALTH MAINTENANCE LETTER (OUTPATIENT)
Age: 25
End: 2020-11-16

## 2020-11-24 ENCOUNTER — VIRTUAL VISIT (OUTPATIENT)
Dept: FAMILY MEDICINE | Facility: CLINIC | Age: 25
End: 2020-11-24
Payer: COMMERCIAL

## 2020-11-24 DIAGNOSIS — F41.9 ANXIETY: ICD-10-CM

## 2020-11-24 DIAGNOSIS — F90.2 ATTENTION DEFICIT HYPERACTIVITY DISORDER (ADHD), COMBINED TYPE: Primary | ICD-10-CM

## 2020-11-24 PROCEDURE — 99213 OFFICE O/P EST LOW 20 MIN: CPT | Mod: TEL | Performed by: FAMILY MEDICINE

## 2020-11-24 PROCEDURE — 96127 BRIEF EMOTIONAL/BEHAV ASSMT: CPT | Performed by: FAMILY MEDICINE

## 2020-11-24 RX ORDER — DEXTROAMPHETAMINE SACCHARATE, AMPHETAMINE ASPARTATE, DEXTROAMPHETAMINE SULFATE AND AMPHETAMINE SULFATE 5; 5; 5; 5 MG/1; MG/1; MG/1; MG/1
20 TABLET ORAL DAILY
Qty: 30 TABLET | Refills: 0 | Status: SHIPPED | OUTPATIENT
Start: 2021-01-25 | End: 2021-02-24

## 2020-11-24 RX ORDER — DEXTROAMPHETAMINE SACCHARATE, AMPHETAMINE ASPARTATE, DEXTROAMPHETAMINE SULFATE AND AMPHETAMINE SULFATE 5; 5; 5; 5 MG/1; MG/1; MG/1; MG/1
20 TABLET ORAL DAILY
Qty: 30 TABLET | Refills: 0 | Status: SHIPPED | OUTPATIENT
Start: 2020-11-24 | End: 2020-12-24

## 2020-11-24 RX ORDER — DEXTROAMPHETAMINE SACCHARATE, AMPHETAMINE ASPARTATE, DEXTROAMPHETAMINE SULFATE AND AMPHETAMINE SULFATE 5; 5; 5; 5 MG/1; MG/1; MG/1; MG/1
20 TABLET ORAL DAILY
Qty: 30 TABLET | Refills: 0 | Status: SHIPPED | OUTPATIENT
Start: 2020-12-25 | End: 2021-01-24

## 2020-11-24 RX ORDER — DULOXETIN HYDROCHLORIDE 30 MG/1
CAPSULE, DELAYED RELEASE ORAL
Qty: 90 CAPSULE | Refills: 1 | Status: CANCELLED | OUTPATIENT
Start: 2020-11-24

## 2020-11-24 RX ORDER — DEXTROAMPHETAMINE SACCHARATE, AMPHETAMINE ASPARTATE, DEXTROAMPHETAMINE SULFATE AND AMPHETAMINE SULFATE 2.5; 2.5; 2.5; 2.5 MG/1; MG/1; MG/1; MG/1
10 TABLET ORAL 2 TIMES DAILY
Qty: 60 TABLET | Refills: 0 | Status: CANCELLED | OUTPATIENT
Start: 2020-11-24

## 2020-11-24 ASSESSMENT — ANXIETY QUESTIONNAIRES
3. WORRYING TOO MUCH ABOUT DIFFERENT THINGS: NEARLY EVERY DAY
6. BECOMING EASILY ANNOYED OR IRRITABLE: SEVERAL DAYS
5. BEING SO RESTLESS THAT IT IS HARD TO SIT STILL: NEARLY EVERY DAY
1. FEELING NERVOUS, ANXIOUS, OR ON EDGE: SEVERAL DAYS
IF YOU CHECKED OFF ANY PROBLEMS ON THIS QUESTIONNAIRE, HOW DIFFICULT HAVE THESE PROBLEMS MADE IT FOR YOU TO DO YOUR WORK, TAKE CARE OF THINGS AT HOME, OR GET ALONG WITH OTHER PEOPLE: SOMEWHAT DIFFICULT
7. FEELING AFRAID AS IF SOMETHING AWFUL MIGHT HAPPEN: NOT AT ALL
GAD7 TOTAL SCORE: 12
2. NOT BEING ABLE TO STOP OR CONTROL WORRYING: SEVERAL DAYS

## 2020-11-24 ASSESSMENT — PATIENT HEALTH QUESTIONNAIRE - PHQ9
SUM OF ALL RESPONSES TO PHQ QUESTIONS 1-9: 7
5. POOR APPETITE OR OVEREATING: NEARLY EVERY DAY

## 2020-11-24 NOTE — PROGRESS NOTES
"Daniel Thomas is a 25 year old male who is being evaluated via a billable video visit.   Switched to phone visit unable to connect via video.     The patient has been notified of following:     \"This video visit will be conducted via a call between you and your physician/provider. We have found that certain health care needs can be provided without the need for an in-person physical exam.  This service lets us provide the care you need with a video conversation.  If a prescription is necessary we can send it directly to your pharmacy.  If lab work is needed we can place an order for that and you can then stop by our lab to have the test done at a later time.    Video visits are billed at different rates depending on your insurance coverage.  Please reach out to your insurance provider with any questions.    If during the course of the call the physician/provider feels a video visit is not appropriate, you will not be charged for this service.\"    Patient has given verbal consent for Video visit? Yes  How would you like to obtain your AVS? MyChart  If you are dropped from the video visit, the video invite should be resent to: Text to cell phone: [phone]  Will anyone else be joining your video visit? No      Subjective     Daniel Thomas is a 25 year old male who presents today via video visit for the following health issues:    HPI     Depression and Anxiety Follow-Up    How are you doing with your depression since your last visit? Improved a little bit    How are you doing with your anxiety since your last visit?  Improved a little bit    Are you having other symptoms that might be associated with depression or anxiety? No    Have you had a significant life event? No     Do you have any concerns with your use of alcohol or other drugs? No     Therapy with Wallace Obregon going well.     ADHD diagnosed in 3rd grade and rediagnosed after high school when also did autism testing. Better motivation and focus " with Adderall.     Started Cymbalta 2-3 years ago with Dr. Greenwood. Zoloft, citalopram in past when younger but often forgot medication so didn't seem to help.        No history of Lisa, no visual or auditory hallucinations.  OCD symptoms and thinking improving with hand washing worries.   No thoughts of paranoia.     Caffeine: 1 cans of pop every 1-2 days and energy drink once a week.  Alcohol: 5 drinks a week  Vapin nicotine   Drug/THC: None     Working for [place] [employer]    Social History     Tobacco Use     Smoking status: Former Smoker     Packs/day: 0.00     Types: Cigarettes     Quit date: 12/10/2017     Years since quittin.9     Smokeless tobacco: Never Used     Tobacco comment: patient vapes    Substance Use Topics     Alcohol use: Yes     Comment: socially      Drug use: No     PHQ 2020 2020 7/3/2020   PHQ-9 Total Score 21 17 13   Q9: Thoughts of better off dead/self-harm past 2 weeks More than half the days Several days Not at all     BILLY-7 SCORE 2020 2020 7/3/2020   Total Score 18 15 20     Last PHQ-9 2020   1.  Little interest or pleasure in doing things 0   2.  Feeling down, depressed, or hopeless 0   3.  Trouble falling or staying asleep, or sleeping too much 1   4.  Feeling tired or having little energy 1   5.  Poor appetite or overeating 1   6.  Feeling bad about yourself 0   7.  Trouble concentrating 3   8.  Moving slowly or restless 1   Q9: Thoughts of better off dead/self-harm past 2 weeks 0   PHQ-9 Total Score 7   Difficulty at work, home, or with people Very difficult     BILLY-7  2020   1. Feeling nervous, anxious, or on edge 1   2. Not being able to stop or control worrying 1   3. Worrying too much about different things 3   4. Trouble relaxing 3   5. Being so restless that it is hard to sit still 3   6. Becoming easily annoyed or irritable 1   7. Feeling afraid, as if something awful might happen 0   BILLY-7 Total Score 12   If you checked any problems, how  difficult have they made it for you to do your work, take care of things at home, or get along with other people? Somewhat difficult       Suicide Assessment Five-step Evaluation and Treatment (SAFE-T)    SUBJECTIVE:  Patient is here today to recheck ADHD/ADD. Adderall 10 MG  Updates since last visit: 7/3/2020  Routine for taking medicine, including time: in the morning before 8 O'clock.  Time medicine wears off: 9 PM  Issues at school/Work: none  Issues at home: none  Control of symptoms: focus is not as well controlled, feels slower with things.    Side effects:  Headaches: No  Stomach aches: No  Irritability/mood swings: Yes - very mild.  Difficulties with sleep: No. If he takes it after 8 AM he will have troubles sleeping.  Social withdrawal: No  Unusual movements/tics: Yes- patient states he has tics with or without it.  Decreased appetite: No    Other concerns: Dry eyes and dry mouth.          How many servings of fruits and vegetables do you eat daily?  0-1    On average, how many sweetened beverages do you drink each day (Examples: soda, juice, sweet tea, etc.  Do NOT count diet or artificially sweetened beverages)?   2    How many days per week do you exercise enough to make your heart beat faster? 4    How many minutes a day do you exercise enough to make your heart beat faster? 60 or more    How many days per week do you miss taking your medication? 0       Video Start Time: 11:38 AM      Review of Systems   Constitutional, HEENT, cardiovascular, pulmonary, gi and gu systems are negative, except as otherwise noted.      Objective           Vitals:  No vitals were obtained today due to virtual visit.    Physical Exam     GENERAL: Healthy, alert and no distress  RESP: No audible wheeze, cough, or visible cyanosis.  No visible retractions or increased work of breathing.    SKIN: Visible skin clear. No significant rash, abnormal pigmentation or lesions.  NEURO: Cranial nerves grossly intact.  Mentation and  speech appropriate for age.  PSYCH: Mentation is normal, affect normal/bright, judgement and insight intact, normal speech       No results found for this or any previous visit (from the past 24 hour(s)).        Assessment & Plan     Daniel was seen today for recheck medication.    Diagnoses and all orders for this visit:    Attention deficit hyperactivity disorder (ADHD), combined type: stable, refill, switch to the 20mg once a day instead of 10mg which he was taking 2 at once anyway.  -     amphetamine-dextroamphetamine (ADDERALL) 20 MG tablet; Take 1 tablet (20 mg) by mouth daily  -     amphetamine-dextroamphetamine (ADDERALL) 20 MG tablet; Take 1 tablet (20 mg) by mouth daily  -     amphetamine-dextroamphetamine (ADDERALL) 20 MG tablet; Take 1 tablet (20 mg) by mouth daily           BMI:   Estimated body mass index is 31.87 kg/m  as calculated from the following:    Height as of 2/25/20: 1.829 m (6').    Weight as of 8/26/20: 106.6 kg (235 lb).   Weight management plan: Discussed healthy diet and exercise guidelines         Patient Instructions   I sent refills for medications for adderal 20mg immediate release once a day in the morning.      Return in about 3 months (around 2/24/2021).    Nadeem Hernández MD  [facility]      Total of 12 minutes was spent on phone visit with patient. Options for treatment and/or follow-up care were reviewed with the patient. Patient was engaged and actively involved in the decision making process. She verbalized understanding of the options discussed and was satisfied with the final plan.

## 2020-11-24 NOTE — PATIENT INSTRUCTIONS
I sent refills for medications for adderal 20mg immediate release once a day in the morning.    Get the [facility] home saliva COVID test and get the kit ordered to have at home for if you get symptoms.    With a close exposure do wait 14 days after your last exposure to the person to go back to work, unless you can work totally away from any other person.

## 2020-11-25 ASSESSMENT — ANXIETY QUESTIONNAIRES: GAD7 TOTAL SCORE: 12

## 2020-12-18 DIAGNOSIS — F41.9 ANXIETY: ICD-10-CM

## 2020-12-18 NOTE — TELEPHONE ENCOUNTER
"Requested Prescriptions   Pending Prescriptions Disp Refills     DULoxetine (CYMBALTA) 30 MG capsule [Pharmacy Med Name: DULOXETINE 30MG DR CAPSULE] 90 capsule 1     Sig: TAKE 3 CAPSULES BY MOUTH ONCE DAILY       Serotonin-Norepinephrine Reuptake Inhibitors  Passed - 12/18/2020  1:00 AM        Passed - Blood pressure under 140/90 in past 12 months     BP Readings from Last 3 Encounters:   08/26/20 124/62   02/25/20 108/62   01/28/20 118/64                 Passed - Recent (12 mo) or future (30 days) visit within the authorizing provider's specialty     Patient has had an office visit with the authorizing provider or a provider within the authorizing providers department within the previous 12 mos or has a future within next 30 days. See \"Patient Info\" tab in inbasket, or \"Choose Columns\" in Meds & Orders section of the refill encounter.              Passed - Medication is active on med list        Passed - Patient is age 18 or older             "

## 2020-12-22 RX ORDER — DULOXETIN HYDROCHLORIDE 30 MG/1
CAPSULE, DELAYED RELEASE ORAL
Qty: 90 CAPSULE | Refills: 0 | Status: SHIPPED | OUTPATIENT
Start: 2020-12-22 | End: 2021-01-25

## 2020-12-22 NOTE — TELEPHONE ENCOUNTER
Prescription approved per [facility] Refill Protocol.  Joaquina Street RN

## 2021-01-21 DIAGNOSIS — F41.9 ANXIETY: ICD-10-CM

## 2021-01-21 NOTE — TELEPHONE ENCOUNTER
"Requested Prescriptions   Pending Prescriptions Disp Refills     DULoxetine (CYMBALTA) 30 MG capsule [Pharmacy Med Name: DULOXETINE 30MG DR CAPSULE] 90 capsule 0     Sig: TAKE 3 CAPSULES BY MOUTH ONCE DAILY       Serotonin-Norepinephrine Reuptake Inhibitors  Passed - 1/21/2021  1:21 AM        Passed - Blood pressure under 140/90 in past 12 months     BP Readings from Last 3 Encounters:   08/26/20 124/62   02/25/20 108/62   01/28/20 118/64                 Passed - Recent (12 mo) or future (30 days) visit within the authorizing provider's specialty     Patient has had an office visit with the authorizing provider or a provider within the authorizing providers department within the previous 12 mos or has a future within next 30 days. See \"Patient Info\" tab in inbasket, or \"Choose Columns\" in Meds & Orders section of the refill encounter.              Passed - Medication is active on med list        Passed - Patient is age 18 or older             "

## 2021-01-25 RX ORDER — DULOXETIN HYDROCHLORIDE 30 MG/1
CAPSULE, DELAYED RELEASE ORAL
Qty: 90 CAPSULE | Refills: 0 | Status: SHIPPED | OUTPATIENT
Start: 2021-01-25 | End: 2021-02-24

## 2021-02-20 DIAGNOSIS — F41.9 ANXIETY: ICD-10-CM

## 2021-02-22 NOTE — TELEPHONE ENCOUNTER
"Requested Prescriptions   Pending Prescriptions Disp Refills     DULoxetine (CYMBALTA) 30 MG capsule [Pharmacy Med Name: DULOXETINE 30MG DR CAPSULE] 90 capsule 0     Sig: TAKE 3 CAPSULES BY MOUTH ONCE DAILY       Serotonin-Norepinephrine Reuptake Inhibitors  Passed - 2/20/2021  1:12 AM        Passed - Blood pressure under 140/90 in past 12 months     BP Readings from Last 3 Encounters:   08/26/20 124/62   02/25/20 108/62   01/28/20 118/64                 Passed - Recent (12 mo) or future (30 days) visit within the authorizing provider's specialty     Patient has had an office visit with the authorizing provider or a provider within the authorizing providers department within the previous 12 mos or has a future within next 30 days. See \"Patient Info\" tab in inbasket, or \"Choose Columns\" in Meds & Orders section of the refill encounter.              Passed - Medication is active on med list        Passed - Patient is age 18 or older             "

## 2021-02-24 RX ORDER — DULOXETIN HYDROCHLORIDE 30 MG/1
CAPSULE, DELAYED RELEASE ORAL
Qty: 90 CAPSULE | Refills: 0 | Status: SHIPPED | OUTPATIENT
Start: 2021-02-24 | End: 2021-03-24

## 2021-03-22 DIAGNOSIS — F41.9 ANXIETY: ICD-10-CM

## 2021-03-22 NOTE — LETTER
March 25, 2021      Daniel Thomas  [address]   [place] [address]        Dear Daniel,   We received a refill request for your Cymbalta medication.  This medication has been refilled for 30 days as you are due for an office visit for further refills.  Please call [phone] to schedule an appointment.          Sincerely,        Nadeem Hernández MD

## 2021-03-22 NOTE — TELEPHONE ENCOUNTER
"Requested Prescriptions   Pending Prescriptions Disp Refills     DULoxetine (CYMBALTA) 30 MG capsule [Pharmacy Med Name: DULOXETINE 30MG DR CAPSULE] 90 capsule 0     Sig: TAKE 3 CAPSULES BY MOUTH ONCE DAILY       Serotonin-Norepinephrine Reuptake Inhibitors  Passed - 3/22/2021  1:15 AM        Passed - Blood pressure under 140/90 in past 12 months     BP Readings from Last 3 Encounters:   08/26/20 124/62   02/25/20 108/62   01/28/20 118/64                 Passed - Recent (12 mo) or future (30 days) visit within the authorizing provider's specialty     Patient has had an office visit with the authorizing provider or a provider within the authorizing providers department within the previous 12 mos or has a future within next 30 days. See \"Patient Info\" tab in inbasket, or \"Choose Columns\" in Meds & Orders section of the refill encounter.              Passed - Medication is active on med list        Passed - Patient is age 18 or older             "

## 2021-03-23 NOTE — TELEPHONE ENCOUNTER
Routing refill request to provider for review/approval because:  Patient needs to be seen because:  Due for appointment around 2/24/21    Joaquina Street RN

## 2021-03-24 RX ORDER — DULOXETIN HYDROCHLORIDE 30 MG/1
CAPSULE, DELAYED RELEASE ORAL
Qty: 30 CAPSULE | Refills: 0 | Status: SHIPPED | OUTPATIENT
Start: 2021-03-24 | End: 2021-04-20

## 2021-04-20 ENCOUNTER — VIRTUAL VISIT (OUTPATIENT)
Dept: FAMILY MEDICINE | Facility: CLINIC | Age: 26
End: 2021-04-20
Payer: COMMERCIAL

## 2021-04-20 DIAGNOSIS — F41.9 ANXIETY: ICD-10-CM

## 2021-04-20 DIAGNOSIS — F90.2 ATTENTION DEFICIT HYPERACTIVITY DISORDER (ADHD), COMBINED TYPE: Primary | ICD-10-CM

## 2021-04-20 PROCEDURE — 99214 OFFICE O/P EST MOD 30 MIN: CPT | Mod: 95 | Performed by: FAMILY MEDICINE

## 2021-04-20 RX ORDER — DEXTROAMPHETAMINE SACCHARATE, AMPHETAMINE ASPARTATE, DEXTROAMPHETAMINE SULFATE AND AMPHETAMINE SULFATE 5; 5; 5; 5 MG/1; MG/1; MG/1; MG/1
20 TABLET ORAL DAILY
Qty: 30 TABLET | Refills: 0 | Status: SHIPPED | OUTPATIENT
Start: 2021-05-20 | End: 2021-06-19

## 2021-04-20 RX ORDER — DULOXETIN HYDROCHLORIDE 30 MG/1
90 CAPSULE, DELAYED RELEASE ORAL DAILY
Qty: 90 CAPSULE | Refills: 5 | Status: SHIPPED | OUTPATIENT
Start: 2021-04-20 | End: 2021-08-27

## 2021-04-20 RX ORDER — DEXTROAMPHETAMINE SACCHARATE, AMPHETAMINE ASPARTATE, DEXTROAMPHETAMINE SULFATE AND AMPHETAMINE SULFATE 5; 5; 5; 5 MG/1; MG/1; MG/1; MG/1
20 TABLET ORAL DAILY
Qty: 30 TABLET | Refills: 0 | Status: SHIPPED | OUTPATIENT
Start: 2021-06-19 | End: 2021-07-19

## 2021-04-20 RX ORDER — DEXTROAMPHETAMINE SACCHARATE, AMPHETAMINE ASPARTATE, DEXTROAMPHETAMINE SULFATE AND AMPHETAMINE SULFATE 5; 5; 5; 5 MG/1; MG/1; MG/1; MG/1
20 TABLET ORAL DAILY
Qty: 30 TABLET | Refills: 0 | Status: SHIPPED | OUTPATIENT
Start: 2021-04-20 | End: 2021-05-20

## 2021-04-20 RX ORDER — DEXTROAMPHETAMINE SACCHARATE, AMPHETAMINE ASPARTATE, DEXTROAMPHETAMINE SULFATE AND AMPHETAMINE SULFATE 5; 5; 5; 5 MG/1; MG/1; MG/1; MG/1
20 TABLET ORAL DAILY
Qty: 30 TABLET | Refills: 0 | Status: CANCELLED | OUTPATIENT
Start: 2021-04-20

## 2021-04-20 ASSESSMENT — ANXIETY QUESTIONNAIRES
2. NOT BEING ABLE TO STOP OR CONTROL WORRYING: NOT AT ALL
IF YOU CHECKED OFF ANY PROBLEMS ON THIS QUESTIONNAIRE, HOW DIFFICULT HAVE THESE PROBLEMS MADE IT FOR YOU TO DO YOUR WORK, TAKE CARE OF THINGS AT HOME, OR GET ALONG WITH OTHER PEOPLE: SOMEWHAT DIFFICULT
5. BEING SO RESTLESS THAT IT IS HARD TO SIT STILL: NEARLY EVERY DAY
6. BECOMING EASILY ANNOYED OR IRRITABLE: SEVERAL DAYS
7. FEELING AFRAID AS IF SOMETHING AWFUL MIGHT HAPPEN: NOT AT ALL
3. WORRYING TOO MUCH ABOUT DIFFERENT THINGS: SEVERAL DAYS
GAD7 TOTAL SCORE: 7
1. FEELING NERVOUS, ANXIOUS, OR ON EDGE: SEVERAL DAYS

## 2021-04-20 ASSESSMENT — PATIENT HEALTH QUESTIONNAIRE - PHQ9
SUM OF ALL RESPONSES TO PHQ QUESTIONS 1-9: 5
5. POOR APPETITE OR OVEREATING: SEVERAL DAYS

## 2021-04-20 NOTE — PROGRESS NOTES
Daniel is a 25 year old who is being evaluated via a billable video visit.    - Text link to patient's cell.  How would you like to obtain your AVS? MyChart  If the video visit is dropped, the invitation should be resent by: Text to cell phone: [phone]  Will anyone else be joining your video visit? No    Video Start Time: 10:11 AM    Assessment & Plan     Anxiety  Stable, refill  - DULoxetine (CYMBALTA) 30 MG capsule; Take 3 capsules (90 mg) by mouth daily    Attention deficit hyperactivity disorder (ADHD), combined type  Stable, refill  - amphetamine-dextroamphetamine (ADDERALL) 20 MG tablet; Take 1 tablet (20 mg) by mouth daily  - amphetamine-dextroamphetamine (ADDERALL) 20 MG tablet; Take 1 tablet (20 mg) by mouth daily  - amphetamine-dextroamphetamine (ADDERALL) 20 MG tablet; Take 1 tablet (20 mg) by mouth daily          Return in about 3 months (around 7/20/2021) for Follow up, with me, in person.    Nadeem Hernández MD  [facility]   Daniel is a 25 year old who presents for the following health issues     HPI     Anxiety Follow-Up- Cymbalta 30 MG    How are you doing with your anxiety since your last visit? Stable    Are you having other symptoms that might be associated with anxiety? No    Have you had a significant life event? No     Are you feeling depressed? No    Do you have any concerns with your use of alcohol or other drugs? No    Social History     Tobacco Use     Smoking status: Former Smoker     Packs/day: 0.00     Types: Cigarettes     Quit date: 12/10/2017     Years since quitting: 3.3     Smokeless tobacco: Never Used     Tobacco comment: patient vapes    Substance Use Topics     Alcohol use: Yes     Comment: socially      Drug use: No     BILLY-7 SCORE 2/17/2020 7/3/2020 11/24/2020   Total Score 15 20 12     PHQ 4/7/2020 7/3/2020 11/24/2020   PHQ-9 Total Score 17 13 7   Q9: Thoughts of better off dead/self-harm past 2 weeks Several days Not at all Not at  all     Last PHQ-9 4/20/2021   1.  Little interest or pleasure in doing things 0   2.  Feeling down, depressed, or hopeless 1   3.  Trouble falling or staying asleep, or sleeping too much 1   4.  Feeling tired or having little energy 1   5.  Poor appetite or overeating 1   6.  Feeling bad about yourself 0   7.  Trouble concentrating 0   8.  Moving slowly or restless 1   Q9: Thoughts of better off dead/self-harm past 2 weeks 0   PHQ-9 Total Score 5   Difficulty at work, home, or with people Not difficult at all     BILLY-7  4/20/2021   1. Feeling nervous, anxious, or on edge 1   2. Not being able to stop or control worrying 0   3. Worrying too much about different things 1   4. Trouble relaxing 1   5. Being so restless that it is hard to sit still 3   6. Becoming easily annoyed or irritable 1   7. Feeling afraid, as if something awful might happen 0   BILLY-7 Total Score 7   If you checked any problems, how difficult have they made it for you to do your work, take care of things at home, or get along with other people? Somewhat difficult       SUBJECTIVE:  Patient is here today to recheck ADHD/ADD.    Updates since last visit: Going well.  Routine for taking medicine, including time: Patient takes one 20 MG tablet around 7 AM and takes another 20 MG around 9 AM.   Time medicine wears off: 9-10 PM  Issues at school/Work: None  Issues at home: None  Control of symptoms: None    Side effects:  Headaches: No  Stomach aches: Yes- sometimes. May be from the Cymbalta. Gets an upset stomach but nothing unbearable.  Irritability/mood swings: Yes- every couple weeks patient has a mood switch that happen fairly quick. He'll go from feeling good to sad.  Difficulties with sleep: Yes- happens about 1-2 times a week. Falling asleep.  Social withdrawal: Yes- Sometimes.  Unusual movements/tics: Yes- Squinting or blinking a lot and twitching his neck. Has always had this even off the adderall, does happen more often on the medication  though.  Decreased appetite: No    Other concerns: Dry mouth. Normally not a huge deal.    Therapy with Wallace Sabas going well.     ADHD diagnosed in 3rd grade and rediagnosed after high school when also did autism testing. Better motivation and focus with Adderall.     Started Cymbalta 2-3 years ago with Dr. Greenwood. Zoloft, citalopram in past when younger but often forgot medication so didn't seem to help.        No history of Lisa, no visual or auditory hallucinations.  OCD symptoms and thinking of hand washing improved.   No thoughts of paranoia.     Caffeine: 1 cans of pop every 1-2 days and energy drink once a week.  Alcohol: 5 drinks a week  Vapin nicotine   Drug/THC: None     Working for [place] [employer]       Review of Systems   Constitutional, HEENT, cardiovascular, pulmonary, gi and gu systems are negative, except as otherwise noted.      Objective           Vitals:  No vitals were obtained today due to virtual visit.    Physical Exam   GENERAL: Healthy, alert and no distress  EYES: Eyes grossly normal to inspection.  No discharge or erythema, or obvious scleral/conjunctival abnormalities.  RESP: No audible wheeze, cough, or visible cyanosis.  No visible retractions or increased work of breathing.    SKIN: Visible skin clear. No significant rash, abnormal pigmentation or lesions.  NEURO: Cranial nerves grossly intact.  Mentation and speech appropriate for age.  PSYCH: Mentation appears normal, affect normal/bright, judgement and insight intact, normal speech and appearance well-groomed.            Video-Visit Details    Type of service:  Video Visit    Video End Time:10:21 AM    Originating Location (pt. Location): Home    Distant Location (provider location):  [facility]     Platform used for Video Visit: [employer]

## 2021-04-21 ASSESSMENT — ANXIETY QUESTIONNAIRES: GAD7 TOTAL SCORE: 7

## 2021-08-27 ENCOUNTER — OFFICE VISIT (OUTPATIENT)
Dept: FAMILY MEDICINE | Facility: CLINIC | Age: 26
End: 2021-08-27
Payer: COMMERCIAL

## 2021-08-27 VITALS
TEMPERATURE: 97.3 F | WEIGHT: 254.8 LBS | DIASTOLIC BLOOD PRESSURE: 58 MMHG | RESPIRATION RATE: 16 BRPM | HEART RATE: 81 BPM | SYSTOLIC BLOOD PRESSURE: 106 MMHG | OXYGEN SATURATION: 98 % | BODY MASS INDEX: 34.51 KG/M2 | HEIGHT: 72 IN

## 2021-08-27 DIAGNOSIS — F90.2 ATTENTION DEFICIT HYPERACTIVITY DISORDER (ADHD), COMBINED TYPE: Primary | ICD-10-CM

## 2021-08-27 DIAGNOSIS — F41.9 ANXIETY: ICD-10-CM

## 2021-08-27 DIAGNOSIS — F33.2 MAJOR DEPRESSIVE DISORDER, RECURRENT EPISODE, SEVERE WITH ANXIOUS DISTRESS (H): ICD-10-CM

## 2021-08-27 LAB — CREAT UR-MCNC: 344 MG/DL

## 2021-08-27 PROCEDURE — 80307 DRUG TEST PRSMV CHEM ANLYZR: CPT | Performed by: FAMILY MEDICINE

## 2021-08-27 PROCEDURE — 99213 OFFICE O/P EST LOW 20 MIN: CPT | Performed by: FAMILY MEDICINE

## 2021-08-27 PROCEDURE — 82570 ASSAY OF URINE CREATININE: CPT | Mod: 59 | Performed by: FAMILY MEDICINE

## 2021-08-27 RX ORDER — DEXTROAMPHETAMINE SACCHARATE, AMPHETAMINE ASPARTATE, DEXTROAMPHETAMINE SULFATE AND AMPHETAMINE SULFATE 5; 5; 5; 5 MG/1; MG/1; MG/1; MG/1
20 TABLET ORAL DAILY
Qty: 30 TABLET | Refills: 0 | Status: SHIPPED | OUTPATIENT
Start: 2021-08-27 | End: 2021-09-26

## 2021-08-27 RX ORDER — DEXTROAMPHETAMINE SACCHARATE, AMPHETAMINE ASPARTATE, DEXTROAMPHETAMINE SULFATE AND AMPHETAMINE SULFATE 5; 5; 5; 5 MG/1; MG/1; MG/1; MG/1
20 TABLET ORAL DAILY
Qty: 30 TABLET | Refills: 0 | Status: SHIPPED | OUTPATIENT
Start: 2021-09-26 | End: 2021-10-26

## 2021-08-27 RX ORDER — DEXTROAMPHETAMINE SACCHARATE, AMPHETAMINE ASPARTATE, DEXTROAMPHETAMINE SULFATE AND AMPHETAMINE SULFATE 5; 5; 5; 5 MG/1; MG/1; MG/1; MG/1
20 TABLET ORAL DAILY
Qty: 30 TABLET | Refills: 0 | Status: SHIPPED | OUTPATIENT
Start: 2021-10-27 | End: 2021-11-26

## 2021-08-27 RX ORDER — DULOXETIN HYDROCHLORIDE 30 MG/1
90 CAPSULE, DELAYED RELEASE ORAL DAILY
Qty: 90 CAPSULE | Refills: 5 | Status: SHIPPED | OUTPATIENT
Start: 2021-08-27 | End: 2021-12-15

## 2021-08-27 ASSESSMENT — ANXIETY QUESTIONNAIRES
7. FEELING AFRAID AS IF SOMETHING AWFUL MIGHT HAPPEN: SEVERAL DAYS
2. NOT BEING ABLE TO STOP OR CONTROL WORRYING: SEVERAL DAYS
6. BECOMING EASILY ANNOYED OR IRRITABLE: SEVERAL DAYS
GAD7 TOTAL SCORE: 8
3. WORRYING TOO MUCH ABOUT DIFFERENT THINGS: SEVERAL DAYS
1. FEELING NERVOUS, ANXIOUS, OR ON EDGE: NOT AT ALL
IF YOU CHECKED OFF ANY PROBLEMS ON THIS QUESTIONNAIRE, HOW DIFFICULT HAVE THESE PROBLEMS MADE IT FOR YOU TO DO YOUR WORK, TAKE CARE OF THINGS AT HOME, OR GET ALONG WITH OTHER PEOPLE: SOMEWHAT DIFFICULT
5. BEING SO RESTLESS THAT IT IS HARD TO SIT STILL: NEARLY EVERY DAY

## 2021-08-27 ASSESSMENT — PATIENT HEALTH QUESTIONNAIRE - PHQ9
SUM OF ALL RESPONSES TO PHQ QUESTIONS 1-9: 6
5. POOR APPETITE OR OVEREATING: SEVERAL DAYS

## 2021-08-27 ASSESSMENT — MIFFLIN-ST. JEOR: SCORE: 2188.77

## 2021-08-27 NOTE — LETTER
September 3, 2021      Daniel Thomas  [address]   [place] [facility] [place] [address]        Dear ,    We are writing to inform you of your test results.    Urine test shows no adderal medication in your system at the time of the test.    Resulted Orders   Urine Drug Confirmation Panel   Result Value Ref Range    Creatinine Urine mg/dL 344 mg/dL    Narrative    Interpretation:  Absent or none detected  This test was developed and its performance characteristics determined by the [facility],  Special Chemistry Laboratory. It has not been cleared or approved by the FDA. The laboratory is regulated under CLIA as qualified to perform high-complexity testing. This test is used for clinical purposes. It should not be regarded as investigational or for research.   Creatinine random urine   Result Value Ref Range    Creatinine Urine mg/dL 344 mg/dL      Comment:      The reference range has not been established for creatinine in random urines. The results should be integrated into the clinical context for interpretation.       If you have any questions or concerns, please call the clinic at the number listed above.       Sincerely,      Nadeem Hernández MD

## 2021-08-27 NOTE — LETTER
[facility]  08/27/21  Patient: Daniel Thomas  YOB: 1995  Medical Record Number: [id number]                                                                                  Non-Opioid Controlled Substance Agreement    This is an agreement between you and your provider regarding safe and appropriate use of controlled substances prescribed by your care team. Controlled substances are?medicines that can cause physical and mental dependence (abuse).     There are strict laws about having and using these medicines. We here at [facility] are  committed to working with you in your efforts to get better. To support you in this work, we'll help you schedule regular office appointments for medicine refills. If we must cancel or change your appointment for any reason, we'll make sure you have enough medicine to last until your next appointment.     As a Provider, I will:     Listen carefully to your concerns while treating you with respect.     Recommend a treatment plan that I believe is in your best interest and may involve therapies other than medicine.      Talk with you often about the possible benefits and the risk of harm of any medicine that we prescribe for you.    Assess the safety of this medicine and check how well it works.      Provide a plan on how to taper (discontinue or go off) using this medicine if the decision is made to stop its use.      ::  As a Patient, I understand controlled substances:       Are prescribed by my care provider to help me function or work and manage my condition(s).?    Are strong medicines and can cause serious side effects.       Need to be taken exactly as prescribed.?Combining controlled substances with certain medicines or chemicals (such as illegal drugs, alcohol, sedatives, sleeping pills, and benzodiazepines) can be dangerous or even fatal.? If I stop taking my medicines suddenly, I may have severe withdrawal symptoms.      The risks, benefits, and side effects of these medicine(s) were explained to me. I agree that:    1. I will take part in other treatments as advised by my care team. This may be psychiatry or counseling, physical therapy, behavioral therapy, group treatment or a referral to specialist.    2. I will keep all my appointments and understand this is part of the monitoring of controlled substances.?My care team may require an office visit for EVERY controlled substance refill. If I miss appointments or don t follow instructions, my care team may stop my medicine    3. I will take my medicines as prescribed. I will not change the dose or schedule unless my care team tells me to. There will be no refills if I run out early.      4. I may be asked to come to the clinic and complete a urine drug test or complete a pill count. If I don t give a urine sample or participate in a pill count, the care team may stop my medicine.    5. I will only receive controlled substance prescriptions from this clinic. If I am treated by another provider, I will tell them that I am taking controlled substances and that I have a treatment agreement with this provider. I will inform my [facility] care team within one business day if I am given a prescription for any controlled substance by another healthcare provider. My [facility] care team can contact other providers and pharmacists about my use of any medicines.    6. It is up to me to make sure that I don't run out of my medicines on weekends or holidays.?If my care team is willing to refill my prescription without a visit, I must request refills only during office hours. Refills may take up to 3 business days to process. I will use one pharmacy to fill all my controlled substance prescriptions. I will notify the clinic about any changes to my insurance or medicine availability.    7. I am responsible for my prescriptions. If the medicine/prescription is lost, stolen or  destroyed, it will not be replaced.?I also agree not to share controlled substance medicines with anyone.     8. I am aware I should not use any illegal or recreational drugs. I agree not to drink alcohol unless my care team says I can.     9. If I enroll in the [place] Medical Cannabis program, I will tell my care team before my next refill.    10. I will tell my care team right away if I become pregnant, have a new medical problem treated outside of my regular clinic, or have a change in my medicines.     11. I understand that this medicine can affect my thinking, judgment and reaction time.? Alcohol and drugs affect the brain and body, which can affect the safety of my driving. Being under the influence of alcohol or drugs can affect my decision-making, behaviors, personal safety and the safety of others. Driving while impaired (DWI) can occur if a person is driving, operating or in physical control of a car, motorcycle, boat, snowmobile, ATV, motorbike, off-road vehicle or any other motor vehicle ([place] Statute 169A.20). I understand the risk if I choose to drive or operate any vehicle or machinery.    I understand that if I do not follow any of the conditions above, my prescriptions or treatment may be stopped or changed.   I agree that my provider, clinic care team and pharmacy may work with any city, state or federal law enforcement agency that investigates the misuse, sale or other diversion of my controlled medicine. I will allow my provider to discuss my care with, or share a copy of, this agreement with any other treating provider, pharmacy or emergency room where I receive care.     I have read this agreement and have asked questions about anything I did not understand.    ________________________________________________________  Patient Signature - Daniel Thomas     ___________________                   Date     ________________________________________________________  Provider Signature Mitzy Silver  Nelly Betty, MD       ___________________                   Date     ________________________________________________________  Witness Signature (required if provider not present while patient signing)          ___________________                   Date

## 2021-08-27 NOTE — PROGRESS NOTES
Assessment & Plan     Anxiety  Stable, refill  - DULoxetine (CYMBALTA) 30 MG capsule; Take 3 capsules (90 mg) by mouth daily  - Drug Confirmation Panel Urine with Creat - lab collect; Future    Attention deficit hyperactivity disorder (ADHD), combined type  Stable, refill  - amphetamine-dextroamphetamine (ADDERALL) 20 MG tablet; Take 1 tablet (20 mg) by mouth daily  - amphetamine-dextroamphetamine (ADDERALL) 20 MG tablet; Take 1 tablet (20 mg) by mouth daily  - amphetamine-dextroamphetamine (ADDERALL) 20 MG tablet; Take 1 tablet (20 mg) by mouth daily  - Drug Confirmation Panel Urine with Creat - lab collect; Future       BMI:   Estimated body mass index is 34.56 kg/m  as calculated from the following:    Height as of this encounter: 1.829 m (6').    Weight as of this encounter: 115.6 kg (254 lb 12.8 oz).   Weight management plan: Discussed healthy diet and exercise guidelines    See Patient Instructions    No follow-ups on file.    Nadeem Hernández MD  [facility] [place]    Kennedi Sanchez is a 25 year old who presents for the following health issues    HPI     Anxiety Follow-Up    How are you doing with your anxiety since your last visit? Stable    Are you having other symptoms that might be associated with anxiety? No    Have you had a significant life event? No     Are you feeling depressed? Yes:  once in awhile    Do you have any concerns with your use of alcohol or other drugs? No    Social History     Tobacco Use     Smoking status: Former Smoker     Packs/day: 0.00     Types: Cigarettes     Quit date: 12/10/2017     Years since quitting: 3.7     Smokeless tobacco: Never Used     Tobacco comment: patient vapes    Vaping Use     Vaping Use: Never used   Substance Use Topics     Alcohol use: Yes     Comment: socially      Drug use: No     BILLY-7 SCORE 7/3/2020 11/24/2020 4/20/2021   Total Score 20 12 7     PHQ 7/3/2020 11/24/2020 4/20/2021   PHQ-9 Total Score 13 7 5   Q9: Thoughts of  better off dead/self-harm past 2 weeks Not at all Not at all Not at all       Recheck ADHD/ADD.    Updates since last visit: Going good since 4/20/2021.  Routine for taking medicine, including time: Patient states he takes his 20mg tablet every morning around 6:30 am.  Time medicine wears off: 2 PM  Issues at school/Work: None  Issues at home: None  Control of symptoms: Well controlled    Side effects:  Headaches: No  Stomach aches: Yes- sometimes  Irritability/mood swings: Yes- sometimes  Difficulties with sleep: No  Social withdrawal: No  Unusual movements/tics: Yes - with or without the adderall, tapping legs  Decreased appetite: No    Other concerns: Sometimes gets dry eyes/ irritability     Working for [place] [employer]  Caffeine: 1 cans of pop every 1-2 days and energy drink once a week.  Alcohol: 5 drinks a week  Vaping: quit  Drug/THC: None       ADHD diagnosed in 3rd grade and rediagnosed after high school when also did autism testing. Better motivation and focus with Adderall.     Started Cymbalta 2-3 years ago with Dr. Greenwood. Zolnadjat, citalopram in past when younger but often forgot medication so didn't seem to help.      How many servings of fruits and vegetables do you eat daily?  2-3    On average, how many sweetened beverages do you drink each day (Examples: soda, juice, sweet tea, etc.  Do NOT count diet or artificially sweetened beverages)?   0-1    How many days per week do you exercise enough to make your heart beat faster? 5    How many minutes a day do you exercise enough to make your heart beat faster? 30 - 60    How many days per week do you miss taking your medication? 1- sometimes forgets. About once a week.      Review of Systems   Constitutional, HEENT, cardiovascular, pulmonary, gi and gu systems are negative, except as otherwise noted.      Objective    /58   Pulse 81   Temp 97.3  F (36.3  C) (Tympanic)   Resp 16   Ht 1.829 m (6')   Wt 115.6 kg (254 lb 12.8 oz)   SpO2 98%   BMI 34.56  kg/m    Body mass index is 34.56 kg/m .  Physical Exam   GENERAL: healthy, alert and no distress  CV: regular rate and rhythm, normal S1 S2, no S3 or S4, no murmur, click or rub, no peripheral edema and peripheral pulses strong  NEURO: Normal strength and tone, mentation intact and speech normal  PSYCH: mentation appears normal, affect normal/bright

## 2021-08-27 NOTE — LETTER
September 2, 2021      Daniel Thomas  [address]   [place] [address]        Dear ,    We are writing to inform you of your test results.    {results letter list:281722}    Resulted Orders   Urine Drug Confirmation Panel   Result Value Ref Range    Creatinine Urine mg/dL 344 mg/dL    Narrative    Interpretation:  Absent or none detected  This test was developed and its performance characteristics determined by the [facility],  Special Chemistry Laboratory. It has not been cleared or approved by the FDA. The laboratory is regulated under CLIA as qualified to perform high-complexity testing. This test is used for clinical purposes. It should not be regarded as investigational or for research.   Creatinine random urine   Result Value Ref Range    Creatinine Urine mg/dL 344 mg/dL      Comment:      The reference range has not been established for creatinine in random urines. The results should be integrated into the clinical context for interpretation.       If you have any questions or concerns, please call the clinic at the number listed above.       Sincerely,      Nadeem Hernández MD

## 2021-08-28 ASSESSMENT — ANXIETY QUESTIONNAIRES: GAD7 TOTAL SCORE: 8

## 2021-08-31 LAB — CREATININE URINE MG/DL  (SYNCED VALUE): 344 MG/DL

## 2021-10-02 ENCOUNTER — HOSPITAL ENCOUNTER (EMERGENCY)
Facility: CLINIC | Age: 26
Discharge: HOME | End: 2021-10-02
Attending: EMERGENCY MEDICINE | Admitting: EMERGENCY MEDICINE
Payer: COMMERCIAL

## 2021-10-02 VITALS
DIASTOLIC BLOOD PRESSURE: 91 MMHG | BODY MASS INDEX: 33.91 KG/M2 | SYSTOLIC BLOOD PRESSURE: 145 MMHG | OXYGEN SATURATION: 98 % | RESPIRATION RATE: 16 BRPM | WEIGHT: 250 LBS | HEART RATE: 83 BPM | TEMPERATURE: 97 F

## 2021-10-02 DIAGNOSIS — S69.92XA FISH HOOK INJURY OF FINGER OF LEFT HAND, INITIAL ENCOUNTER: ICD-10-CM

## 2021-10-02 PROCEDURE — 99283 EMERGENCY DEPT VISIT LOW MDM: CPT | Mod: 25 | Performed by: EMERGENCY MEDICINE

## 2021-10-02 PROCEDURE — 10120 INC&RMVL FB SUBQ TISS SMPL: CPT | Performed by: EMERGENCY MEDICINE

## 2021-10-02 ASSESSMENT — ENCOUNTER SYMPTOMS
CONSTITUTIONAL NEGATIVE: 1
ROS SKIN COMMENTS: SEE HPI

## 2021-10-03 NOTE — ED PROVIDER NOTES
History     Chief Complaint   Patient presents with     Hand Injury     HPI  Daniel Thomas is a 25 year old male who presents to the emergency department with concerns regarding fishhook injury of the left index finger.  This occurred approximately 1 hour prior to ED arrival.  Patient unable to get the fishhook out of the left index finger.  No injury elsewhere.  Uncertain last tetanus.  Tetanus status reviewed and medical record, and is up-to-date having received this in 2018.  Denies numbness, tingling.  No trouble bending digit.    Allergies:  Allergies   Allergen Reactions     Seasonal Allergies      Runny nose, itchy eyes       Problem List:    Patient Active Problem List    Diagnosis Date Noted     Major depressive disorder, recurrent episode, severe with anxious distress (H) 02/17/2020     Priority: Medium     Moderate episode of recurrent major depressive disorder (H) 12/18/2017     Priority: Medium     Anxiety 08/24/2015     Priority: Medium     OCD (obsessive compulsive disorder) 08/24/2015     Priority: Medium     Tourette's disorder 11/16/2011     Priority: Medium     mild       Behavior problems 02/22/2011     Priority: Medium     ADHD (attention deficit hyperactivity disorder) 02/22/2011     Priority: Medium     He will call in 3 months for refills and I will put 3 months worth of refills at the .  He must be seen at least every 6 months.          Past Medical History:    Past Medical History:   Diagnosis Date     Depressive disorder        Past Surgical History:    Past Surgical History:   Procedure Laterality Date     ADENOIDECTOMY  2001     PE TUBES         Family History:    Family History   Problem Relation Age of Onset     Arthritis Father      Prostate Cancer Maternal Grandfather      Thyroid Disease Paternal Grandmother      Thyroid Disease Mother      Breast Cancer Maternal Grandmother      Cancer Paternal Grandfather         Bladder cancer       Social History:  Marital  Status:  Single [1]  Social History     Tobacco Use     Smoking status: Former Smoker     Packs/day: 0.00     Types: Cigarettes     Quit date: 12/10/2017     Years since quitting: 3.8     Smokeless tobacco: Never Used     Tobacco comment: patient vapes    Vaping Use     Vaping Use: Never used   Substance Use Topics     Alcohol use: Yes     Comment: socially      Drug use: No        Medications:    amphetamine-dextroamphetamine (ADDERALL) 20 MG tablet  [START ON 10/27/2021] amphetamine-dextroamphetamine (ADDERALL) 20 MG tablet  DULoxetine (CYMBALTA) 30 MG capsule          Review of Systems   Constitutional: Negative.    Musculoskeletal:        See HPI   Skin:        See HPI       Physical Exam   BP: (!) 145/91  Pulse: 83  Temp: 97  F (36.1  C)  Resp: 16  Weight: 113.4 kg (250 lb)  SpO2: 98 %      Physical Exam  BP (!) 145/91   Pulse 83   Temp 97  F (36.1  C) (Temporal)   Resp 16   Wt 113.4 kg (250 lb)   SpO2 98%   BMI 33.91 kg/m    General: alert and in no acute distress  Head: atraumatic, normocephalic  Abd: nondistended  Musculoskel/Extremities: Left index finger with fishhook over the dorsum of the PIP of the left index finger.  Distal sensation, capillary refill  Neuro: Patient awake, alert, oriented, speech is fluent, gait is normal  Psychiatric: affect/mood normal, cooperative, normal judgement/insight and memory intact      ED Course        Procedures              Critical Care time:  none               No results found for this or any previous visit (from the past 24 hour(s)).    Medications - No data to display    Assessments & Plan (with Medical Decision Making)  25 year old male presenting to the emergency department with concerns regarding fishhook injury of the left index finger.  This occurred a short time prior to arrival.  Digital block was performed with 0.5% bupivacaine without epinephrine.  Total of 6 cc was injected into the base of the second digit with good anesthesia which resulted.   Subsequently the remaining barbs were cut off of the fishhook, and using a needle  did push the needle through the finger.  No further active bleeding noted.  Normal range of motion of the left index finger, with normal distal perfusion post fishhook removal.  Instructed to keep the area/wound clean.  Be seen if any signs of infection develop.     I have reviewed the nursing notes.    I have reviewed the findings, diagnosis, plan and need for follow up with the patient.       Discharge Medication List as of 10/2/2021  9:50 PM          Final diagnoses:   Fish hook injury of finger of left hand, initial encounter       10/2/2021   [facility] EMERGENCY DEPT     Dannie, Trell Staton MD  10/02/21 [id number]

## 2021-10-03 NOTE — DISCHARGE INSTRUCTIONS
Be seen if any signs of infection develop such as redness, pus, spreading redness, or other concerns develop    Your tetanus is up-to-date having received it in 2018

## 2021-12-15 DIAGNOSIS — F41.9 ANXIETY: ICD-10-CM

## 2021-12-15 RX ORDER — DULOXETIN HYDROCHLORIDE 30 MG/1
CAPSULE, DELAYED RELEASE ORAL
Qty: 90 CAPSULE | Refills: 1 | Status: SHIPPED | OUTPATIENT
Start: 2021-12-15 | End: 2022-01-21

## 2021-12-15 NOTE — TELEPHONE ENCOUNTER
Pending Prescriptions:                       Disp   Refills    DULoxetine (CYMBALTA) 30 MG capsule [Phar*90 cap*4            Sig: TAKE 3 CAPSULES BY MOUTH EVERY DAY    Routing refill request to provider for review/approval because:  Labs out of range:    BP Readings from Last 3 Encounters:   10/02/21 (!) 145/91   08/27/21 106/58   08/26/20 124/62     Dale Boston RN

## 2022-01-08 ENCOUNTER — HEALTH MAINTENANCE LETTER (OUTPATIENT)
Age: 27
End: 2022-01-08

## 2022-01-21 ENCOUNTER — VIRTUAL VISIT (OUTPATIENT)
Dept: FAMILY MEDICINE | Facility: CLINIC | Age: 27
End: 2022-01-21
Payer: COMMERCIAL

## 2022-01-21 DIAGNOSIS — F33.1 MODERATE EPISODE OF RECURRENT MAJOR DEPRESSIVE DISORDER (H): ICD-10-CM

## 2022-01-21 DIAGNOSIS — F41.9 ANXIETY: ICD-10-CM

## 2022-01-21 DIAGNOSIS — F90.2 ATTENTION DEFICIT HYPERACTIVITY DISORDER (ADHD), COMBINED TYPE: Primary | ICD-10-CM

## 2022-01-21 PROBLEM — F33.2 MAJOR DEPRESSIVE DISORDER, RECURRENT EPISODE, SEVERE WITH ANXIOUS DISTRESS (H): Status: RESOLVED | Noted: 2020-02-17 | Resolved: 2022-01-21

## 2022-01-21 PROCEDURE — 99214 OFFICE O/P EST MOD 30 MIN: CPT | Mod: 95 | Performed by: FAMILY MEDICINE

## 2022-01-21 RX ORDER — DEXTROAMPHETAMINE SACCHARATE, AMPHETAMINE ASPARTATE, DEXTROAMPHETAMINE SULFATE AND AMPHETAMINE SULFATE 5; 5; 5; 5 MG/1; MG/1; MG/1; MG/1
20 TABLET ORAL DAILY
Qty: 30 TABLET | Refills: 0 | Status: SHIPPED | OUTPATIENT
Start: 2022-01-21 | End: 2022-02-20

## 2022-01-21 RX ORDER — DEXTROAMPHETAMINE SACCHARATE, AMPHETAMINE ASPARTATE, DEXTROAMPHETAMINE SULFATE AND AMPHETAMINE SULFATE 5; 5; 5; 5 MG/1; MG/1; MG/1; MG/1
20 TABLET ORAL DAILY
Qty: 30 TABLET | Refills: 0 | Status: CANCELLED | OUTPATIENT
Start: 2022-01-21

## 2022-01-21 RX ORDER — DEXTROAMPHETAMINE SACCHARATE, AMPHETAMINE ASPARTATE, DEXTROAMPHETAMINE SULFATE AND AMPHETAMINE SULFATE 5; 5; 5; 5 MG/1; MG/1; MG/1; MG/1
20 TABLET ORAL DAILY
Qty: 30 TABLET | Refills: 0 | Status: SHIPPED | OUTPATIENT
Start: 2022-02-21 | End: 2022-03-23

## 2022-01-21 RX ORDER — DEXTROAMPHETAMINE SACCHARATE, AMPHETAMINE ASPARTATE, DEXTROAMPHETAMINE SULFATE AND AMPHETAMINE SULFATE 5; 5; 5; 5 MG/1; MG/1; MG/1; MG/1
20 TABLET ORAL DAILY
Qty: 30 TABLET | Refills: 0 | Status: SHIPPED | OUTPATIENT
Start: 2022-03-24 | End: 2022-04-23

## 2022-01-21 RX ORDER — DULOXETIN HYDROCHLORIDE 30 MG/1
90 CAPSULE, DELAYED RELEASE ORAL DAILY
Qty: 90 CAPSULE | Refills: 4 | Status: SHIPPED | OUTPATIENT
Start: 2022-01-21 | End: 2022-07-08

## 2022-01-21 NOTE — PROGRESS NOTES
Daniel is a 26 year old who is being evaluated via a billable telephone visit.      What phone number would you like to be contacted at? [phone]  How would you like to obtain your AVS? MyChart    Assessment & Plan     Attention deficit hyperactivity disorder (ADHD), combined type  Stable, refills sent  Recheck in clinic next time needing refills.  - amphetamine-dextroamphetamine (ADDERALL) 20 MG tablet; Take 1 tablet (20 mg) by mouth daily  - amphetamine-dextroamphetamine (ADDERALL) 20 MG tablet; Take 1 tablet (20 mg) by mouth daily  - amphetamine-dextroamphetamine (ADDERALL) 20 MG tablet; Take 1 tablet (20 mg) by mouth daily    Major depressive disorder, recurrent episode, moderate (H) stable, refill  &  Anxiety: stable refill  -     DULoxetine (CYMBALTA) 30 MG capsule; Take 3 capsules (90 mg) by mouth daily            See Patient Instructions    No follow-ups on file.    Nadeem Hernández MD  [facility]   Daniel is a 26 year old who presents for the following health issues     HPI     Recheck ADHD/ADD.    Updates since last visit: going good since 8/27/2021  Routine for taking medicine, including time: Patient states he takes his 20mg tablet every morning around 6:30a, skips the weekends.  Time medicine wears off: 3-4 pm  Issues at school/Work: None  Issues at home: None  Control of symptoms: Well controlled    Side effects:  Headaches: No  Stomach aches: Yes only if not taken with food.  Irritability/mood swings: No  Difficulties with sleep: No  Social withdrawal: No  Unusual movements/tics: No  Decreased appetite: Yes- Slightly    Other concerns: None    Working for [place] [employer]  Caffeine: 1 cans of pop every 1-2 days and energy drink once a week.  Alcohol: 5 drinks a week  Vaping: quit  Drug/THC: None        ADHD diagnosed in 3rd grade and rediagnosed after high school when also did autism testing. Better motivation and focus with Adderall.     Started Cymbalta 2-3 years ago  with Dr. Greenwood. Veda, citalopram in past when younger but often forgot medication so didn't seem to help.      How many servings of fruits and vegetables do you eat daily?  0-1    On average, how many sweetened beverages do you drink each day (Examples: soda, juice, sweet tea, etc.  Do NOT count diet or artificially sweetened beverages)?   0-1    How many days per week do you exercise enough to make your heart beat faster? 5    How many minutes a day do you exercise enough to make your heart beat faster? 60 or more    How many days per week do you miss taking your medication? 0      Review of Systems   Constitutional, HEENT, cardiovascular, pulmonary, gi and gu systems are negative, except as otherwise noted.      Objective           Vitals:  No vitals were obtained today due to virtual visit.    Physical Exam   healthy, alert and no distress  PSYCH: Alert and oriented times 3; coherent speech, normal   rate and volume, able to articulate logical thoughts, able   to abstract reason, no tangential thoughts, no hallucinations   or delusions  His affect is normal and pleasant  RESP: No cough, no audible wheezing, able to talk in full sentences  Remainder of exam unable to be completed due to telephone visits            Phone call duration: 6 minutes

## 2022-05-18 ENCOUNTER — E-VISIT (OUTPATIENT)
Dept: FAMILY MEDICINE | Facility: CLINIC | Age: 27
End: 2022-05-18
Payer: COMMERCIAL

## 2022-05-18 DIAGNOSIS — F90.2 ATTENTION DEFICIT HYPERACTIVITY DISORDER (ADHD), COMBINED TYPE: Primary | ICD-10-CM

## 2022-05-18 PROCEDURE — 99421 OL DIG E/M SVC 5-10 MIN: CPT | Performed by: FAMILY MEDICINE

## 2022-05-19 RX ORDER — DEXTROAMPHETAMINE SACCHARATE, AMPHETAMINE ASPARTATE, DEXTROAMPHETAMINE SULFATE AND AMPHETAMINE SULFATE 5; 5; 5; 5 MG/1; MG/1; MG/1; MG/1
20 TABLET ORAL DAILY
Qty: 30 TABLET | Refills: 0 | Status: SHIPPED | OUTPATIENT
Start: 2022-07-20 | End: 2022-08-19

## 2022-05-19 RX ORDER — DEXTROAMPHETAMINE SACCHARATE, AMPHETAMINE ASPARTATE, DEXTROAMPHETAMINE SULFATE AND AMPHETAMINE SULFATE 5; 5; 5; 5 MG/1; MG/1; MG/1; MG/1
20 TABLET ORAL DAILY
Qty: 30 TABLET | Refills: 0 | Status: SHIPPED | OUTPATIENT
Start: 2022-06-19 | End: 2022-07-19

## 2022-05-19 RX ORDER — DEXTROAMPHETAMINE SACCHARATE, AMPHETAMINE ASPARTATE, DEXTROAMPHETAMINE SULFATE AND AMPHETAMINE SULFATE 5; 5; 5; 5 MG/1; MG/1; MG/1; MG/1
20 TABLET ORAL DAILY
Qty: 30 TABLET | Refills: 0 | Status: SHIPPED | OUTPATIENT
Start: 2022-05-19 | End: 2022-06-18

## 2022-05-19 NOTE — PATIENT INSTRUCTIONS
Thank you for choosing us for your care. I have placed an order for a prescription so that you can start treatment. View your full visit summary for details by clicking on the link below. Your pharmacist will able to address any questions you may have about the medication.     If you're not feeling better within 5-7 days, please schedule an appointment.  You can schedule an appointment right here in [facility], or call [phone]  If the visit is for the same symptoms as your eVisit, we'll refund the cost of your eVisit if seen within seven days.

## 2022-07-06 DIAGNOSIS — F33.1 MODERATE EPISODE OF RECURRENT MAJOR DEPRESSIVE DISORDER (H): ICD-10-CM

## 2022-07-06 DIAGNOSIS — F41.9 ANXIETY: ICD-10-CM

## 2022-07-06 NOTE — TELEPHONE ENCOUNTER
"Requested Prescriptions   Pending Prescriptions Disp Refills    DULoxetine (CYMBALTA) 30 MG capsule 90 capsule 4     Sig: Take 3 capsules (90 mg) by mouth daily        Serotonin-Norepinephrine Reuptake Inhibitors  Failed - 7/6/2022  2:01 PM        Failed - Blood pressure under 140/90 in past 12 months       BP Readings from Last 3 Encounters:   10/02/21 (!) 145/91   08/27/21 106/58   08/26/20 124/62                 Failed - PHQ-9 score of less than 5 in past 6 months     Please review last PHQ-9 score.           Passed - Medication is active on med list        Passed - Patient is age 18 or older        Passed - Recent (6 mo) or future (30 days) visit within the authorizing provider's specialty     Patient had office visit in the last 6 months or has a visit in the next 30 days with authorizing provider or within the authorizing provider's specialty.  See \"Patient Info\" tab in inbasket, or \"Choose Columns\" in Meds & Orders section of the refill encounter.                  "

## 2022-07-08 RX ORDER — DULOXETIN HYDROCHLORIDE 30 MG/1
90 CAPSULE, DELAYED RELEASE ORAL DAILY
Qty: 90 CAPSULE | Refills: 1 | Status: SHIPPED | OUTPATIENT
Start: 2022-07-08 | End: 2022-08-31

## 2022-08-15 ENCOUNTER — MYC MEDICAL ADVICE (OUTPATIENT)
Dept: FAMILY MEDICINE | Facility: CLINIC | Age: 27
End: 2022-08-15

## 2022-08-15 DIAGNOSIS — F90.2 ATTENTION DEFICIT HYPERACTIVITY DISORDER (ADHD), COMBINED TYPE: Primary | ICD-10-CM

## 2022-08-15 RX ORDER — DEXTROAMPHETAMINE SACCHARATE, AMPHETAMINE ASPARTATE, DEXTROAMPHETAMINE SULFATE AND AMPHETAMINE SULFATE 5; 5; 5; 5 MG/1; MG/1; MG/1; MG/1
20 TABLET ORAL DAILY
Qty: 30 TABLET | Refills: 0 | Status: SHIPPED | OUTPATIENT
Start: 2022-09-15 | End: 2022-10-15

## 2022-08-15 RX ORDER — DEXTROAMPHETAMINE SACCHARATE, AMPHETAMINE ASPARTATE, DEXTROAMPHETAMINE SULFATE AND AMPHETAMINE SULFATE 5; 5; 5; 5 MG/1; MG/1; MG/1; MG/1
20 TABLET ORAL DAILY
Qty: 30 TABLET | Refills: 0 | Status: SHIPPED | OUTPATIENT
Start: 2022-08-15 | End: 2022-09-14

## 2022-09-16 ENCOUNTER — VIRTUAL VISIT (OUTPATIENT)
Dept: FAMILY MEDICINE | Facility: CLINIC | Age: 27
End: 2022-09-16
Payer: COMMERCIAL

## 2022-09-16 DIAGNOSIS — F90.2 ATTENTION DEFICIT HYPERACTIVITY DISORDER (ADHD), COMBINED TYPE: Primary | ICD-10-CM

## 2022-09-16 DIAGNOSIS — F33.1 MODERATE EPISODE OF RECURRENT MAJOR DEPRESSIVE DISORDER (H): ICD-10-CM

## 2022-09-16 DIAGNOSIS — F41.9 ANXIETY: ICD-10-CM

## 2022-09-16 PROCEDURE — 99214 OFFICE O/P EST MOD 30 MIN: CPT | Mod: 95 | Performed by: FAMILY MEDICINE

## 2022-09-16 RX ORDER — DEXTROAMPHETAMINE SACCHARATE, AMPHETAMINE ASPARTATE, DEXTROAMPHETAMINE SULFATE AND AMPHETAMINE SULFATE 5; 5; 5; 5 MG/1; MG/1; MG/1; MG/1
20 TABLET ORAL DAILY
Qty: 30 TABLET | Refills: 0 | Status: SHIPPED | OUTPATIENT
Start: 2022-10-17 | End: 2022-11-16

## 2022-09-16 RX ORDER — DEXTROAMPHETAMINE SACCHARATE, AMPHETAMINE ASPARTATE, DEXTROAMPHETAMINE SULFATE AND AMPHETAMINE SULFATE 5; 5; 5; 5 MG/1; MG/1; MG/1; MG/1
20 TABLET ORAL DAILY
Qty: 30 TABLET | Refills: 0 | Status: SHIPPED | OUTPATIENT
Start: 2022-09-16 | End: 2022-10-16

## 2022-09-16 RX ORDER — DEXTROAMPHETAMINE SACCHARATE, AMPHETAMINE ASPARTATE, DEXTROAMPHETAMINE SULFATE AND AMPHETAMINE SULFATE 5; 5; 5; 5 MG/1; MG/1; MG/1; MG/1
20 TABLET ORAL DAILY
Qty: 30 TABLET | Refills: 0 | Status: SHIPPED | OUTPATIENT
Start: 2022-11-17 | End: 2022-12-17

## 2022-09-16 RX ORDER — DULOXETIN HYDROCHLORIDE 30 MG/1
90 CAPSULE, DELAYED RELEASE ORAL DAILY
Qty: 90 CAPSULE | Refills: 5 | Status: SHIPPED | OUTPATIENT
Start: 2022-09-16 | End: 2023-04-19

## 2022-09-16 ASSESSMENT — ANXIETY QUESTIONNAIRES
2. NOT BEING ABLE TO STOP OR CONTROL WORRYING: SEVERAL DAYS
6. BECOMING EASILY ANNOYED OR IRRITABLE: NOT AT ALL
1. FEELING NERVOUS, ANXIOUS, OR ON EDGE: NOT AT ALL
7. FEELING AFRAID AS IF SOMETHING AWFUL MIGHT HAPPEN: NOT AT ALL
IF YOU CHECKED OFF ANY PROBLEMS ON THIS QUESTIONNAIRE, HOW DIFFICULT HAVE THESE PROBLEMS MADE IT FOR YOU TO DO YOUR WORK, TAKE CARE OF THINGS AT HOME, OR GET ALONG WITH OTHER PEOPLE: NOT DIFFICULT AT ALL
GAD7 TOTAL SCORE: 5
GAD7 TOTAL SCORE: 5
5. BEING SO RESTLESS THAT IT IS HARD TO SIT STILL: NEARLY EVERY DAY
3. WORRYING TOO MUCH ABOUT DIFFERENT THINGS: SEVERAL DAYS

## 2022-09-16 ASSESSMENT — PATIENT HEALTH QUESTIONNAIRE - PHQ9
SUM OF ALL RESPONSES TO PHQ QUESTIONS 1-9: 5
5. POOR APPETITE OR OVEREATING: NOT AT ALL

## 2022-09-16 NOTE — PROGRESS NOTES
Daniel is a 26 year old who is being evaluated via a billable video visit.      How would you like to obtain your AVS? MyChart  If the video visit is dropped, the invitation should be resent by: Send to e-mail at: [email or web address]  Will anyone else be joining your video visit? No        Assessment & Plan     Anxiety  Stable, refill  - DULoxetine (CYMBALTA) 30 MG capsule; Take 3 capsules (90 mg) by mouth daily    Moderate episode of recurrent major depressive disorder (H)  Stable, refill  - DULoxetine (CYMBALTA) 30 MG capsule; Take 3 capsules (90 mg) by mouth daily    Attention deficit hyperactivity disorder (ADHD), combined type  Stable, refill.  - amphetamine-dextroamphetamine (ADDERALL) 20 MG tablet; Take 1 tablet (20 mg) by mouth daily for 30 days  - amphetamine-dextroamphetamine (ADDERALL) 20 MG tablet; Take 1 tablet (20 mg) by mouth daily for 30 days  - amphetamine-dextroamphetamine (ADDERALL) 20 MG tablet; Take 1 tablet (20 mg) by mouth daily for 30 days       See Patient Instructions    Return in about 3 months (around 12/16/2022) for Follow up, with me, in person.    Nadeem Hernández MD  [facility]    Subjective   Daniel is a 26 year old, presenting for the following health issues:  No chief complaint on file.      HPI   ADHD:  Adderall 20mg daily  No concerns.  No side effects except dry eyes.  Helping with focus.  Diagnosed as a child age 12 3rd grade and then re-did diagnosis through [facility] age 19.    Depression and Anxiety Follow-Up    How are you doing with your depression since your last visit? Improved     How are you doing with your anxiety since your last visit?  Improved     Are you having other symptoms that might be associated with depression or anxiety? No    Have you had a significant life event? No     Do you have any concerns with your use of alcohol or other drugs? No  Switched working to [employer] in [place].  Has Tourette's and OCD.  Tourette's not a big  issue, doesn't have when focused at work.  Social History     Tobacco Use     Smoking status: Former Smoker     Packs/day: 0.00     Types: Cigarettes     Quit date: 12/10/2017     Years since quittin.7     Smokeless tobacco: Never Used     Tobacco comment: patient vapes    Vaping Use     Vaping Use: Never used   Substance Use Topics     Alcohol use: Yes     Comment: socially      Drug use: No     PHQ 2021   PHQ-9 Total Score 6 8 5   Q9: Thoughts of better off dead/self-harm past 2 weeks Several days Not at all Not at all     BILLY-7 SCORE 2021   Total Score 7 8 5     Last PHQ-9 2022   1.  Little interest or pleasure in doing things 1   2.  Feeling down, depressed, or hopeless 0   3.  Trouble falling or staying asleep, or sleeping too much 0   4.  Feeling tired or having little energy 0   5.  Poor appetite or overeating 1   6.  Feeling bad about yourself 0   7.  Trouble concentrating 0   8.  Moving slowly or restless 3   Q9: Thoughts of better off dead/self-harm past 2 weeks 0   PHQ-9 Total Score 5   Difficulty at work, home, or with people Not difficult at all     BILLY-7  2022   1. Feeling nervous, anxious, or on edge 0   2. Not being able to stop or control worrying 1   3. Worrying too much about different things 1   4. Trouble relaxing 0   5. Being so restless that it is hard to sit still 3   6. Becoming easily annoyed or irritable 0   7. Feeling afraid, as if something awful might happen 0   BILLY-7 Total Score 5   If you checked any problems, how difficult have they made it for you to do your work, take care of things at home, or get along with other people? Not difficult at all       Suicide Assessment Five-step Evaluation and Treatment (SAFE-T)    How many servings of fruits and vegetables do you eat daily?  0-1    On average, how many sweetened beverages do you drink each day (Examples: soda, juice, sweet tea, etc.  Do NOT count diet or artificially  sweetened beverages)?   3    How many days per week do you exercise enough to make your heart beat faster? 3 or less    How many minutes a day do you exercise enough to make your heart beat faster? 20 - 29- 30 min  How many days per week do you miss taking your medication? Weekend he will sleep in longer and miss morning time    What makes it hard for you to take your medications?  sleeping in      Review of Systems   Constitutional, HEENT, cardiovascular, pulmonary, gi and gu systems are negative, except as otherwise noted.      Objective           Vitals:  No vitals were obtained today due to virtual visit.    Physical Exam   GENERAL: Healthy, alert and no distress  EYES: Eyes grossly normal to inspection.  No discharge or erythema, or obvious scleral/conjunctival abnormalities.  RESP: No audible wheeze, cough, or visible cyanosis.  No visible retractions or increased work of breathing.    SKIN: Visible skin clear. No significant rash, abnormal pigmentation or lesions.  NEURO: Cranial nerves grossly intact.  Mentation and speech appropriate for age.  PSYCH: Mentation appears normal, affect normal/bright, judgement and insight intact, normal speech and appearance well-groomed.            Video-Visit Details    Video Start Time: 4:25 PM    Type of service:  Video Visit    Video End Time:4:35 PM    Originating Location (pt. Location): Home    Distant Location (provider location):  [facility]     Platform used for Video Visit: Meridian-[employer]

## 2023-01-05 NOTE — TELEPHONE ENCOUNTER
Reached out to Daniel to update his treatment plan and confirm discharge date from day treatment.He was not able to talk, so made a plan to talk tomorrow after group.  
05-Jan-2023 08:04

## 2023-01-20 ENCOUNTER — OFFICE VISIT (OUTPATIENT)
Dept: FAMILY MEDICINE | Facility: CLINIC | Age: 28
End: 2023-01-20
Payer: COMMERCIAL

## 2023-01-20 VITALS
SYSTOLIC BLOOD PRESSURE: 104 MMHG | WEIGHT: 254.6 LBS | RESPIRATION RATE: 16 BRPM | HEIGHT: 71 IN | TEMPERATURE: 96.4 F | BODY MASS INDEX: 35.64 KG/M2 | HEART RATE: 64 BPM | OXYGEN SATURATION: 95 % | DIASTOLIC BLOOD PRESSURE: 68 MMHG

## 2023-01-20 DIAGNOSIS — Z11.3 SCREEN FOR STD (SEXUALLY TRANSMITTED DISEASE): ICD-10-CM

## 2023-01-20 DIAGNOSIS — F33.1 MODERATE EPISODE OF RECURRENT MAJOR DEPRESSIVE DISORDER (H): ICD-10-CM

## 2023-01-20 DIAGNOSIS — Z00.00 ROUTINE GENERAL MEDICAL EXAMINATION AT A HEALTH CARE FACILITY: Primary | ICD-10-CM

## 2023-01-20 PROCEDURE — 36415 COLL VENOUS BLD VENIPUNCTURE: CPT | Performed by: FAMILY MEDICINE

## 2023-01-20 PROCEDURE — 99395 PREV VISIT EST AGE 18-39: CPT | Mod: 25 | Performed by: FAMILY MEDICINE

## 2023-01-20 PROCEDURE — 87389 HIV-1 AG W/HIV-1&-2 AB AG IA: CPT | Performed by: FAMILY MEDICINE

## 2023-01-20 PROCEDURE — 87491 CHLMYD TRACH DNA AMP PROBE: CPT | Performed by: FAMILY MEDICINE

## 2023-01-20 PROCEDURE — 86695 HERPES SIMPLEX TYPE 1 TEST: CPT | Performed by: FAMILY MEDICINE

## 2023-01-20 PROCEDURE — 87591 N.GONORRHOEAE DNA AMP PROB: CPT | Performed by: FAMILY MEDICINE

## 2023-01-20 PROCEDURE — 90471 IMMUNIZATION ADMIN: CPT | Performed by: FAMILY MEDICINE

## 2023-01-20 PROCEDURE — 86696 HERPES SIMPLEX TYPE 2 TEST: CPT | Performed by: FAMILY MEDICINE

## 2023-01-20 PROCEDURE — 90651 9VHPV VACCINE 2/3 DOSE IM: CPT | Performed by: FAMILY MEDICINE

## 2023-01-20 PROCEDURE — 99213 OFFICE O/P EST LOW 20 MIN: CPT | Mod: 25 | Performed by: FAMILY MEDICINE

## 2023-01-20 PROCEDURE — 86780 TREPONEMA PALLIDUM: CPT | Performed by: FAMILY MEDICINE

## 2023-01-20 ASSESSMENT — ENCOUNTER SYMPTOMS
EYE PAIN: 0
MYALGIAS: 0
FEVER: 0
PALPITATIONS: 0
HEMATURIA: 0
DYSURIA: 0
NERVOUS/ANXIOUS: 0
SORE THROAT: 0
PARESTHESIAS: 0
ARTHRALGIAS: 0
HEARTBURN: 0
DIARRHEA: 0
WEAKNESS: 0
FREQUENCY: 0
DIZZINESS: 0
NAUSEA: 0
HEMATOCHEZIA: 0
CONSTIPATION: 0
JOINT SWELLING: 0
SHORTNESS OF BREATH: 0
HEADACHES: 0
CHILLS: 0
COUGH: 0
ABDOMINAL PAIN: 0

## 2023-01-20 NOTE — PROGRESS NOTES
SUBJECTIVE:   CC: Daniel is an 27 year old who presents for preventative health visit.     Patient has been advised of split billing requirements and indicates understanding: Yes  Healthy Habits:     Getting at least 3 servings of Calcium per day:  NO    Bi-annual eye exam:  Yes    Dental care twice a year:  Yes    Sleep apnea or symptoms of sleep apnea:  None    Diet:  Regular (no restrictions)    Frequency of exercise:  None    Taking medications regularly:  Yes    Medication side effects:  Not applicable    PHQ-2 Total Score: 1    Additional concerns today:  No     Patient would still like to discuss the following concern(s):  1. Std testing       Today's PHQ-2 Score:   PHQ-2 (  [employer]) 2023   Q1: Little interest or pleasure in doing things 1   Q2: Feeling down, depressed or hopeless 0   PHQ-2 Score 1   Q1: Little interest or pleasure in doing things Several days   Q2: Feeling down, depressed or hopeless Not at all   PHQ-2 Score 1       Have you ever done Advance Care Planning? (For example, a Health Directive, POLST, or a discussion with a medical provider or your loved ones about your wishes): No, advance care planning information given to patient to review.  Patient declined advance care planning discussion at this time.    Social History     Tobacco Use     Smoking status: Former     Packs/day: 0.00     Types: Cigarettes     Quit date: 12/10/2017     Years since quittin.1     Smokeless tobacco: Never     Tobacco comments:     patient vapes    Substance Use Topics     Alcohol use: Yes     Comment: socially          Alcohol Use 2023   Prescreen: >3 drinks/day or >7 drinks/week? No       Last PSA: No results found for: PSA    Reviewed orders with patient. Reviewed health maintenance and updated orders accordingly - Yes    Reviewed and updated as needed this visit by clinical staff   Tobacco  Allergies  Meds              Reviewed and updated as needed this visit by Provider                "  Past Medical History:   Diagnosis Date     Depressive disorder       Past Surgical History:   Procedure Laterality Date     ADENOIDECTOMY  2001     PE TUBES       1. Routine physical exam    2. Bumps: On penis.  Ongoing for a long time.  Noticed some difference in bumps being larger.  Unsure if it is red.  Bottom of shaft.  Patient last intercourse was last year.  Patient was not wearing protection.   No penile discharge.  Mild dysuria at the end after urination which occurs one-twice a month.       Review of Systems   Constitutional: Negative for chills and fever.   HENT: Negative for congestion, ear pain, hearing loss and sore throat.    Eyes: Negative for pain and visual disturbance.   Respiratory: Negative for cough and shortness of breath.    Cardiovascular: Negative for chest pain, palpitations and peripheral edema.   Gastrointestinal: Negative for abdominal pain, constipation, diarrhea, heartburn, hematochezia and nausea.   Genitourinary: Negative for dysuria, frequency, genital sores, hematuria and urgency.   Musculoskeletal: Negative for arthralgias, joint swelling and myalgias.   Skin: Negative for rash.   Neurological: Negative for dizziness, weakness, headaches and paresthesias.   Psychiatric/Behavioral: Negative for mood changes. The patient is not nervous/anxious.        OBJECTIVE:   /68   Pulse 64   Temp (!) 96.4  F (35.8  C) (Tympanic)   Resp 16   Ht 1.81 m (5' 11.26\")   Wt 115.5 kg (254 lb 9.6 oz)   SpO2 95%   BMI 35.25 kg/m      Physical Exam  Constitutional:       General: He is not in acute distress.  HENT:      Head: Normocephalic and atraumatic.      Right Ear: External ear normal.      Left Ear: External ear normal.      Nose: Nose normal.      Mouth/Throat:      Pharynx: No oropharyngeal exudate.   Eyes:      General:         Right eye: No discharge.         Left eye: No discharge.      Conjunctiva/sclera: Conjunctivae normal.      Pupils: Pupils are equal, round, and reactive " to light.   Neck:      Thyroid: No thyromegaly.      Trachea: No tracheal deviation.   Cardiovascular:      Rate and Rhythm: Normal rate and regular rhythm.      Heart sounds: Normal heart sounds. No murmur heard.  Pulmonary:      Effort: No respiratory distress.      Breath sounds: Normal breath sounds. No wheezing.   Chest:      Chest wall: No tenderness.   Abdominal:      General: There is no distension.      Palpations: Abdomen is soft. There is no mass.      Tenderness: There is no abdominal tenderness. There is no guarding.   Genitourinary:     Comments: : No suspicious lesions or masses appreciated on penile shaft or head.  Normal testicles  Musculoskeletal:         General: Normal range of motion.      Cervical back: Normal range of motion and neck supple.   Lymphadenopathy:      Cervical: No cervical adenopathy.   Skin:     General: Skin is warm.      Findings: No erythema or rash.   Neurological:      Mental Status: He is alert and oriented to person, place, and time.      Cranial Nerves: No cranial nerve deficit.      Coordination: Coordination normal.         ASSESSMENT/PLAN:   1. Routine general medical examination at a health care facility  - Human Papilloma Virus Vaccine (Gardasil 9) 3 Dose IM    2. Moderate episode of recurrent major depressive disorder (H)  Stable.  Continue with cymbalta    3. Screen for STD (sexually transmitted disease)  Stressed the importance of safe sexual practices  - Neisseria gonorrhoeae PCR; Future  - Chlamydia trachomatis PCR; Future  - Treponema Abs w Reflex to RPR and Titer; Future  - HIV Antigen Antibody Combo; Future  - Herpes Simplex Virus 1 and 2 IgG; Future  - Herpes Simplex Virus 1 and 2 IgG  - HIV Antigen Antibody Combo  - Treponema Abs w Reflex to RPR and Titer  - Chlamydia trachomatis PCR  - Neisseria gonorrhoeae PCR    Patient has been advised of split billing requirements and indicates understanding: Yes      COUNSELING:   Reviewed preventive health  counseling, as reflected in patient instructions       Regular exercise       Healthy diet/nutrition        He reports that he quit smoking about 5 years ago. His smoking use included cigarettes. He has never used smokeless tobacco.            DO VISH Rob [facility]

## 2023-01-20 NOTE — PATIENT INSTRUCTIONS
Davonte Sanchez,    Thank you for allowing [facility] to manage your care.    I ordered some blood work, please go to the laboratory to get your laboratory studies.    For your convenience, test results are released as soon as they are available  Please allow 1-2 business days for me to send you a comment about your results.  If not done so, I encourage you to login into [employer] [email or web address] to review your results in real time.     If you have any questions or concerns, please feel free to call us at [phone].    Sincerely,    Dr. Vance    Did you know?      You can schedule a video visit for follow-up appointments as well as future appointments for certain conditions.  Please see the below link.     [email or web address]    If you have not already done so,  I encourage you to sign up for [employer] ([email or web address]).  This will allow you to review your results, securely communicate with a provider, and schedule virtual visits as well.        Preventive Health Recommendations  Male Ages 21 - 25     Yearly exam:             See your health care provider every year in order to  o   Review health changes.   o   Discuss preventive care.    o   Review your medicines if your doctor has prescribed any.  You should be tested each year for STDs (sexually transmitted diseases).   Talk to your provider about cholesterol testing.    If you are at risk for diabetes, you should have a diabetes test (fasting glucose).    Shots: Get a flu shot each year. Get a tetanus shot every 10 years.     Nutrition:  Eat at least 5 servings of fruits and vegetables daily.   Eat whole-grain bread, whole-wheat pasta and brown rice instead of white grains and rice.   Get adequate calcium and Vitamin D.     Lifestyle  Exercise for at least 150 minutes a week (30 minutes a day, 5 days a week). This will help you control your weight and prevent disease.   Limit alcohol  to one drink per day.   No smoking.   Wear sunscreen to prevent skin cancer.   See your dentist every six months for an exam and cleaning.

## 2023-01-21 LAB
C TRACH DNA SPEC QL NAA+PROBE: NEGATIVE
N GONORRHOEA DNA SPEC QL NAA+PROBE: NEGATIVE
T PALLIDUM AB SER QL: NONREACTIVE

## 2023-01-22 LAB — HIV 1+2 AB+HIV1 P24 AG SERPL QL IA: NONREACTIVE

## 2023-01-23 LAB
HSV1 IGG SERPL QL IA: 0.78 INDEX
HSV1 IGG SERPL QL IA: NORMAL
HSV2 IGG SERPL QL IA: 0.7 INDEX
HSV2 IGG SERPL QL IA: NORMAL

## 2023-02-10 ENCOUNTER — MYC MEDICAL ADVICE (OUTPATIENT)
Dept: FAMILY MEDICINE | Facility: CLINIC | Age: 28
End: 2023-02-10
Payer: COMMERCIAL

## 2023-02-10 DIAGNOSIS — F90.2 ATTENTION DEFICIT HYPERACTIVITY DISORDER (ADHD), COMBINED TYPE: ICD-10-CM

## 2023-02-10 RX ORDER — DEXTROAMPHETAMINE SACCHARATE, AMPHETAMINE ASPARTATE, DEXTROAMPHETAMINE SULFATE AND AMPHETAMINE SULFATE 5; 5; 5; 5 MG/1; MG/1; MG/1; MG/1
20 TABLET ORAL DAILY
Qty: 30 TABLET | Refills: 0 | Status: SHIPPED | OUTPATIENT
Start: 2023-02-10 | End: 2023-03-12

## 2023-02-10 RX ORDER — DEXTROAMPHETAMINE SACCHARATE, AMPHETAMINE ASPARTATE, DEXTROAMPHETAMINE SULFATE AND AMPHETAMINE SULFATE 5; 5; 5; 5 MG/1; MG/1; MG/1; MG/1
20 TABLET ORAL DAILY
Qty: 30 TABLET | Refills: 0 | Status: SHIPPED | OUTPATIENT
Start: 2023-03-13 | End: 2023-04-12

## 2023-02-10 RX ORDER — DEXTROAMPHETAMINE SACCHARATE, AMPHETAMINE ASPARTATE, DEXTROAMPHETAMINE SULFATE AND AMPHETAMINE SULFATE 5; 5; 5; 5 MG/1; MG/1; MG/1; MG/1
TABLET ORAL
Qty: 30 TABLET | Refills: 0 | OUTPATIENT
Start: 2023-02-10

## 2023-02-10 RX ORDER — DEXTROAMPHETAMINE SACCHARATE, AMPHETAMINE ASPARTATE, DEXTROAMPHETAMINE SULFATE AND AMPHETAMINE SULFATE 5; 5; 5; 5 MG/1; MG/1; MG/1; MG/1
20 TABLET ORAL DAILY
Qty: 30 TABLET | Refills: 0 | Status: SHIPPED | OUTPATIENT
Start: 2023-04-13 | End: 2023-05-13

## 2023-02-13 ENCOUNTER — OFFICE VISIT (OUTPATIENT)
Dept: FAMILY MEDICINE | Facility: CLINIC | Age: 28
End: 2023-02-13
Payer: COMMERCIAL

## 2023-02-13 VITALS
WEIGHT: 256.6 LBS | BODY MASS INDEX: 34.75 KG/M2 | RESPIRATION RATE: 16 BRPM | DIASTOLIC BLOOD PRESSURE: 78 MMHG | TEMPERATURE: 97.8 F | SYSTOLIC BLOOD PRESSURE: 116 MMHG | HEIGHT: 72 IN | OXYGEN SATURATION: 95 % | HEART RATE: 87 BPM

## 2023-02-13 DIAGNOSIS — L70.9 ACNE, UNSPECIFIED ACNE TYPE: Primary | ICD-10-CM

## 2023-02-13 DIAGNOSIS — F90.2 ATTENTION DEFICIT HYPERACTIVITY DISORDER (ADHD), COMBINED TYPE: ICD-10-CM

## 2023-02-13 PROCEDURE — 99213 OFFICE O/P EST LOW 20 MIN: CPT | Performed by: PHYSICIAN ASSISTANT

## 2023-02-13 RX ORDER — DEXTROAMPHETAMINE SACCHARATE, AMPHETAMINE ASPARTATE, DEXTROAMPHETAMINE SULFATE AND AMPHETAMINE SULFATE 5; 5; 5; 5 MG/1; MG/1; MG/1; MG/1
20 TABLET ORAL DAILY
Qty: 30 TABLET | Refills: 0 | Status: CANCELLED | OUTPATIENT
Start: 2023-02-13

## 2023-02-13 ASSESSMENT — PAIN SCALES - GENERAL: PAINLEVEL: MILD PAIN (2)

## 2023-02-13 NOTE — PATIENT INSTRUCTIONS
May trial benzoyl peroxide wash start once daily and may increase 2-3 times daily as needed.     Consider salicylic acid toner product with this.

## 2023-02-13 NOTE — TELEPHONE ENCOUNTER
Date of Service: 03/11/2017    INFECTIOUS DISEASE CONSULTATION    REQUESTING PHYSICIAN:  Dr. Burnett.    REASON FOR CONSULTATION:    Escherichia coli bacteremia with sepsis, UTI, liver lesions concerning for microabscesses please evaluate and make appropriate recommendations.    HISTORY OF PRESENT ILLNESS:      This is a pleasant 48-year-old woman with past medical history of alcoholic liver disease with cirrhosis, chronic pancreatitis, Insulin-dependent diabetes with neuropathy who was recently hospitalized to [facility] on 03/01/2017 with nausea, emesis, dysuria, fever and chills and noted to be sepsis and shock.  The patient was initially placed on Ceftriaxone in the ER and switched over to Zosyn and Vancomycin, admitted to the ICU at [facility].  Blood cultures turned positive with gram-negative rods and grew out E. coli.  She had some elevated liver enzymes and hyperbilirubinemia, and ID was seen her, she was switched over to Zosyn alone.  A CT of the abdomen and pelvis initially on the 1st showed micronodular hypoattenuation throughout the liver, microabscesses were thought to be in the differential.  A follow up CAT scan on the 5th with contrast shows a small amount of ascites and persistence of countless subcentimeter low attenuation nodules within the liver compatible with microabscesses versus hepatic metastasis.  Repeat CAT scan with contrast done on the 03/09, that was yesterday, showed diffuse micronodular cystic changes present throughout the segments of the liver and also bibasilar pleural effusions, dependent atelectasis, perihepatic and perisplenic ascites as well as ascites tracking in the dependent regions of the pelvis.    The patient was transferred over to [facility] for further management, hepatology evaluation and biopsies.    The patient reports diffuse abdominal pain and tenderness, more or less constant, especially worse with touching and tenderness  See CHEQROOM message, routing to PCP for review.    Martha Hodge RN  [facility]   relieved with pain medications.  Fever seems to have pretty much resolved.  Denies any nausea, vomiting, hematemesis, melena, etc.  She denies any risk for HIV, although, she drinks alcohol and smokes.    She denies any other metals, hardware, valve surgeries, etc.  She had a complicated open cholecystectomy which she reports that she had a liver injury and bleeding about 3 years ago.        Past Medical History:   Diagnosis Date   • Alcoholic cirrhosis of liver    • Diabetes mellitus    • Essential (primary) hypertension    • GERD (gastroesophageal reflux disease)    • High cholesterol    • Pancreatitis    • Thyroid condition          Past Surgical History:   Procedure Laterality Date   • CHOLECYSTECTOMY     • ESOPHAGOGASTRODUODENOSCOPY  12/07/2016   • HERNIA REPAIR     • REMOVAL GALLBLADDER           Social History     Social History   • Marital status: Single     Spouse name: N/A   • Number of children: N/A   • Years of education: N/A     Occupational History   • Not on file.     Social History Main Topics   • Smoking status: Former Smoker     Packs/day: 0.50     Years: 28.00     Types: Cigarettes     Quit date: 2/18/2017   • Smokeless tobacco: Never Used   • Alcohol use 100.8 oz/week     168 Shots of liquor, 0 Standard drinks or equivalent per week      Comment: 1-2 pints of vodka daily until approximately one week ago.   • Drug use: Yes     Special: Marijuana      Comment: rarely uses for pain   • Sexual activity: Yes     Partners: Male     Other Topics Concern   • Not on file     Social History Narrative         Family History   Problem Relation Age of Onset   • Diabetes Mother    • Heart disease Mother    • Cancer Father          Immunizations:    Most Recent Immunizations   Administered Date(s) Administered   • Tdap 07/12/2010         ALLERGIES:   Allergen Reactions   • Bee Sting ANAPHYLAXIS         Current Facility-Administered Medications   Medication   • DULoxetine (CYMBALTA) capsule 30 mg   • folic acid  (FOLATE) tablet 1 mg   • gabapentin (NEURONTIN) capsule 600 mg   • [START ON 3/12/2017] insulin glargine (LANTUS) injection 20 Units   • levothyroxine (SYNTHROID, LEVOTHROID) tablet 100 mcg   • nicotine (NICODERM) 14 MG/24HR patch 1 patch   • pantoprazole (PROTONIX) EC tablet 40 mg   • traZODONE (DESYREL) tablet 100 mg   • sodium chloride (PF) 0.9 % injection 2 mL   • sodium chloride (PF) 0.9 % injection 2 mL   • spironolactone (ALDACTONE) tablet 25 mg   • rifAXIMIN (XIFAXAN) tablet 200 mg   • pentoxifylline (TRENtal) CR tablet 400 mg   • thiamine (VITAMIN B1) tablet 100 mg   • vitamin - therapeutic multivitamins w/minerals (CENTRUM SILVER,THERA-M) 1 tablet   • dextrose 50 % injection 25 g   • dextrose 5 % infusion   • glucagon (GLUCAGEN) injection 1 mg   • dextrose (GLUTOSE) 40 % gel 15 g   • potassium chloride (K-DUR,KLOR-CON) CR tablet 20 mEq   • potassium chloride (KLOR-CON) packet 20 mEq   • potassium chloride 20 mEq/100mL IVPB premix   • potassium chloride (K-DUR,KLOR-CON) CR tablet 40 mEq   • potassium chloride (KLOR-CON) packet 40 mEq   • potassium chloride 20 mEq/100mL IVPB premix   • ondansetron (ZOFRAN) injection 4 mg   • insulin lispro (HumaLOG) correction dose   • magnesium sulfate 1 g in dextrose 5% 100 mL IVPB premix   • magnesium sulfate 2 g in sodium chloride 0.9% IVPB   • magnesium sulfate 2 g in sodium chloride 0.9% IVPB   • LORazepam (ATIVAN) injection 2 mg    Or   • LORazepam (ATIVAN) tablet 2 mg   • furosemide (LASIX) tablet 40 mg   • [START ON 3/13/2017] nicotine patch removal   • traMADOL (ULTRAM) tablet 50 mg   • ceFEPIme (MAXIPIME) 1,000 mg in sodium chloride 0.9 % 100 mL IVPB   • hydrALAZINE (APRESOLINE) injection 10 mg   • acetaminophen (TYLENOL) tablet 650 mg   • phytonadione (vitamin K) (AQUA-MEPHYTON) injection 10 mg   • lactulose (CHRONULAC) 10 GM/15ML syrup 10 g           REVIEW OF SYSTEMS:     GENERAL: Denies any fever, chills, sweats, weakness+  HEENT: Denies problems with vision,  swallowing or hearing.   RESPIRATORY: No cough, shortness of breath, phlegm.  CARDIAC: Denies chest pain, no palpitations, orthopnea.  GASTROINTESTINAL: + abdominal pain; nausea, vomiting, diarrhea.   GENITOURINARY: No dysuria, frequency, urgency, hematuria.  MUSCULOSKELETAL: no pain, swelling, redness of extremities.  SKIN: No rash, lesions.   NEUROLOGIC: No headaches, seizures, syncope.   ALLERGIES: None.   PSYCHIATRIC: No anxiety, depression.   HEMATOLOGIC: No bleeding or bruising.       PHYSICAL EXAMINATION:     GENERAL: no apparent distress    Vital Signs:    Visit Vitals   • /60 (BP Location: [facility], Patient Position: Semi-Beauchamp's)   • Pulse 91   • Temp 98.3 °F (36.8 °C) (Oral)   • Resp 18   • Ht 5' 3\" (1.6 m)   • Wt 75.5 kg   • SpO2 92%   • BMI 29.48 kg/m2       HEENT: Head is atraumatic, normocephalic. Pupils are equal in size, reacting to light. Throat is clear. She has jaundice.    NECK: No JVD, no lymphadenopathy.   HEART: S1, S2, no murmur   LUNGS: Clear to auscultation.   ABDOMEN: Bowel sounds are heard. Diffuse tenderness, ascites, no hepatosplenomegaly  GENITOURINARY: There is no suprapubic or CVA tenderness.   MUSCULOSKELETAL: Bilateral lower extremity without edema, no tenderness, open wounds, drainage.   SKIN: no rash, lesions  CENTRAL NERVOUS SYSTEM: nonfocal.   IV:  looks okay.        Labs      Recent Labs  Lab 03/11/17  0420 03/10/17  0340 03/09/17  0358 03/08/17  0408 03/07/17  0354  03/06/17  0905  03/05/17  0408   WBC 13.4* 13.6* 13.7* 12.8* 9.8  --  7.6  --  6.1   HGB 10.7* 11.1* 11.3* 11.6* 11.8*  --  12.4  --  12.1   HCT 31.3* 32.2* 32.6* 33.7* 34.6*  --  35.8*  --  36.1   * 100* 90* 79* 75*  --  67*  --  54*   SEG 77 78 75  --   --   --  57  --  57   LYMP  --   --   --  20 20  --   --   --   --    SODIUM 138 140 140 138 138  --  139  --  137   POTASSIUM 3.9 3.9 3.8 3.2* 3.3*  < > 3.4  < > 3.0*   CHLORIDE 103 105 104 102 105  --  108*  --  109*   BUN 2* 3* 3* 3* 3*  --  3*  --   4*   CREATININE 0.52 0.46* 0.52 0.56 0.59  --  0.62  --  0.59   GLUCOSE 134* 141* 189* 221* 287*  --  253*  --  228*   CRP  --  5.1* 4.6* 4.7* 5.0*  --   --   --   --    * 119* 129* 136* 112*  --   --   --   --    GPT 68 69 75 83* 75  --   --   --   --    < > = values in this interval not displayed. No results found        URINALYSIS:     Recent Labs  Lab 03/11/17  1045   USPG <1.005   UPH 7.5*   UKET NEGATIVE   UPROT NEGATIVE   UGLU NEGATIVE   UBILI POSITIVE*   UROB 0.2   UWBC NEGATIVE   UBACTR NONE SEEN   UNITR NEGATIVE   LEUK 1 to 5   URBC NEGATIVE          Laboratory:  Culture results:  Specimen Description (no units)   Date Value   03/08/2017 BLOOD   03/08/2017 BLOOD, PICC   03/08/2017 BLOOD, ANTECUBITAL RIGHT   03/04/2017 BLOOD HAND, RIGHT    CULTURE (no units)   Date Value   03/08/2017     NO GROWTH 2 DAYS. This method is FDA approved for use with blood. Use with other fluid types such as bone marrow have not been validated and results should be interpreted with caution and in the context of the patient's clinical condition.   03/08/2017 NO GROWTH 3 DAYS.   03/08/2017 NO GROWTH 3 DAYS.   03/04/2017 NO GROWTH 5 DAYS.                  IMAGING:  CT of the abdomen and pelvis were reviewed from 03/01, 03/05 and 03/09.  Most recent CT shows micronodular cystic changes throughout all segments of the liver, stable bibasilar pleural effusions.    MICROBIOLOGY:  Blood cultures on 03/01/2017 were positive.  Repeat blood cultures on 03/02, 03/04 and 03/08/2017 had no growth.  Urine cultures are positive for E. coli.        ASSESSMENT AND PLAN:    The patient is a 48-year-old woman with history of alcoholism with cirrhosis of liver, Insulin-dependent diabetes with neuropathy, history of pancreatitis, recent hospitalization to [facility] earlier this month with sepsis, E. coli bacteremia from urinary tract infection source.  The patient has currently been on Ertapenem.  1.  Alcoholic liver disease  with cirrhosis.  2.  Abdominal pain, ascites.  3.  Micronodular cystic changes in the liver concerning for abscess versus malignancy versus other etiologies.  4.  Insulin-dependent diabetes with neuropathy.  5.  History of alcoholism with cirrhosis of liver.  6.  Elevated liver enzymes.  7.  Thrombocytopenia.      PLAN:    Order US liver. Consider paracentesis.  We will switch her back to Cefepime.  The thrush is cleared.  I will discontinue Micafungin.      The patient consented for HIV screen.      We will  order alphafetoprotein, CEA and consider transesophageal echocardiogram, although E. coli bacteremia will be unlikely, however, given new onset of abdominal pain and hepatic lesions concerning abscesses that might be a concern.      Consider liver biopsy and sending the specimen for pathology and cultures.  CT chest   May need a THIERRY.    Discussed with the patient, the RN and the attending, Dr. Burnett.    Thank you for involving me in the care of this pleasant woman.  I will be following along with you.      Dictated By: Diego Wilkins MD  Signing Provider: Diego Wilkins MD    [facility]/so ([id number])  DD: 03/11/2017 08:03:18 TD: 03/11/2017 08:41:36    Copy Sent To:

## 2023-02-13 NOTE — PROGRESS NOTES
Assessment & Plan     (L70.9) Acne, unspecified acne type  (primary encounter diagnosis)  Comment: Patient presents for evaluation of acne.  This has been an ongoing issue.  He has not tried any significant over-the-counter remedies.  Discussed with the patient trial of benzoyl peroxide wash starting at 1 time daily and then may increase to 3-3 times daily as needed.  Also considered potential for salicylic acid based toner/cleanser.  Discussed at length with the patient that this can take time to resolve.  Did place referral for dermatology.  Plan: Adult Dermatology Referral          (F90.2) Attention deficit hyperactivity disorder (ADHD), combined type  Comment: Patient with history of ADHD.  He is on Adderall.  In chart review with the patient it appears that 3 days prior refills of medications were sent to his pharmacy.  His pharmacy opens at 9 AM today.  He will call his pharmacy to ensure they have refills otherwise if there are issues he will reach back out to the clinicl       Return if symptoms worsen or fail to improve.    Alverto Bennett PA-C  [facility]   Daniel is a 27 year old, presenting for the following health issues:  Medication Request (Refill on Adderall. )  Back    History of Present Illness       Reason for visit:  Medication    He eats 0-1 servings of fruits and vegetables daily.He consumes 2 sweetened beverage(s) daily.He exercises with enough effort to increase his heart rate 30 to 60 minutes per day.  He exercises with enough effort to increase his heart rate 3 or less days per week. He is missing 1 dose(s) of medications per week.     Patient presented concerned that he needs a refill of his Adderall medications.  He does not believe he has any refills left at the pharmacy.  Has been tolerating this medication well without issues.    Further, the patient has complaints of facial acne.  Acne has been over the cheeks as well as along the neck and  jawline.  There is been no redness associated with this.  No known triggers.  Patient was hoping to follow-up with dermatology for this, however, he has not tried any significant facial washes or creams previously.      Review of Systems   Constitutional, HEENT, cardiovascular, pulmonary, gi and gu systems are negative, except as otherwise noted.      Objective    /78   Pulse 87   Temp 97.8  F (36.6  C) (Tympanic)   Resp 16   Ht 1.829 m (6')   Wt 116.4 kg (256 lb 9.6 oz)   SpO2 95%   BMI 34.80 kg/m    Body mass index is 34.8 kg/m .  Physical Exam   GENERAL: healthy, alert and no distress  HENT: Normal cephalic   RESP: lungs clear to auscultation - no rales, rhonchi or wheezes  CV: regular rate and rhythm, normal S1 S2, no S3 or S4, no murmur, click or rub,   MS: no gross musculoskeletal defects noted,    SKIN: acne over the cheeks as well as the neck and jawline.  No surrounding erythema.  No obvious abscess or drainage.

## 2023-04-12 ENCOUNTER — OFFICE VISIT (OUTPATIENT)
Dept: FAMILY MEDICINE | Facility: CLINIC | Age: 28
End: 2023-04-12
Payer: COMMERCIAL

## 2023-04-12 VITALS
HEIGHT: 72 IN | DIASTOLIC BLOOD PRESSURE: 68 MMHG | OXYGEN SATURATION: 96 % | TEMPERATURE: 97.4 F | WEIGHT: 259.4 LBS | HEART RATE: 88 BPM | SYSTOLIC BLOOD PRESSURE: 124 MMHG | RESPIRATION RATE: 16 BRPM | BODY MASS INDEX: 35.13 KG/M2

## 2023-04-12 DIAGNOSIS — N50.82 SCROTAL PAIN: Primary | ICD-10-CM

## 2023-04-12 LAB
ALBUMIN UR-MCNC: NEGATIVE MG/DL
APPEARANCE UR: CLEAR
BILIRUB UR QL STRIP: NEGATIVE
COLOR UR AUTO: YELLOW
GLUCOSE UR STRIP-MCNC: NEGATIVE MG/DL
HGB UR QL STRIP: NEGATIVE
KETONES UR STRIP-MCNC: NEGATIVE MG/DL
LEUKOCYTE ESTERASE UR QL STRIP: NEGATIVE
NITRATE UR QL: NEGATIVE
PH UR STRIP: 6 [PH] (ref 5–7)
SP GR UR STRIP: 1.02 (ref 1–1.03)
UROBILINOGEN UR STRIP-ACNC: 0.2 E.U./DL

## 2023-04-12 PROCEDURE — 81003 URINALYSIS AUTO W/O SCOPE: CPT | Performed by: PHYSICIAN ASSISTANT

## 2023-04-12 PROCEDURE — 99213 OFFICE O/P EST LOW 20 MIN: CPT | Performed by: PHYSICIAN ASSISTANT

## 2023-04-12 ASSESSMENT — PATIENT HEALTH QUESTIONNAIRE - PHQ9
SUM OF ALL RESPONSES TO PHQ QUESTIONS 1-9: 4
10. IF YOU CHECKED OFF ANY PROBLEMS, HOW DIFFICULT HAVE THESE PROBLEMS MADE IT FOR YOU TO DO YOUR WORK, TAKE CARE OF THINGS AT HOME, OR GET ALONG WITH OTHER PEOPLE: SOMEWHAT DIFFICULT
SUM OF ALL RESPONSES TO PHQ QUESTIONS 1-9: 4

## 2023-04-12 ASSESSMENT — PAIN SCALES - GENERAL: PAINLEVEL: MODERATE PAIN (4)

## 2023-04-12 NOTE — PATIENT INSTRUCTIONS
Emir Sanchez,    Thank you for allowing [facility] to manage your care.    I am unsure of the cause of your symptoms, but your exam is reassuring. We will see what our workup shows.     If you develop worsening/changing symptoms at any time, please call 911 or go to the emergency department for evaluation.    I ordered some lab work, please go to the laboratory to get your studies.    I ordered an Ultrasound. Please call diagnostic imaging [phone] to schedule your test.    Please allow 1-2 business days for our office to contact you in regards to your laboratory/radiological studies.  If not done so, I encourage you to login into [employer] ([email or web address] to review your results as well.     For your pain, please use Tylenol 650mg every 6 hours. You may use 400mg of ibuprofen between doses of Tylenol.     Max acetaminophen (Tylenol) 4,000mg/24 hours  Max ibuprofen 3,000mg/24 hours    If you have any questions or concerns, please feel free to call us at [phone]    Sincerely,    Selvin Molina PA-C    Did you know?      You can schedule a video visit for follow-up appointments as well as future appointments for certain conditions.  Please see the below link.     [email or web address]    If you have not already done so,  I encourage you to sign up for [employer] ([email or web address]).  This will allow you to review your results, securely communicate with a provider, and schedule virtual visits as well.

## 2023-04-12 NOTE — PROGRESS NOTES
"  Assessment & Plan   Problem List Items Addressed This Visit    None  Visit Diagnoses     Scrotal pain    -  Primary    Relevant Orders    UA Macro with Reflex to Micro and Culture - lab collect (Completed)    US Testicular & Scrotum w Doppler Ltd (Completed)         Minor trauma to scrotum. Low suspicion for torsion clinically and US shows good blood flow. No hematoma or other abnormality seen. No hematuria, so unlikely urethral injury. Contusion possible. Encouraged elevation and ice, otc analgesics and follow up prn.    Complete history and physical exam as below. Afebrile with normal vital signs.    DDx and Dx discussed with and explained to the pt to their satisfaction.  All questions were answered at this time. Pt expressed understanding of and agreement with this dx, tx, and plan. No further workup warranted and standard medication warnings given. I have given the patient a list of pertinent indications for re-evaluation. Will go to the Emergency Department if symptoms worsen or new concerning symptoms arise. Patient left in no apparent distress.     Ordering of each unique test     BMI:   Estimated body mass index is 35.52 kg/m  as calculated from the following:    Height as of this encounter: 1.82 m (5' 11.65\").    Weight as of this encounter: 117.7 kg (259 lb 6.4 oz).     See Patient Instructions    CARLOS Tobin  [facility] NAVI Sanchez is a 27 year old, presenting for the following health issues:  Groin Pain        4/12/2023     1:29 PM   Additional Questions   Roomed by stanley machuca   Accompanied by no one         4/12/2023     1:29 PM   Patient Reported Additional Medications   Patient reports taking the following new medications no     History of Present Illness       Reason for visit:  Testicular Pain  Symptom onset:  Today  Symptoms include:  Soreness  Symptom intensity:  Mild  Symptom progression:  Staying the same  Had these symptoms before:  No  What makes it " "worse:  NA  What makes it better:  NA    He eats 0-1 servings of fruits and vegetables daily.He consumes 2 sweetened beverage(s) daily.He exercises with enough effort to increase his heart rate 20 to 29 minutes per day.  He exercises with enough effort to increase his heart rate 4 days per week. He is missing 2 dose(s) of medications per week.    Today's PHQ-9         PHQ-9 Total Score: 4    PHQ-9 Q9 Thoughts of better off dead/self-harm past 2 weeks :   Not at all    How difficult have these problems made it for you to do your work, take care of things at home, or get along with other people: Somewhat difficult     Left side of scrotum hurts most. Feels elevated on that side. Radiates into stomach. 4/10 currently.     Review of Systems   Constitutional, HEENT, cardiovascular, pulmonary, gi and gu systems are negative, except as otherwise noted.      Objective    /68   Pulse 88   Temp 97.4  F (36.3  C) (Tympanic)   Resp 16   Ht 1.82 m (5' 11.65\")   Wt 117.7 kg (259 lb 6.4 oz)   SpO2 96%   BMI 35.52 kg/m    Body mass index is 35.52 kg/m .  Physical Exam  Vitals and nursing note reviewed.   Constitutional:       General: He is not in acute distress.     Appearance: He is not ill-appearing or diaphoretic.   HENT:      Head: Normocephalic and atraumatic.      Mouth/Throat:      Mouth: Mucous membranes are moist.   Eyes:      Conjunctiva/sclera: Conjunctivae normal.   Cardiovascular:      Rate and Rhythm: Normal rate and regular rhythm.      Heart sounds: Normal heart sounds. No murmur heard.     No friction rub. No gallop.   Pulmonary:      Effort: Pulmonary effort is normal. No respiratory distress.      Breath sounds: Normal breath sounds. No stridor. No wheezing, rhonchi or rales.   Abdominal:      General: Bowel sounds are normal. There is no distension.      Palpations: Abdomen is soft. There is no mass.      Tenderness: There is no abdominal tenderness. There is no guarding or rebound.      Hernia: No " hernia is present.   Genitourinary:     Comments: Circumcised. No blood or discharge at the meatus. No inguinal hernia or adenopathy. No rashes or sores. Scrotal contents normal, without masses and non-tender aside from L spermatic cord which is mildly tender.  No overlying signs of trauma or infection.  Skin:     General: Skin is warm and dry.   Neurological:      General: No focal deficit present.      Mental Status: He is alert. Mental status is at baseline.   Psychiatric:         Mood and Affect: Mood normal.         Behavior: Behavior normal.            Results for orders placed or performed in visit on 04/12/23   UA Macro with Reflex to Micro and Culture - lab collect     Status: Normal    Specimen: Urine, Clean Catch   Result Value Ref Range    Color Urine Yellow Colorless, Straw, Light Yellow, Yellow    Appearance Urine Clear Clear    Glucose Urine Negative Negative mg/dL    Bilirubin Urine Negative Negative    Ketones Urine Negative Negative mg/dL    Specific Gravity Urine 1.025 1.003 - 1.035    Blood Urine Negative Negative    pH Urine 6.0 5.0 - 7.0    Protein Albumin Urine Negative Negative mg/dL    Urobilinogen Urine 0.2 0.2, 1.0 E.U./dL    Nitrite Urine Negative Negative    Leukocyte Esterase Urine Negative Negative    Narrative    Microscopic not indicated

## 2023-04-13 ENCOUNTER — ANCILLARY PROCEDURE (OUTPATIENT)
Dept: ULTRASOUND IMAGING | Facility: CLINIC | Age: 28
End: 2023-04-13
Attending: PHYSICIAN ASSISTANT
Payer: COMMERCIAL

## 2023-04-13 DIAGNOSIS — N50.82 SCROTAL PAIN: ICD-10-CM

## 2023-04-13 PROCEDURE — 93976 VASCULAR STUDY: CPT | Mod: TC | Performed by: RADIOLOGY

## 2023-04-13 PROCEDURE — 76870 US EXAM SCROTUM: CPT | Mod: TC | Performed by: RADIOLOGY

## 2023-04-19 ENCOUNTER — OFFICE VISIT (OUTPATIENT)
Dept: FAMILY MEDICINE | Facility: CLINIC | Age: 28
End: 2023-04-19
Payer: COMMERCIAL

## 2023-04-19 VITALS
WEIGHT: 257 LBS | RESPIRATION RATE: 16 BRPM | BODY MASS INDEX: 35.98 KG/M2 | TEMPERATURE: 97.2 F | HEART RATE: 88 BPM | DIASTOLIC BLOOD PRESSURE: 70 MMHG | OXYGEN SATURATION: 98 % | SYSTOLIC BLOOD PRESSURE: 106 MMHG | HEIGHT: 71 IN

## 2023-04-19 DIAGNOSIS — F90.2 ATTENTION DEFICIT HYPERACTIVITY DISORDER (ADHD), COMBINED TYPE: Primary | ICD-10-CM

## 2023-04-19 DIAGNOSIS — F33.1 MODERATE EPISODE OF RECURRENT MAJOR DEPRESSIVE DISORDER (H): ICD-10-CM

## 2023-04-19 DIAGNOSIS — Z11.59 NEED FOR HEPATITIS C SCREENING TEST: ICD-10-CM

## 2023-04-19 DIAGNOSIS — F41.9 ANXIETY: ICD-10-CM

## 2023-04-19 LAB — CREAT UR-MCNC: 241 MG/DL

## 2023-04-19 PROCEDURE — 99214 OFFICE O/P EST MOD 30 MIN: CPT | Performed by: FAMILY MEDICINE

## 2023-04-19 RX ORDER — DEXTROAMPHETAMINE SACCHARATE, AMPHETAMINE ASPARTATE, DEXTROAMPHETAMINE SULFATE AND AMPHETAMINE SULFATE 5; 5; 5; 5 MG/1; MG/1; MG/1; MG/1
20 TABLET ORAL DAILY
Qty: 30 TABLET | Refills: 0 | Status: SHIPPED | OUTPATIENT
Start: 2023-05-26 | End: 2023-06-25

## 2023-04-19 RX ORDER — DEXTROAMPHETAMINE SACCHARATE, AMPHETAMINE ASPARTATE, DEXTROAMPHETAMINE SULFATE AND AMPHETAMINE SULFATE 5; 5; 5; 5 MG/1; MG/1; MG/1; MG/1
20 TABLET ORAL DAILY
Qty: 30 TABLET | Refills: 0 | Status: SHIPPED | OUTPATIENT
Start: 2023-04-26 | End: 2023-05-26

## 2023-04-19 RX ORDER — DEXTROAMPHETAMINE SACCHARATE, AMPHETAMINE ASPARTATE, DEXTROAMPHETAMINE SULFATE AND AMPHETAMINE SULFATE 5; 5; 5; 5 MG/1; MG/1; MG/1; MG/1
20 TABLET ORAL DAILY
Qty: 30 TABLET | Refills: 0 | Status: SHIPPED | OUTPATIENT
Start: 2023-06-25 | End: 2023-10-20

## 2023-04-19 RX ORDER — DEXTROAMPHETAMINE SACCHARATE, AMPHETAMINE ASPARTATE, DEXTROAMPHETAMINE SULFATE AND AMPHETAMINE SULFATE 5; 5; 5; 5 MG/1; MG/1; MG/1; MG/1
20 TABLET ORAL DAILY
Qty: 30 TABLET | Refills: 0 | Status: CANCELLED | OUTPATIENT
Start: 2023-04-19

## 2023-04-19 RX ORDER — DULOXETIN HYDROCHLORIDE 30 MG/1
90 CAPSULE, DELAYED RELEASE ORAL DAILY
Qty: 90 CAPSULE | Refills: 5 | Status: SHIPPED | OUTPATIENT
Start: 2023-04-19 | End: 2023-10-25

## 2023-04-19 ASSESSMENT — PATIENT HEALTH QUESTIONNAIRE - PHQ9
10. IF YOU CHECKED OFF ANY PROBLEMS, HOW DIFFICULT HAVE THESE PROBLEMS MADE IT FOR YOU TO DO YOUR WORK, TAKE CARE OF THINGS AT HOME, OR GET ALONG WITH OTHER PEOPLE: SOMEWHAT DIFFICULT
SUM OF ALL RESPONSES TO PHQ QUESTIONS 1-9: 5
SUM OF ALL RESPONSES TO PHQ QUESTIONS 1-9: 5

## 2023-04-19 ASSESSMENT — PAIN SCALES - GENERAL: PAINLEVEL: NO PAIN (0)

## 2023-04-19 NOTE — PATIENT INSTRUCTIONS
Urine    I sent medication refills for three months.  In three months you can just request 3 months of adderall refills, MyChart Med refill request, just specify that it is 3 months.  Video in 6 months  My chart refill request and then again in clinic.

## 2023-04-19 NOTE — LETTER
April 21, 2023      Daniel Thomas  [address] [facility] [address]        Dear ,    We are writing to inform you of your test results.    Urine medication showed your Adderall you are prescribed and taking.     Resulted Orders   Urine Drug Confirmation Panel   Result Value Ref Range    Amphetamine ng/mL 3,320 (H) <50 ng/mL    Amphetamine 1,378 Absent ng/mg [creat]      Comment:      Sources of amphetamine include illicit sources, as a scheduled prescription medication, as a metabolite of some prescription drugs, or use of an l-methamphetamine inhaler.  Amphetamine is an expected metabolite of methamphetamine. Amphetamine is also available as a scheduled prescription drug.    Narrative    This test was developed and its performance characteristics determined by the [facility],  [facility] Laboratory. It has not been cleared or approved by the FDA. The laboratory is regulated under CLIA as qualified to perform high-complexity testing. This test is used for clinical purposes. It should not be regarded as investigational or for research.    Drugs with concentrations less than the cutoff will not be reported.  The drugs with applicable detection cutoff limits that are included within the Drug Confirmation Panel are:    The following drugs are detected with a cutoff of 3 ng/ml: FENTANYL    The following drugs are detected with a cutoff of 5 ng/mL: 6-ACETYLMORPHINE, BUPRENORPHINE, NALOXONE, NORBUPRENORPHINE, NORFENTANYL    The following drugs are detected with a cutoff of 10 ng/mL: SUFENTANIL    The following drugs are detected with a cutoff of 20 ng/mL: PCP (PHENCYCLIDINE)    The following drugs are detected with a cutoff of 50 ng/mL: 7-AMINOCLONAZEPAM, 7-AMINOFLUNITRAZEPAM, ALPRAZOLAM, AMPHETAMINE, A-OH-ALPRAZOLAM, A-OH-MIDAZOLAM, A-OH-TRIAZOLAM, BENZOYLECGONINE (Cocaine Metabolite), CLONAZEPAM, COCAETHYLENE (Cocaine Metabolite), CODEINE, DIAZEPAM,  DIHYDROCODEINE, EDDP (Methadone Metabolite), HYDROCODONE, HYDROMORPHONE, LORAZEPAM, MDA (3,4-Methylenedioxyamphetamine), MDEA (3,7-Ekifdtcvfvprmz-X-ethylcathinone), MDMA (Methylenedioxyamphetamine,Ecstasy), MEPERIDINE, METHADONE, METHAMPHETAMINE, METHYLPHENIDATE (Ritalin), MORPHINE, NALTREXONE, N-DESMETH-TAPENTADOL, NORCODEINE, NORDIAZEPAM, NORMEPERIDINE, O-LE-TRAMADOL, OXAZEPAM, OXYCODONE, OXYMORPHONE, PROPOXYPHENE, RITALINIC ACID, TAPENTADOL, TEMAZEPAM, THEBAINE, TRAMADOL    The following drugs are detected with a cutoff of 100 ng/mL: GABAPENTIN, KETAMINE    The following drugs are detected with a cutoff of 200 ng/mL: PREGABALIN     Urine Creatinine for Drug Screen Panel   Result Value Ref Range    Creatinine Urine for Drug Screen 241 mg/dL      Comment:      The reference range has not been established for creatinine in random urines. The results should be integrated into the clinical context for interpretation.       If you have any questions or concerns, please call the clinic at the number listed above.       Sincerely,      Nadeem Hernández MD

## 2023-04-19 NOTE — PROGRESS NOTES
"  Assessment & Plan     Attention deficit hyperactivity disorder (ADHD), combined type  Stable, refill  - amphetamine-dextroamphetamine (ADDERALL) 20 MG tablet; Take 1 tablet (20 mg) by mouth daily for 30 days  - amphetamine-dextroamphetamine (ADDERALL) 20 MG tablet; Take 1 tablet (20 mg) by mouth daily for 30 days  - amphetamine-dextroamphetamine (ADDERALL) 20 MG tablet; Take 1 tablet (20 mg) by mouth daily for 30 days  - Drug Confirmation Panel Urine with Creat - lab collect  - PRIMARY CARE FOLLOW-UP SCHEDULING; Future    Need for hepatitis C screening test      Anxiety: stable, refill  Stable, refill  - DULoxetine (CYMBALTA) 30 MG capsule; Take 3 capsules (90 mg) by mouth daily    Moderate episode of recurrent major depressive disorder (H): stable, refill  Stable, refill  - DULoxetine (CYMBALTA) 30 MG capsule; Take 3 capsules (90 mg) by mouth daily  - PRIMARY CARE FOLLOW-UP SCHEDULING; Future       BMI:   Estimated body mass index is 35.59 kg/m  as calculated from the following:    Height as of this encounter: 1.81 m (5' 11.25\").    Weight as of this encounter: 116.6 kg (257 lb).   Weight management plan: Discussed healthy diet and exercise guidelines    See Patient Instructions    Nadeem Hernández MD  [facility]    Kennedi Sanchez is a 27 year old, presenting for the following health issues:  A.D.H.D        4/19/2023     7:44 AM   Additional Questions   Roomed by Paula Garvin MA   Accompanied by Self         4/19/2023     7:44 AM   Patient Reported Additional Medications   Patient reports taking the following new medications none     HPI     Recheck ADHD/ADD. Adderall 20 MG    Updates since last visit: going well  Routine for taking medicine, including time: Patient states he is working nights right now so he takes his Adderall at bout 6:30pm. He will be switching back to days sometimes next month.  Time medicine wears off: 5-6 am  Issues at school/Work: None  Issues at home: " "None  Control of symptoms: Well controlled    Side effects:  Headaches: No  Stomach aches: No  Irritability/mood swings: No  Difficulties with sleep: Yes- sometimes, not sure if it is from the Medication or just his work schedule.  Social withdrawal: No  Unusual movements/tics: No  Decreased appetite: No    Other concerns: nothing    OCD/depression/anxiety: a little worse OCD symptoms with switching sleep and difficulty getting enough sleep and timing of when to take medication  Other wise mood is stable.      Review of Systems   Constitutional, HEENT, cardiovascular, pulmonary, gi and gu systems are negative, except as otherwise noted.      Objective    /70 (BP Location: Right arm, Patient Position: Sitting, Cuff Size: Adult Large)   Pulse 88   Temp 97.2  F (36.2  C) (Tympanic)   Resp 16   Ht 1.81 m (5' 11.25\")   Wt 116.6 kg (257 lb)   SpO2 98%   BMI 35.59 kg/m    Body mass index is 35.59 kg/m .  Physical Exam   GENERAL: healthy, alert and no distress  NECK: no adenopathy, no asymmetry, masses, or scars and thyroid normal to palpation  RESP: lungs clear to auscultation - no rales, rhonchi or wheezes  CV: regular rate and rhythm, normal S1 S2, no S3 or S4, no murmur, click or rub, no peripheral edema and peripheral pulses strong  MS: no gross musculoskeletal defects noted, no edema  PSYCH: mentation appears normal, affect normal/bright                    "

## 2023-04-19 NOTE — LETTER
[facility]  04/19/23  Patient: Daniel Thomas  YOB: 1995  Medical Record Number: [id number]                                                                                  Non-Opioid Controlled Substance Agreement    This is an agreement between you and your provider regarding safe and appropriate use of controlled substances prescribed by your care team. Controlled substances are?medicines that can cause physical and mental dependence (abuse).     There are strict laws about having and using these medicines. We here at [facility] are  committed to working with you in your efforts to get better. To support you in this work, we'll help you schedule regular office appointments for medicine refills. If we must cancel or change your appointment for any reason, we'll make sure you have enough medicine to last until your next appointment.     As a Provider, I will:     Listen carefully to your concerns while treating you with respect.     Recommend a treatment plan that I believe is in your best interest and may involve therapies other than medicine.      Talk with you often about the possible benefits and the risk of harm of any medicine that we prescribe for you.    Assess the safety of this medicine and check how well it works.      Provide a plan on how to taper (discontinue or go off) using this medicine if the decision is made to stop its use.      ::  As a Patient, I understand controlled substances:       Are prescribed by my care provider to help me function or work and manage my condition(s).?    Are strong medicines and can cause serious side effects.       Need to be taken exactly as prescribed.?Combining controlled substances with certain medicines or chemicals (such as illegal drugs, alcohol, sedatives, sleeping pills, and benzodiazepines) can be dangerous or even fatal.? If I stop taking my medicines suddenly, I may have severe withdrawal symptoms.      The risks, benefits, and side effects of these medicine(s) were explained to me. I agree that:    1. I will take part in other treatments as advised by my care team. This may be psychiatry or counseling, physical therapy, behavioral therapy, group treatment or a referral to specialist.    2. I will keep all my appointments and understand this is part of the monitoring of controlled substances.?My care team may require an office visit for EVERY controlled substance refill. If I miss appointments or don t follow instructions, my care team may stop my medicine    3. I will take my medicines as prescribed. I will not change the dose or schedule unless my care team tells me to. There will be no refills if I run out early.      4. I may be asked to come to the clinic and complete a urine drug test or complete a pill count. If I don t give a urine sample or participate in a pill count, the care team may stop my medicine.    5. I will only receive controlled substance prescriptions from this clinic. If I am treated by another provider, I will tell them that I am taking controlled substances and that I have a treatment agreement with this provider. I will inform my [facility] care team within one business day if I am given a prescription for any controlled substance by another healthcare provider. My [facility] care team can contact other providers and pharmacists about my use of any medicines.    6. It is up to me to make sure that I don't run out of my medicines on weekends or holidays.?If my care team is willing to refill my prescription without a visit, I must request refills only during office hours. Refills may take up to 3 business days to process. I will use one pharmacy to fill all my controlled substance prescriptions. I will notify the clinic about any changes to my insurance or medicine availability.    7. I am responsible for my prescriptions. If the medicine/prescription is lost, stolen or  destroyed, it will not be replaced.?I also agree not to share controlled substance medicines with anyone.     8. I am aware I should not use any illegal or recreational drugs. I agree not to drink alcohol unless my care team says I can.     9. If I enroll in the [place] Medical Cannabis program, I will tell my care team before my next refill.    10. I will tell my care team right away if I become pregnant, have a new medical problem treated outside of my regular clinic, or have a change in my medicines.     11. I understand that this medicine can affect my thinking, judgment and reaction time.? Alcohol and drugs affect the brain and body, which can affect the safety of my driving. Being under the influence of alcohol or drugs can affect my decision-making, behaviors, personal safety and the safety of others. Driving while impaired (DWI) can occur if a person is driving, operating or in physical control of a car, motorcycle, boat, snowmobile, ATV, motorbike, off-road vehicle or any other motor vehicle ([place] Statute 169A.20). I understand the risk if I choose to drive or operate any vehicle or machinery.    I understand that if I do not follow any of the conditions above, my prescriptions or treatment may be stopped or changed.   I agree that my provider, clinic care team and pharmacy may work with any city, state or federal law enforcement agency that investigates the misuse, sale or other diversion of my controlled medicine. I will allow my provider to discuss my care with, or share a copy of, this agreement with any other treating provider, pharmacy or emergency room where I receive care.     I have read this agreement and have asked questions about anything I did not understand.    ________________________________________________________  Patient Signature - Daniel Thomas     ___________________                   Date     ________________________________________________________  Provider Signature Mitzy Silver  Nelly Betty, MD       ___________________                   Date     ________________________________________________________  Witness Signature (required if provider not present while patient signing)          ___________________                   Date

## 2023-04-21 LAB
AMPHET UR CFM-MCNC: 3320 NG/ML
AMPHET/CREAT UR: 1378 NG/MG {CREAT}

## 2023-05-05 ENCOUNTER — MYC MEDICAL ADVICE (OUTPATIENT)
Dept: FAMILY MEDICINE | Facility: CLINIC | Age: 28
End: 2023-05-05
Payer: COMMERCIAL

## 2023-06-05 ENCOUNTER — OFFICE VISIT (OUTPATIENT)
Dept: DERMATOLOGY | Facility: CLINIC | Age: 28
End: 2023-06-05
Payer: COMMERCIAL

## 2023-06-05 DIAGNOSIS — L70.0 ACNE VULGARIS: Primary | ICD-10-CM

## 2023-06-05 PROCEDURE — 99204 OFFICE O/P NEW MOD 45 MIN: CPT | Performed by: PHYSICIAN ASSISTANT

## 2023-06-05 RX ORDER — CLINDAMYCIN PHOSPHATE 10 MG/G
GEL TOPICAL
Qty: 60 G | Refills: 11 | Status: SHIPPED | OUTPATIENT
Start: 2023-06-05

## 2023-06-05 RX ORDER — TRETINOIN 0.5 MG/G
CREAM TOPICAL
Qty: 45 G | Refills: 11 | Status: SHIPPED | OUTPATIENT
Start: 2023-06-05

## 2023-06-05 RX ORDER — AMPICILLIN TRIHYDRATE 500 MG
CAPSULE ORAL
Qty: 60 CAPSULE | Refills: 1 | Status: SHIPPED | OUTPATIENT
Start: 2023-06-05 | End: 2023-10-25

## 2023-06-05 ASSESSMENT — PAIN SCALES - GENERAL: PAINLEVEL: NO PAIN (0)

## 2023-06-05 NOTE — PATIENT INSTRUCTIONS
Directions for acne:    AM    Wash your face and chest and an OTC benzoyl peroxide wash (Panoxyl, Oxy....)  To clean skin apply clindamycin gel - lower face and chest  Moisturizer over - one with SPF 30 or higher every day    PM  Wash - either with the benzoyl peroxide (if not irritated) or a gentle cleanser  To clean skin apply a pea size of tretinoin cream to your face and chest   Moisturizer over if you need it    Start ampicillin twice per day for 2 months then stop     Follow-up in 4-6 months          ACNE INFORMATION     Acne is a skin disease affecting oil glands and hair follicles in your skin.  When these pores do not drain properly, hair follicles can becomes clogged and bacteria can be trapped causing inflammation to occur. Clinical manifestations range from mild to severe, such as comedones (whiteheads and blackheads) or cysts.     Several factors contribute to acne:     1. Hormonal- Androgen (a type of hormone) can cause oil glands to enlarges and produces more sebum (oil).    2. Bacterial- A specific type of bacteria called Propionibacterium acnes are found in these oily follicles and stimulate more inflammation.     3. Genetics- History of family members (parents or siblings)     4. External factors- mechanical trauma, cosmetics, topical steroids or some oral medications (testosterone, lithium).       Acne can be effectively treated, although response may sometimes be slow. It may take 3-4 months to see 50% improvement.   Where possible, avoid excessively humid conditions such as a sauna, working in an unventilated kitchen or tropical vacations.   If you smoke, stop. Nicotine increases sebum retention and increased scale within the follicles, forming comedones (black and whiteheads).    Minimize the application of oils and cosmetics to the affected skin.   Abrasive skin treatments can aggravate acne.   Try not to scratch or pick the spots.   No relationship between particular foods and acne has been  proven (except for skim milk) However, reports suggest low glycemic and low dairy diet are helpful for some people.    Acne myths:    These factors have little effect on acne:  --Chocolate and greasy foods. Eating chocolate or greasy food has little to no effect on acne.  --Hygiene. Acne isn't caused by dirty skin. In fact, scrubbing the skin too hard or cleansing with harsh soaps or chemicals irritates the skin and can make acne worse.  --Cosmetics. Cosmetics don't necessarily worsen acne, especially if you use oil-free makeup that doesn't clog pores (noncomedogenics) and remove makeup regularly. Nonoily cosmetics don't interfere with the effectiveness of acne drugs.  --Drinking large amounts of water will NOT help acne.

## 2023-06-05 NOTE — LETTER
6/5/2023         RE: Daniel Thomas  [address] [facility] [address]        Dear Colleague,    Thank you for referring your patient, Daniel Thomas, to the [facility]. Please see a copy of my visit note below.    HPI:   CC: Daniel Thomas is a pleasant 27 year old male who presents for evaluation and treatment of acne. He has had acne on and off for awhile. He has had increasing acne on the jawline and chest. He is using a BPO wash daily.     Current Outpatient Medications   Medication Sig Dispense Refill     ampicillin (PRINCIPEN) 500 MG capsule 1 cap po bid 60 capsule 1     clindamycin (CLINDAMAX) 1 % external gel Apply to AA QD 60 g 11     tretinoin (RETIN-A) 0.05 % external cream Spread a pea size amount into affected area topically at bedtime.  Use sunscreen SPF>20. 45 g 11     amphetamine-dextroamphetamine (ADDERALL) 20 MG tablet Take 1 tablet (20 mg) by mouth daily for 30 days 30 tablet 0     [START ON 6/25/2023] amphetamine-dextroamphetamine (ADDERALL) 20 MG tablet Take 1 tablet (20 mg) by mouth daily for 30 days 30 tablet 0     DULoxetine (CYMBALTA) 30 MG capsule Take 3 capsules (90 mg) by mouth daily 90 capsule 5     Allergies   Allergen Reactions     Seasonal Allergies      Runny nose, itchy eyes     Denies any other skin complaints, in general feels well: Yes  Review of symptoms otherwise negative:Yes    PHYSICAL EXAM:   A&Ox3: Yes   Well developed/well nourished male Yes   Mood appropriate Yes      There were no vitals taken for this visit.  Type 2 skin. Mood appropriate  Alert and Oriented X 3. Well developed, well nourished in no distress.  General appearance: Normal  Head including face: Normal  Eyes: conjunctiva and lids: Normal  Mouth: Lips, teeth, gums: Normal  Skin: Scalp and body hair: See below     Comedones Papules/Pustules Cysts Staining Scarring   Face/Neck 1+ 1-2+ jawline, chin 0 1+ 0   Chest 1-2+ 1+ 0 0 0   Back 0 0 0 0 0     Telangiectasias:  No Fixed Erythema: No Exoriations: No   Other Physical Exam Findings:    ASSESSMENT & PLAN:     1. Acne Vulgaris - advised on diagnosis and treatment options. Discussed use of topical medications and antibiotics.   --Start ampicillin 500 mg BID x 2 months  --Start tretinoin 0.05% cream daily. Discussed potential for dryness/irritation. Advised to use emollients if needed.  --Continue BPO 1-2 times daily        Pt advised on use and risks including photosensitivity, allergic reactions, GI upset, headaches, nausea, erythema, scaling, vertigo, asthralgias, blood clots:Yes    Follow-up: 4-6 months  CC:   Scribed By: Pamela Mariscal, MS, PAAIYANA          Again, thank you for allowing me to participate in the care of your patient.        Sincerely,        Pamela Mariscal PA-C

## 2023-06-05 NOTE — PROGRESS NOTES
HPI:   CC: Daniel Thomas is a pleasant 27 year old male who presents for evaluation and treatment of acne. He has had acne on and off for awhile. He has had increasing acne on the jawline and chest. He is using a BPO wash daily.     Current Outpatient Medications   Medication Sig Dispense Refill     ampicillin (PRINCIPEN) 500 MG capsule 1 cap po bid 60 capsule 1     clindamycin (CLINDAMAX) 1 % external gel Apply to AA QD 60 g 11     tretinoin (RETIN-A) 0.05 % external cream Spread a pea size amount into affected area topically at bedtime.  Use sunscreen SPF>20. 45 g 11     amphetamine-dextroamphetamine (ADDERALL) 20 MG tablet Take 1 tablet (20 mg) by mouth daily for 30 days 30 tablet 0     [START ON 6/25/2023] amphetamine-dextroamphetamine (ADDERALL) 20 MG tablet Take 1 tablet (20 mg) by mouth daily for 30 days 30 tablet 0     DULoxetine (CYMBALTA) 30 MG capsule Take 3 capsules (90 mg) by mouth daily 90 capsule 5     Allergies   Allergen Reactions     Seasonal Allergies      Runny nose, itchy eyes     Denies any other skin complaints, in general feels well: Yes  Review of symptoms otherwise negative:Yes    PHYSICAL EXAM:   A&Ox3: Yes   Well developed/well nourished male Yes   Mood appropriate Yes      There were no vitals taken for this visit.  Type 2 skin. Mood appropriate  Alert and Oriented X 3. Well developed, well nourished in no distress.  General appearance: Normal  Head including face: Normal  Eyes: conjunctiva and lids: Normal  Mouth: Lips, teeth, gums: Normal  Skin: Scalp and body hair: See below     Comedones Papules/Pustules Cysts Staining Scarring   Face/Neck 1+ 1-2+ jawline, chin 0 1+ 0   Chest 1-2+ 1+ 0 0 0   Back 0 0 0 0 0     Telangiectasias: No Fixed Erythema: No Exoriations: No   Other Physical Exam Findings:    ASSESSMENT & PLAN:     1. Acne Vulgaris - advised on diagnosis and treatment options. Discussed use of topical medications and antibiotics.   --Start ampicillin 500 mg BID x 2  months  --Start tretinoin 0.05% cream daily. Discussed potential for dryness/irritation. Advised to use emollients if needed.  --Continue BPO 1-2 times daily        Pt advised on use and risks including photosensitivity, allergic reactions, GI upset, headaches, nausea, erythema, scaling, vertigo, asthralgias, blood clots:Yes    Follow-up: 4-6 months  CC:   Scribed By: Pamela Mariscal, MS, PA-C

## 2023-10-19 DIAGNOSIS — F90.2 ATTENTION DEFICIT HYPERACTIVITY DISORDER (ADHD), COMBINED TYPE: ICD-10-CM

## 2023-10-20 RX ORDER — DEXTROAMPHETAMINE SACCHARATE, AMPHETAMINE ASPARTATE, DEXTROAMPHETAMINE SULFATE AND AMPHETAMINE SULFATE 5; 5; 5; 5 MG/1; MG/1; MG/1; MG/1
TABLET ORAL
Qty: 30 TABLET | Refills: 0 | Status: SHIPPED | OUTPATIENT
Start: 2023-10-20 | End: 2024-04-26

## 2023-10-25 ENCOUNTER — VIRTUAL VISIT (OUTPATIENT)
Dept: FAMILY MEDICINE | Facility: CLINIC | Age: 28
End: 2023-10-25
Attending: FAMILY MEDICINE
Payer: COMMERCIAL

## 2023-10-25 DIAGNOSIS — F33.1 MODERATE EPISODE OF RECURRENT MAJOR DEPRESSIVE DISORDER (H): ICD-10-CM

## 2023-10-25 DIAGNOSIS — F90.2 ATTENTION DEFICIT HYPERACTIVITY DISORDER (ADHD), COMBINED TYPE: Primary | ICD-10-CM

## 2023-10-25 DIAGNOSIS — F41.9 ANXIETY: ICD-10-CM

## 2023-10-25 PROCEDURE — 99214 OFFICE O/P EST MOD 30 MIN: CPT | Mod: VID | Performed by: FAMILY MEDICINE

## 2023-10-25 RX ORDER — DEXTROAMPHETAMINE SACCHARATE, AMPHETAMINE ASPARTATE, DEXTROAMPHETAMINE SULFATE AND AMPHETAMINE SULFATE 5; 5; 5; 5 MG/1; MG/1; MG/1; MG/1
20 TABLET ORAL DAILY
Qty: 30 TABLET | Refills: 0 | Status: SHIPPED | OUTPATIENT
Start: 2023-12-19 | End: 2024-01-18

## 2023-10-25 RX ORDER — DEXTROAMPHETAMINE SACCHARATE, AMPHETAMINE ASPARTATE, DEXTROAMPHETAMINE SULFATE AND AMPHETAMINE SULFATE 5; 5; 5; 5 MG/1; MG/1; MG/1; MG/1
TABLET ORAL
Qty: 30 TABLET | Refills: 0 | Status: CANCELLED | OUTPATIENT
Start: 2023-10-25

## 2023-10-25 RX ORDER — DEXTROAMPHETAMINE SACCHARATE, AMPHETAMINE ASPARTATE, DEXTROAMPHETAMINE SULFATE AND AMPHETAMINE SULFATE 5; 5; 5; 5 MG/1; MG/1; MG/1; MG/1
20 TABLET ORAL DAILY
Qty: 30 TABLET | Refills: 0 | Status: SHIPPED | OUTPATIENT
Start: 2024-01-17 | End: 2024-02-16

## 2023-10-25 RX ORDER — DULOXETIN HYDROCHLORIDE 30 MG/1
90 CAPSULE, DELAYED RELEASE ORAL DAILY
Qty: 90 CAPSULE | Refills: 5 | Status: SHIPPED | OUTPATIENT
Start: 2023-10-25 | End: 2023-11-17

## 2023-10-25 RX ORDER — DEXTROAMPHETAMINE SACCHARATE, AMPHETAMINE ASPARTATE, DEXTROAMPHETAMINE SULFATE AND AMPHETAMINE SULFATE 5; 5; 5; 5 MG/1; MG/1; MG/1; MG/1
20 TABLET ORAL DAILY
Qty: 30 TABLET | Refills: 0 | Status: SHIPPED | OUTPATIENT
Start: 2023-11-19 | End: 2023-12-19

## 2023-10-25 ASSESSMENT — PATIENT HEALTH QUESTIONNAIRE - PHQ9
SUM OF ALL RESPONSES TO PHQ QUESTIONS 1-9: 1
5. POOR APPETITE OR OVEREATING: NOT AT ALL

## 2023-10-25 ASSESSMENT — ANXIETY QUESTIONNAIRES
GAD7 TOTAL SCORE: 2
1. FEELING NERVOUS, ANXIOUS, OR ON EDGE: NOT AT ALL
IF YOU CHECKED OFF ANY PROBLEMS ON THIS QUESTIONNAIRE, HOW DIFFICULT HAVE THESE PROBLEMS MADE IT FOR YOU TO DO YOUR WORK, TAKE CARE OF THINGS AT HOME, OR GET ALONG WITH OTHER PEOPLE: NOT DIFFICULT AT ALL
5. BEING SO RESTLESS THAT IT IS HARD TO SIT STILL: SEVERAL DAYS
6. BECOMING EASILY ANNOYED OR IRRITABLE: SEVERAL DAYS
2. NOT BEING ABLE TO STOP OR CONTROL WORRYING: NOT AT ALL
7. FEELING AFRAID AS IF SOMETHING AWFUL MIGHT HAPPEN: NOT AT ALL
GAD7 TOTAL SCORE: 2
3. WORRYING TOO MUCH ABOUT DIFFERENT THINGS: NOT AT ALL

## 2023-10-25 NOTE — PATIENT INSTRUCTIONS
Just request refills again in 3.5 month when needed  Then in clinic appt in 6 months  Let me know sooner if questions or concerns.

## 2023-10-25 NOTE — PROGRESS NOTES
"Daniel is a 28 year old who is being evaluated via a billable video visit.      How would you like to obtain your AVS? MyChart  If the video visit is dropped, the invitation should be resent by: Text to cell phone: [phone]  Will anyone else be joining your video visit? No          Assessment & Plan     Attention deficit hyperactivity disorder (ADHD), combined type  Stable, refill  - PRIMARY CARE FOLLOW-UP SCHEDULING  - amphetamine-dextroamphetamine (ADDERALL) 20 MG tablet; Take 1 tablet (20 mg) by mouth daily for 30 days  - amphetamine-dextroamphetamine (ADDERALL) 20 MG tablet; Take 1 tablet (20 mg) by mouth daily for 30 days  - amphetamine-dextroamphetamine (ADDERALL) 20 MG tablet; Take 1 tablet (20 mg) by mouth daily for 30 days    Moderate episode of recurrent major depressive disorder (H)  Stable, refill  - PRIMARY CARE FOLLOW-UP SCHEDULING  - DULoxetine (CYMBALTA) 30 MG capsule; Take 3 capsules (90 mg) by mouth daily    Anxiety  Stable, refill  - DULoxetine (CYMBALTA) 30 MG capsule; Take 3 capsules (90 mg) by mouth daily         BMI:   Estimated body mass index is 35.59 kg/m  as calculated from the following:    Height as of 4/19/23: 1.81 m (5' 11.25\").    Weight as of 4/19/23: 116.6 kg (257 lb).   Weight management plan: Discussed healthy diet and exercise guidelines    See Patient Instructions    Nadeem Hernández MD  [facility]    Subjective   Daniel is a 28 year old, presenting for the following health issues:  A.D.H.D (Adderall) and Depression (Cymbalta recheck)      10/25/2023     4:00 PM   Additional Questions   Roomed by Paula Garvin   Accompanied by self         10/25/2023     4:00 PM   Patient Reported Additional Medications   Patient reports taking the following new medications none       HPI     Depression and Anxiety Follow-Up  How are you doing with your depression since your last visit? Stable  How are you doing with your anxiety since your last visit?  Stable  Are " you having other symptoms that might be associated with depression or anxiety? No  Have you had a significant life event? No   Do you have any concerns with your use of alcohol or other drugs? No    Social History     Tobacco Use    Smoking status: Former     Packs/day: 0     Types: Cigarettes     Quit date: 12/10/2017     Years since quittin.8    Smokeless tobacco: Never    Tobacco comments:     patient vapes    Vaping Use    Vaping Use: Every day    Substances: Nicotine   Substance Use Topics    Alcohol use: Yes     Comment: socially     Drug use: No         2022    11:13 AM 2023     1:21 PM 2023     7:43 AM   PHQ   PHQ-9 Total Score 5 4 5   Q9: Thoughts of better off dead/self-harm past 2 weeks Not at all Not at all Not at all         2021     9:55 AM 2021     9:58 AM 2022    11:13 AM   BILLY-7 SCORE   Total Score 7 8 5         10/25/2023     4:06 PM   Last PHQ-9   1.  Little interest or pleasure in doing things 0   2.  Feeling down, depressed, or hopeless 0   3.  Trouble falling or staying asleep, or sleeping too much 1   4.  Feeling tired or having little energy 0   5.  Poor appetite or overeating 0   6.  Feeling bad about yourself 0   7.  Trouble concentrating 0   8.  Moving slowly or restless 0   Q9: Thoughts of better off dead/self-harm past 2 weeks 0   PHQ-9 Total Score 1   Difficulty at work, home, or with people Somewhat difficult         10/25/2023     4:06 PM   BILLY-7    1. Feeling nervous, anxious, or on edge 0   2. Not being able to stop or control worrying 0   3. Worrying too much about different things 0   4. Trouble relaxing 0   5. Being so restless that it is hard to sit still 1   6. Becoming easily annoyed or irritable 1   7. Feeling afraid, as if something awful might happen 0   BILLY-7 Total Score 2   If you checked any problems, how difficult have they made it for you to do your work, take care of things at home, or get along with other people? Not difficult at all        Suicide Assessment Five-step Evaluation and Treatment (SAFE-T)      Recheck ADHD/ADD. Adderall 20 MG    Updates since last visit: Going good.  Routine for taking medicine, including time:  Patient states he is working nights right now so he takes his Adderall at bout 6:30pm. But will switching to day shift tomorrow. Will take it at about 6-6:30 Am tomorrow.  Time medicine wears off: 7-8 hours  Issues at school/Work: None  Issues at home: None  Control of symptoms: Well controlled    Side effects:  Headaches: No  Stomach aches: No  Irritability/mood swings: No  Difficulties with sleep: No  Social withdrawal: No  Unusual movements/tics: No  Decreased appetite: No    Other concerns: None    Working plow and snow and road        Review of Systems         Objective    Vitals - Patient Reported  Pain Score: No Pain (0)        Physical Exam   GENERAL: Healthy, alert and no distress  EYES: Eyes grossly normal to inspection.  No discharge or erythema, or obvious scleral/conjunctival abnormalities.  RESP: No audible wheeze, cough, or visible cyanosis.  No visible retractions or increased work of breathing.    SKIN: Visible skin clear. No significant rash, abnormal pigmentation or lesions.  NEURO: Cranial nerves grossly intact.  Mentation and speech appropriate for age.  PSYCH: Mentation appears normal, affect normal/bright, judgement and insight intact, normal speech and appearance well-groomed.                Video-Visit Details    Type of service:  Video Visit     Originating Location (pt. Location): Home    Distant Location (provider location):  On-site  Platform used for Video Visit: Ancanco

## 2023-11-17 ENCOUNTER — MYC REFILL (OUTPATIENT)
Dept: FAMILY MEDICINE | Facility: CLINIC | Age: 28
End: 2023-11-17
Payer: COMMERCIAL

## 2023-11-17 DIAGNOSIS — F41.9 ANXIETY: ICD-10-CM

## 2023-11-17 DIAGNOSIS — F33.1 MODERATE EPISODE OF RECURRENT MAJOR DEPRESSIVE DISORDER (H): ICD-10-CM

## 2023-11-20 RX ORDER — DULOXETIN HYDROCHLORIDE 30 MG/1
90 CAPSULE, DELAYED RELEASE ORAL DAILY
Qty: 90 CAPSULE | Refills: 5 | Status: SHIPPED | OUTPATIENT
Start: 2023-11-20 | End: 2024-04-26

## 2023-12-08 ENCOUNTER — MYC REFILL (OUTPATIENT)
Dept: FAMILY MEDICINE | Facility: CLINIC | Age: 28
End: 2023-12-08
Payer: COMMERCIAL

## 2023-12-08 DIAGNOSIS — F33.1 MODERATE EPISODE OF RECURRENT MAJOR DEPRESSIVE DISORDER (H): ICD-10-CM

## 2023-12-08 DIAGNOSIS — F41.9 ANXIETY: ICD-10-CM

## 2023-12-08 RX ORDER — DULOXETIN HYDROCHLORIDE 30 MG/1
90 CAPSULE, DELAYED RELEASE ORAL DAILY
Qty: 90 CAPSULE | Refills: 5 | Status: CANCELLED | OUTPATIENT
Start: 2023-12-08

## 2023-12-13 ENCOUNTER — MYC REFILL (OUTPATIENT)
Dept: FAMILY MEDICINE | Facility: CLINIC | Age: 28
End: 2023-12-13
Payer: COMMERCIAL

## 2023-12-13 DIAGNOSIS — F33.1 MODERATE EPISODE OF RECURRENT MAJOR DEPRESSIVE DISORDER (H): ICD-10-CM

## 2023-12-13 DIAGNOSIS — F41.9 ANXIETY: ICD-10-CM

## 2023-12-14 RX ORDER — DULOXETIN HYDROCHLORIDE 30 MG/1
90 CAPSULE, DELAYED RELEASE ORAL DAILY
Qty: 90 CAPSULE | Refills: 5 | OUTPATIENT
Start: 2023-12-14

## 2023-12-14 NOTE — TELEPHONE ENCOUNTER
Spoke with K[facility] pharmacist at [facility] who states they have no record of receiving the cymbalta 30 mg #90 + 5 refills on 11/20/23.    RX printed and faxed per their request to [facility] [phone], patient notified.    Julie Behrendt RN

## 2024-01-04 ENCOUNTER — PATIENT OUTREACH (OUTPATIENT)
Dept: CARE COORDINATION | Facility: CLINIC | Age: 29
End: 2024-01-04
Payer: COMMERCIAL

## 2024-01-18 ENCOUNTER — PATIENT OUTREACH (OUTPATIENT)
Dept: CARE COORDINATION | Facility: CLINIC | Age: 29
End: 2024-01-18
Payer: COMMERCIAL

## 2024-04-07 ENCOUNTER — HEALTH MAINTENANCE LETTER (OUTPATIENT)
Age: 29
End: 2024-04-07

## 2024-04-26 ENCOUNTER — OFFICE VISIT (OUTPATIENT)
Dept: FAMILY MEDICINE | Facility: CLINIC | Age: 29
End: 2024-04-26
Attending: FAMILY MEDICINE
Payer: COMMERCIAL

## 2024-04-26 VITALS
HEART RATE: 94 BPM | DIASTOLIC BLOOD PRESSURE: 72 MMHG | RESPIRATION RATE: 20 BRPM | SYSTOLIC BLOOD PRESSURE: 104 MMHG | OXYGEN SATURATION: 98 % | HEIGHT: 72 IN | BODY MASS INDEX: 36.11 KG/M2 | TEMPERATURE: 97.7 F | WEIGHT: 266.6 LBS

## 2024-04-26 DIAGNOSIS — F33.1 MODERATE EPISODE OF RECURRENT MAJOR DEPRESSIVE DISORDER (H): Primary | ICD-10-CM

## 2024-04-26 DIAGNOSIS — F42.2 MIXED OBSESSIONAL THOUGHTS AND ACTS: ICD-10-CM

## 2024-04-26 DIAGNOSIS — F41.9 ANXIETY: ICD-10-CM

## 2024-04-26 PROCEDURE — 96127 BRIEF EMOTIONAL/BEHAV ASSMT: CPT | Mod: 59 | Performed by: FAMILY MEDICINE

## 2024-04-26 PROCEDURE — 99214 OFFICE O/P EST MOD 30 MIN: CPT | Performed by: FAMILY MEDICINE

## 2024-04-26 PROCEDURE — G2211 COMPLEX E/M VISIT ADD ON: HCPCS | Performed by: FAMILY MEDICINE

## 2024-04-26 RX ORDER — DULOXETIN HYDROCHLORIDE 60 MG/1
120 CAPSULE, DELAYED RELEASE ORAL DAILY
Qty: 60 CAPSULE | Refills: 1 | Status: SHIPPED | OUTPATIENT
Start: 2024-04-26 | End: 2024-05-31

## 2024-04-26 ASSESSMENT — ANXIETY QUESTIONNAIRES
7. FEELING AFRAID AS IF SOMETHING AWFUL MIGHT HAPPEN: NEARLY EVERY DAY
1. FEELING NERVOUS, ANXIOUS, OR ON EDGE: NEARLY EVERY DAY
3. WORRYING TOO MUCH ABOUT DIFFERENT THINGS: NEARLY EVERY DAY
GAD7 TOTAL SCORE: 19
IF YOU CHECKED OFF ANY PROBLEMS ON THIS QUESTIONNAIRE, HOW DIFFICULT HAVE THESE PROBLEMS MADE IT FOR YOU TO DO YOUR WORK, TAKE CARE OF THINGS AT HOME, OR GET ALONG WITH OTHER PEOPLE: VERY DIFFICULT
7. FEELING AFRAID AS IF SOMETHING AWFUL MIGHT HAPPEN: NEARLY EVERY DAY
8. IF YOU CHECKED OFF ANY PROBLEMS, HOW DIFFICULT HAVE THESE MADE IT FOR YOU TO DO YOUR WORK, TAKE CARE OF THINGS AT HOME, OR GET ALONG WITH OTHER PEOPLE?: VERY DIFFICULT
GAD7 TOTAL SCORE: 19
4. TROUBLE RELAXING: MORE THAN HALF THE DAYS
GAD7 TOTAL SCORE: 19
2. NOT BEING ABLE TO STOP OR CONTROL WORRYING: NEARLY EVERY DAY
6. BECOMING EASILY ANNOYED OR IRRITABLE: MORE THAN HALF THE DAYS
5. BEING SO RESTLESS THAT IT IS HARD TO SIT STILL: NEARLY EVERY DAY

## 2024-04-26 ASSESSMENT — PATIENT HEALTH QUESTIONNAIRE - PHQ9
10. IF YOU CHECKED OFF ANY PROBLEMS, HOW DIFFICULT HAVE THESE PROBLEMS MADE IT FOR YOU TO DO YOUR WORK, TAKE CARE OF THINGS AT HOME, OR GET ALONG WITH OTHER PEOPLE: VERY DIFFICULT
SUM OF ALL RESPONSES TO PHQ QUESTIONS 1-9: 12
SUM OF ALL RESPONSES TO PHQ QUESTIONS 1-9: 12

## 2024-04-26 ASSESSMENT — PAIN SCALES - GENERAL: PAINLEVEL: NO PAIN (0)

## 2024-04-26 NOTE — PROGRESS NOTES
"  Assessment & Plan     Moderate episode of recurrent major depressive disorder (H)  Not as well controlled  Increase cymbalta to 120mg daily and recheck in 5 weeks  - DULoxetine (CYMBALTA) 60 MG capsule; Take 2 capsules (120 mg) by mouth daily    Anxiety  Not as well controlled  Increase cymbalta to 120mg daily and recheck in 5 weeks  - DULoxetine (CYMBALTA) 60 MG capsule; Take 2 capsules (120 mg) by mouth daily    Mixed obsessional thoughts and acts  Worsening obessional thoughts and acts,  Not as well controlled  Add therapy through employee assistance program.  Increase cymbalta to 120mg daily and recheck in 5 weeks  - DULoxetine (CYMBALTA) 60 MG capsule; Take 2 capsules (120 mg) by mouth daily    The longitudinal plan of care for the diagnosis(es)/condition(s) as documented were addressed during this visit. Due to the added complexity in care, I will continue to support Daniel in the subsequent management and with ongoing continuity of care.      BMI  Estimated body mass index is 36.41 kg/m  as calculated from the following:    Height as of this encounter: 1.822 m (5' 11.75\").    Weight as of this encounter: 120.9 kg (266 lb 9.6 oz).   Weight management plan: Discussed healthy diet and exercise guidelines    Depression Screening Follow Up              Follow Up Actions Taken  Crisis resource information provided in the After Visit Summary    Discussed the following ways the patient can remain in a safe environment:  be around others    See Patient Instructions    Kennedi Sanchez is a 28 year old, presenting for the following health issues:  A.D.H.D (Med recheck)      4/26/2024     1:49 PM   Additional Questions   Roomed by Paula Garvin   Accompanied by self         4/26/2024     1:49 PM   Patient Reported Additional Medications   Patient reports taking the following new medications none     - Stopped taking the Adderall about 5 months. Patient states he does not want to get back on it or get on anything " "else. Doesn't feel any different.    A.D.H.D    History of Present Illness       Reason for visit:  Medication    He eats 0-1 servings of fruits and vegetables daily.He consumes 1 sweetened beverage(s) daily.He exercises with enough effort to increase his heart rate 30 to 60 minutes per day.  He exercises with enough effort to increase his heart rate 3 or less days per week. He is missing 1 dose(s) of medications per week.      Depression and Anxiety   How are you doing with your depression since your last visit? No change, worse since being on nights, but moving to days. Overthinking things. \"Hope I didn't hit anyone\" \"I wonder if I bumped into someone\" having to ask or go back and check if I walked on someone's shoe.  How are you doing with your anxiety since your last visit?  Worsened. Has been nights but is starting back on days.  Are you having other symptoms that might be associated with depression or anxiety? Yes:  OCD  Have you had a significant life event? OTHER: stopped vaping clod turkey (has been more cortes since), work schedule changing.    Do you have any concerns with your use of alcohol or other drugs? No      In past tried fluoxetine didn't seem to help so stopped the medication. 2013  Tried sertraline [id number]  somewhat helpful.    Diagnosed with ADHD in grade school.     Started Cymbalta 2017 with Dr. Greenwood, helpful at first and then off the Adderall too since 2 years ago too.      Started melatonin with nightmares.     No history of Lisa, no visual or auditory hallucinations.  Has OCD thinking that people who didn't wash hands so then patient needs to wash hands over, can delay getting to work on time sometimes.  No thoughts of paranoia.     Caffeine: not every day this week.  Alcohol: 4 cans of 13% [place] a week typically on a weekend.  Vaping: stopped  Drug/THC: None     Working for [place] [employer]      Social History     Tobacco Use    Smoking status: Former     Current packs/day: 0.00     " Types: Cigarettes     Quit date: 12/10/2017     Years since quittin.3    Smokeless tobacco: Never    Tobacco comments:     patient vapes    Vaping Use    Vaping status: Former    Substances: Nicotine   Substance Use Topics    Alcohol use: Yes     Comment: socially     Drug use: No         2023     7:43 AM 10/25/2023     4:06 PM 2024     1:47 PM   PHQ   PHQ-9 Total Score 5 1 12   Q9: Thoughts of better off dead/self-harm past 2 weeks Not at all Not at all Several days   F/U: Thoughts of suicide or self-harm   No   F/U: Safety concerns   Yes         2022    11:13 AM 10/25/2023     4:06 PM 2024     1:48 PM   BILLY-7 SCORE   Total Score   19 (severe anxiety)   Total Score 5 2 19         2024     1:47 PM   Last PHQ-9   1.  Little interest or pleasure in doing things 1   2.  Feeling down, depressed, or hopeless 1   3.  Trouble falling or staying asleep, or sleeping too much 2   4.  Feeling tired or having little energy 2   5.  Poor appetite or overeating 3   6.  Feeling bad about yourself 2   7.  Trouble concentrating 0   8.  Moving slowly or restless 0   Q9: Thoughts of better off dead/self-harm past 2 weeks 1   PHQ-9 Total Score 12   In the past two weeks have you had thoughts of suicide or self harm? No   Do you have concerns about your personal safety or the safety of others? Yes         2024     1:48 PM   BILLY-7    1. Feeling nervous, anxious, or on edge 3   2. Not being able to stop or control worrying 3   3. Worrying too much about different things 3   4. Trouble relaxing 2   5. Being so restless that it is hard to sit still 3   6. Becoming easily annoyed or irritable 2   7. Feeling afraid, as if something awful might happen 3   BILLY-7 Total Score 19   If you checked any problems, how difficult have they made it for you to do your work, take care of things at home, or get along with other people? Very difficult       Follow Up Actions Taken  Crisis resource information provided in the  "After Visit Summary     Discussed the following ways the patient can remain in a safe environment:  be around others  Suicide Assessment Five-step Evaluation and Treatment (SAFE-T)          Objective    /72 (BP Location: Right arm, Patient Position: Sitting, Cuff Size: Adult Large)   Pulse 94   Temp 97.7  F (36.5  C) (Tympanic)   Resp 20   Ht 1.822 m (5' 11.75\")   Wt 120.9 kg (266 lb 9.6 oz)   SpO2 98%   BMI 36.41 kg/m    Body mass index is 36.41 kg/m .  Physical Exam   GENERAL: alert and no distress  PSYCH: mentation appears normal, affect normal/bright, anxious. Patient stumbles through some questions, I can tell her want to ask a question, but not sure and, he asks me at the end \"Did I say anything wrong?\" \"Just going over everything I said to make sure\"            Signed Electronically by: Nadeem Hernández MD    "

## 2024-04-26 NOTE — PATIENT INSTRUCTIONS
Check out your employee assistance program for free therapy, today or tomorrow.    Increase cymbalata to 120mg a day (60mg pill, take two pills together)    Recheck in 5 weeks. May 31st at 1pm phone call.

## 2024-05-31 ENCOUNTER — VIRTUAL VISIT (OUTPATIENT)
Dept: FAMILY MEDICINE | Facility: CLINIC | Age: 29
End: 2024-05-31
Payer: COMMERCIAL

## 2024-05-31 DIAGNOSIS — F42.2 MIXED OBSESSIONAL THOUGHTS AND ACTS: ICD-10-CM

## 2024-05-31 DIAGNOSIS — F41.9 ANXIETY: ICD-10-CM

## 2024-05-31 DIAGNOSIS — F33.1 MODERATE EPISODE OF RECURRENT MAJOR DEPRESSIVE DISORDER (H): ICD-10-CM

## 2024-05-31 PROCEDURE — 99441 PR PHYSICIAN TELEPHONE EVALUATION 5-10 MIN: CPT | Mod: 93 | Performed by: FAMILY MEDICINE

## 2024-05-31 RX ORDER — DULOXETIN HYDROCHLORIDE 60 MG/1
120 CAPSULE, DELAYED RELEASE ORAL DAILY
Qty: 180 CAPSULE | Refills: 1 | Status: SHIPPED | OUTPATIENT
Start: 2024-05-31

## 2024-05-31 ASSESSMENT — ANXIETY QUESTIONNAIRES
4. TROUBLE RELAXING: SEVERAL DAYS
5. BEING SO RESTLESS THAT IT IS HARD TO SIT STILL: NEARLY EVERY DAY
IF YOU CHECKED OFF ANY PROBLEMS ON THIS QUESTIONNAIRE, HOW DIFFICULT HAVE THESE PROBLEMS MADE IT FOR YOU TO DO YOUR WORK, TAKE CARE OF THINGS AT HOME, OR GET ALONG WITH OTHER PEOPLE: NOT DIFFICULT AT ALL
8. IF YOU CHECKED OFF ANY PROBLEMS, HOW DIFFICULT HAVE THESE MADE IT FOR YOU TO DO YOUR WORK, TAKE CARE OF THINGS AT HOME, OR GET ALONG WITH OTHER PEOPLE?: NOT DIFFICULT AT ALL
6. BECOMING EASILY ANNOYED OR IRRITABLE: NOT AT ALL
1. FEELING NERVOUS, ANXIOUS, OR ON EDGE: NOT AT ALL
GAD7 TOTAL SCORE: 6
3. WORRYING TOO MUCH ABOUT DIFFERENT THINGS: SEVERAL DAYS
7. FEELING AFRAID AS IF SOMETHING AWFUL MIGHT HAPPEN: NOT AT ALL
GAD7 TOTAL SCORE: 6
GAD7 TOTAL SCORE: 6
2. NOT BEING ABLE TO STOP OR CONTROL WORRYING: SEVERAL DAYS
7. FEELING AFRAID AS IF SOMETHING AWFUL MIGHT HAPPEN: NOT AT ALL

## 2024-05-31 ASSESSMENT — PATIENT HEALTH QUESTIONNAIRE - PHQ9: SUM OF ALL RESPONSES TO PHQ QUESTIONS 1-9: 1

## 2024-05-31 NOTE — PROGRESS NOTES
Daniel is a 28 year old who is being evaluated via a billable telephone visit.    What phone number would you like to be contacted at? [phone]  How would you like to obtain your AVS? Junaid  Originating Location (pt. Location): Home    Distant Location (provider location):  On-site    Assessment & Plan     Moderate episode of recurrent major depressive disorder (H)  Improved, refill  Recheck in 6 months.  Sooner if concerns.  - DULoxetine (CYMBALTA) 60 MG capsule; Take 2 capsules (120 mg) by mouth daily    Anxiety  Stable, refill  Recheck in 6 months.  Sooner if concerns.  - DULoxetine (CYMBALTA) 60 MG capsule; Take 2 capsules (120 mg) by mouth daily    Mixed obsessional thoughts and acts  Improved, refill  Recheck in 6 months.  Sooner if concerns.  - DULoxetine (CYMBALTA) 60 MG capsule; Take 2 capsules (120 mg) by mouth daily            See Patient Instructions    Subjective   Daniel is a 28 year old, presenting for the following health issues:  Depression      5/31/2024    12:50 PM   Additional Questions   Roomed by Paula Garvin   Accompanied by self         5/31/2024    12:50 PM   Patient Reported Additional Medications   Patient reports taking the following new medications none     History of Present Illness       Mental Health Follow-up:  Patient presents to follow-up on Depression & Anxiety.Patient's depression since last visit has been:  Good  The patient is not having other symptoms associated with depression.  Patient's anxiety since last visit has been:  Good  The patient is not having other symptoms associated with anxiety.  Any significant life events: No  Patient is not feeling anxious or having panic attacks.  Patient has no concerns about alcohol or drug use.    He eats 0-1 servings of fruits and vegetables daily.He consumes 0 sweetened beverage(s) daily.He exercises with enough effort to increase his heart rate 30 to 60 minutes per day.  He exercises with enough effort to increase his heart  rate 3 or less days per week. He is missing 1 dose(s) of medications per week.  He is not taking prescribed medications regularly due to remembering to take.     At last appt 4/26/24 increased cymbalta to 120mg daily which is going well.   The OCD thoughts about feeling like he is saying something to offend someone and still checks, but not as bothersome as previously.  Was having some vaping withdrawls too previously.            Objective    Vitals - Patient Reported  Pain Score: No Pain (0)        Physical Exam   General: Alert and no distress //Respiratory: No audible wheeze, cough, or shortness of breath // Psychiatric:  Appropriate affect, tone, and pace of words          Phone call duration: 5 minutes  Signed Electronically by: Nadeem Hernández MD

## 2024-11-20 ENCOUNTER — ANCILLARY PROCEDURE (OUTPATIENT)
Dept: GENERAL RADIOLOGY | Facility: CLINIC | Age: 29
End: 2024-11-20
Attending: PHYSICIAN ASSISTANT
Payer: COMMERCIAL

## 2024-11-20 ENCOUNTER — OFFICE VISIT (OUTPATIENT)
Dept: FAMILY MEDICINE | Facility: CLINIC | Age: 29
End: 2024-11-20
Payer: COMMERCIAL

## 2024-11-20 VITALS
BODY MASS INDEX: 33.56 KG/M2 | WEIGHT: 247.8 LBS | DIASTOLIC BLOOD PRESSURE: 64 MMHG | SYSTOLIC BLOOD PRESSURE: 100 MMHG | TEMPERATURE: 97.8 F | HEART RATE: 101 BPM | HEIGHT: 72 IN | OXYGEN SATURATION: 98 % | RESPIRATION RATE: 20 BRPM

## 2024-11-20 DIAGNOSIS — F41.9 ANXIETY: ICD-10-CM

## 2024-11-20 DIAGNOSIS — F33.1 MODERATE EPISODE OF RECURRENT MAJOR DEPRESSIVE DISORDER (H): ICD-10-CM

## 2024-11-20 DIAGNOSIS — M25.532 LEFT WRIST PAIN: ICD-10-CM

## 2024-11-20 DIAGNOSIS — F42.2 MIXED OBSESSIONAL THOUGHTS AND ACTS: ICD-10-CM

## 2024-11-20 DIAGNOSIS — M25.532 LEFT WRIST PAIN: Primary | ICD-10-CM

## 2024-11-20 RX ORDER — DULOXETIN HYDROCHLORIDE 60 MG/1
120 CAPSULE, DELAYED RELEASE ORAL DAILY
Qty: 180 CAPSULE | Refills: 3 | Status: SHIPPED | OUTPATIENT
Start: 2024-11-20

## 2024-11-20 ASSESSMENT — PATIENT HEALTH QUESTIONNAIRE - PHQ9
10. IF YOU CHECKED OFF ANY PROBLEMS, HOW DIFFICULT HAVE THESE PROBLEMS MADE IT FOR YOU TO DO YOUR WORK, TAKE CARE OF THINGS AT HOME, OR GET ALONG WITH OTHER PEOPLE: SOMEWHAT DIFFICULT
SUM OF ALL RESPONSES TO PHQ QUESTIONS 1-9: 11
SUM OF ALL RESPONSES TO PHQ QUESTIONS 1-9: 11

## 2024-11-20 ASSESSMENT — PAIN SCALES - GENERAL: PAINLEVEL_OUTOF10: NO PAIN (0)

## 2024-11-20 NOTE — PATIENT INSTRUCTIONS
Emir Sanchez,    Thank you for allowing [facility] to manage your care.    I am unsure of the cause of your symptoms, but this could be carpal tunnel syndrome vs tendonitis vs other. We will see what our workup shows.     If you develop worsening/changing symptoms at any time, please be seen in clinic/urgent care.    I ordered some xrays. Please go to our radiology department to get your xrays.    I sent your prescriptions to your pharmacy.    I made a referral to hand therapy as needed. They will be calling in approximately 1 week to set up your appointment.  If you do not hear from them, please call the specialty number on your after visit summary.     Please allow 1-2 business days for our office to contact you in regards to your laboratory/radiological studies.  If not done so, I encourage you to login into [facility] ([email or web address] to review your results as well.     For your pain, please use Tylenol 1000mg every 6 hours. You may use 800mg of ibuprofen between doses of Tylenol.     Max acetaminophen (Tylenol) 4,000mg/24 hours  Max ibuprofen 3,000mg/24 hours    If you have any questions or concerns, please feel free to call us at [phone]    Sincerely,    Selvin Molina PA-C    Did you know?      You can schedule a video visit for follow-up appointments as well as future appointments for certain conditions.  Please see the below link.     [email or web address]    If you have not already done so,  I encourage you to sign up for [employer] ([email or web address]).  This will allow you to review your results, securely communicate with a provider, and schedule virtual visits as well.    Examples of Relaxation or Mindfulness Apps (available for download on Android and iOS)  Headspace  CBT-I : helps with anxiety and insomnia  Moodpath: helps with depression and/or anxiety  Mindfulness : learn mindfulness and meditation skills to  "help with depression and anxiety  PTSD : helps address trauma  Mindshift: helps teens and young adults who have depression or anxiety     Books to help with anxiety/depression  The Chemistry of Filomena by Zackery Brown (also has workbook)  The Chemistry of Calm by Zackery Brown     Examples of Online Support Options     [employer] (https://CaseStack/anxiety-depression-support/about): online anxiety and depression support group through the Anxiety and Depression Association of [place].  Mood Disorders Society of [place] Forum (http://www.mdsc.ca/forum/): online forums for a variety of topics including general mood disorders, bipolar disorder, depression, addiction, etc.     National Suicide Prevention Hotline: Call 6-256-694-GKCW (4319)     Crisis Text Line:  Text to 086666     Disaster Distress Helpline: Call 1-666.264.3206 or Text \"TalkWithUs\" to 75473    Crisis number information adults     988 Suicide and Crisis Lifeline    Call [facility] [place] Crisis line: [phone] if you are experiencing a mental health crisis. They provide phone counseling and a mobile response team 24 hours a day, 7 days a week. If needed, they can come to where you are to provide crisis intervention.     Other [facility] Crisis lines:   [place] [place] Adult 1-435.332.8425/Child [phone]    \[facility] [place] [facility]\"" [phone]    [facility] [phone]    "

## 2024-11-20 NOTE — RESULT ENCOUNTER NOTE
Team - please call patient with results. There is a tiny bone fragment likely from injury long ago on his wrist x-ray. I would like him to do the hand therapy as ordered but also see sports medicine if not improving in the next month to ensure this does not need further workup. Referral placed. Please pass on number.

## 2024-11-20 NOTE — LETTER
November 20, 2024      Daniel Thomas  [id number] [address] [address] [address]        To Whom It May Concern:    Daniel Thomas  was seen on 11/20/2024.  Please excuse him due to appointment.        Sincerely,        CARLOS Tobin

## 2024-11-20 NOTE — PROGRESS NOTES
Assessment & Plan   Problem List Items Addressed This Visit          Behavioral    OCD (obsessive compulsive disorder)    Relevant Medications    DULoxetine (CYMBALTA) 60 MG capsule    Moderate episode of recurrent major depressive disorder (H)    Relevant Medications    DULoxetine (CYMBALTA) 60 MG capsule       Other    Anxiety    Relevant Medications    DULoxetine (CYMBALTA) 60 MG capsule     Other Visit Diagnoses       Left wrist pain    -  Primary    Relevant Orders    XR Wrist Left G/E 3 Views (Completed)    Occupational Therapy  Referral    Orthopedic  Referral           Depression  - Patient reports episodic depressive symptoms, with periods of remission and exacerbation. Reports suicidal ideation during depressive episodes, but not currently in that state. Currently on Cymbalta 120mg daily.  - Advise adherence to pharmacotherapy. Patient to explore employee assistance program for psychotherapy. Declined referral from me. Refill Cymbalta prescription.  -contracts for safety verbally    Left wrist pain  - Patient reports left wrist pain that has been progressively worsening over the past month. Pain exacerbated by heavy use and lifting heavy objects. Describes pain as diffuse around the wrist, throbbing in nature. No significant findings on physical exam.  - Order wrist X-ray. Refer to hand therapy. Recommend trial of acetaminophen/ibuprofen. -xray shows ossific fragment near the lunate, though no point tenderness at this spot. Recommended prn brace and orthopedic consultation if no improvement.    Prescription  Refill Cymbalta 120mg daily     Complete history and physical exam as below. Afebrile with normal vital signs.    DDx and Dx discussed with and explained to the pt to their satisfaction.  All questions were answered at this time. Pt expressed understanding of and agreement with this dx, tx, and plan. No further workup warranted and standard medication warnings given. I have  "given the patient a list of pertinent indications for re-evaluation. Will go to the Emergency Department if symptoms worsen or new concerning symptoms arise. Patient left in no apparent distress.   Ordering of each unique test  Prescription drug management  30 minutes spent by me on the date of the encounter doing chart review, history and exam, documentation and further activities per the note  BMI  Estimated body mass index is 33.75 kg/m  as calculated from the following:    Height as of this encounter: 1.825 m (5' 11.85\").    Weight as of this encounter: 112.4 kg (247 lb 12.8 oz).     Depression Screening Follow Up        11/20/2024     6:55 AM   PHQ   PHQ-9 Total Score 11    Q9: Thoughts of better off dead/self-harm past 2 weeks Several days    F/U: Thoughts of suicide or self-harm No    F/U: Safety concerns Yes        Patient-reported         4/26/2024     1:47 PM 5/31/2024    12:54 PM 11/20/2024     6:55 AM   PHQ   PHQ-9 Total Score 12 1 11    Q9: Thoughts of better off dead/self-harm past 2 weeks Several days  Not at all Several days    F/U: Thoughts of suicide or self-harm No   No    F/U: Safety concerns Yes   Yes        Patient-reported         10/25/2023     4:06 PM 4/26/2024     1:48 PM 5/31/2024    12:54 PM   BILLY-7 SCORE   Total Score  19 (severe anxiety) 6 (mild anxiety)   Total Score 2 19 6       Follow Up Actions Taken  Crisis resource information provided in the After Visit Summary  Patient declined referral.    Discussed the following ways the patient can remain in a safe environment:  be around others  See Patient Instructions      Kennedi Sanchez is a 29 year old, presenting for the following health issues:  Recheck Medication and Wrist Pain        11/20/2024     7:01 AM   Additional Questions   Roomed by Faisal Rawls CMA   Accompanied by N/A         11/20/2024   Forms   Any forms needing to be completed Yes          11/20/2024     7:01 AM   Patient Reported Additional Medications   Patient " "reports taking the following new medications No new medications     History of Present Illness       Reason for visit:  Medication and wrist  Symptom onset:  More than a month  Symptoms include:  Wrist hurts when using for lifting or using weighted objects  Symptom intensity:  Moderate  Symptom progression:  Staying the same  Had these symptoms before:  No  What makes it worse:  Lifting weights  What makes it better:  Not using my left wrist   He is taking medications regularly.    Wrist:   -last winter noticed some issues while lifting heavy things at work   -around a month ago noticed a consistent pain   -throbbing pain   -left wrist   -bought wrist brace around 3 weeks ago- wears consistently   -noticeable swelling   -pain= band around wrist   -pain w/ phalens   -worse when lifting things  -worsens continuous movement     Medication Followup of Duloxetine  Taking Medication as prescribed: yes  Side Effects:  None  Medication Helping Symptoms:  yes  -Depression comes and goes; feels like he is in a better area now   -Relationship issues- not w/ GF   -All relationship  based issues   -going to gym more and eating better   -good dosage spot   -is becoming more consistent- takes it between 12-4   -Therapy: program at work     Review of Systems  Constitutional, HEENT, cardiovascular, pulmonary, gi and gu systems are negative, except as otherwise noted.      Objective    /64   Pulse 101   Temp 97.8  F (36.6  C) (Temporal)   Resp 20   Ht 1.825 m (5' 11.85\")   Wt 112.4 kg (247 lb 12.8 oz)   SpO2 98%   BMI 33.75 kg/m    Body mass index is 33.75 kg/m .  Physical Exam  Vitals and nursing note reviewed.   Constitutional:       General: He is not in acute distress.     Appearance: Normal appearance. He is not diaphoretic.   HENT:      Head: Normocephalic and atraumatic.      Nose: Nose normal.   Eyes:      Conjunctiva/sclera: Conjunctivae normal.   Pulmonary:      Effort: Pulmonary effort is normal. No " respiratory distress.   Musculoskeletal:      Comments: LUE: wrist non-tender.  No overlying signs of trauma or infection. Distal CMS intact. Remainder of limb non-tender including the snuffbox.   Skin:     General: Skin is dry.      Coloration: Skin is not jaundiced or pale.   Neurological:      General: No focal deficit present.      Mental Status: He is alert. Mental status is at baseline.   Psychiatric:         Mood and Affect: Mood normal.         Behavior: Behavior normal.      Comments: Endorses occasional passive SI, but denies plan or intent. Denies HI or hallucinations. Does own a gun. Denies safety concerns despite his answer on PHQ9. Feels he may have misread the question.          Results for orders placed or performed in visit on 11/20/24   XR Wrist Left G/E 3 Views     Status: None    Narrative    XR WRIST LEFT G/E 3 VIEWS   11/20/2024 8:17 AM     HISTORY: Left wrist pain  COMPARISON: None.       Impression    IMPRESSION: Tiny, age-indeterminate ossific fragment at the ulnar  aspect of the proximal lunate without discrete donor site. Otherwise  negative left wrist.    IRA RASHID MD         SYSTEM ID:  [id number]           Signed Electronically by: CARLOS Tobin

## 2024-11-21 ENCOUNTER — PATIENT OUTREACH (OUTPATIENT)
Dept: CARE COORDINATION | Facility: CLINIC | Age: 29
End: 2024-11-21
Payer: COMMERCIAL

## 2024-11-25 ENCOUNTER — PATIENT OUTREACH (OUTPATIENT)
Dept: CARE COORDINATION | Facility: CLINIC | Age: 29
End: 2024-11-25
Payer: COMMERCIAL

## 2024-12-31 ENCOUNTER — THERAPY VISIT (OUTPATIENT)
Dept: OCCUPATIONAL THERAPY | Facility: CLINIC | Age: 29
End: 2024-12-31
Attending: PHYSICIAN ASSISTANT
Payer: COMMERCIAL

## 2024-12-31 DIAGNOSIS — M25.532 LEFT WRIST PAIN: ICD-10-CM

## 2024-12-31 NOTE — PROGRESS NOTES
OCCUPATIONAL THERAPY EVALUATION  Type of Visit: {Visit Type:757244}    {Disappearing TIP  Adult Abuse Screen not completed in this Encounter. Please complete screening!:752724}      {TIP  The Fall Risk Screen has not been completed. Complete the screen!:119138}    Subjective      {Disappearing TIP  Health History pop-up / flowsheet :522141}  Presenting condition or subjective complaint: (Patient-Rptd) left hand by wrist. Patient relates that  his hand possibly got hit when trying to hold a guard rail last winter but really has not noticed any pain until the last few months. He started going to the gym  lifting weights and doing dips  and started noticing wrist pain and at work  Date of onset:    {Disapparing TIP  Date of Onset/Injury :142846}  Relevant medical history:     Dates & types of surgery:      Prior diagnostic imaging/testing results: (Patient-Rptd) X-ray   IMPRESSION: Tiny, age-indeterminate ossific fragment at the ulnar  aspect of the proximal lunate without discrete donor site. Otherwise  negative left wrist.  Prior therapy history for the same diagnosis, illness or injury: (Patient-Rptd) No      Prior Level of Function  Transfers: Independent  Ambulation: Independent  ADL: Independent  IADL:  ind    Living Environment  Social support: (Patient-Rptd) Alone   Type of home: (Patient-Rptd) Apartment/condo   Stairs to enter the home:         Ramp: (Patient-Rptd) No   Stairs inside the home: (Patient-Rptd) Yes (Patient-Rptd) 1 Is there a railing: (Patient-Rptd) Yes     Help at home: (Patient-Rptd) Self Cares (home health aide/personal care attendant, family, etc); Medication and/or finances  Equipment owned:       Employment: (Patient-Rptd) Yes (Patient-Rptd) [employer]  Hobbies/Interests: (Patient-Rptd) Gym, fishing, video games    Patient goals for therapy: (Patient-Rptd) not have to take pain pills beforebworking out and not hurting when using left wrist    Pain assessment: See objective evaluation for  additional pain details     Objective   ADDITIONAL HISTORY:  Right hand dominant and Ambidextrous  Patient reports symptoms of pain and edema  Transportation: drives  Currently working in normal job without restrictions    Functional Outcome Measure:       12/31/2024     1:59 PM   Upper Extremity Functional Index (  [address] [place] [address] [place])   a.  Any of your usual work, housework or school activities 1-Quite a bit of Difficulty   b.  Your usual hobbies, recreational or sporting activities 1-Quite a bit of Difficulty   c.  Lifting a bag of groceries to waist level 3-A Little bit of Difficulty   d.  Placing an object onto, or removing it from an overhead shelf 3-A Little bit of Difficulty   e.  Washing your hair or scalp 4-No Difficulty   f.   Pushing up on your hands (e.g., from bathtub or chair) 3-A Little bit of Difficulty   g.  Preparing food (e.g., peeling, cutting) 4-No Difficulty   h.  Driving 4-No Difficulty   I.   Vacuuming, sweeping, or raking 2-Moderate Difficulty   j.   Dressing 4-No Difficulty   k.  Doing up buttons 4-No Difficulty   l.   Using tools or appliances 1-Quite a bit of Difficulty   m. Opening doors 4-No Difficulty   n.  Cleaning 4-No Difficulty   o.  Tying or lacing shoes 4-No Difficulty   p.  Sleeping 4-No Difficulty   q.  Laundering clothes. (e.g., washing, ironing, folding) 4-No Difficulty   r.   Opening a jar 3-A Little bit of Difficulty   s.  Throwing a ball 1-Quite a bit of Difficulty   t.   Carrying a small suitcase with your affected limb  2-Moderate Difficulty   Column Totals: /80 60        Patient-reported        PAIN:  Pain Level at Rest: 0/10  Pain Level with Use: 8/10  Pain Location: wrist and ulnar wrist and forearm  Pain Quality: Aching  Pain Frequency: intermittent  Pain is Worst: daytime, nighttime, or depends on if it is a gym day  Pain is Exacerbated By: gripping, lifting   Pain is Relieved By: rest  Pain Progression: Worsened    POSTURE: Normal     EDEMA:  DWC- right 19.5, left  19cm          SENSATION: WNL throughout all nerve distributions; per patient report           ROM: generally WNL hand and wrist with no increase in pain all planes  including supination        RESISTED TESTING:  no pain with resistance to wrist all planes  , however after eval and watching patient lift 15# dumbell , has some pain in wrist flexors    STRENGTH:     Measured in pounds 12/31/2024 12/31/2024    Left Right   Trial 1     Trial 2     Trial 3     Average 118 pain  in volar wrist 120     Lateral Pinch  Measured in pounds 12/31/2024 12/31/2024    Left Right   Trial 1     Trial 2     Trial 3     Average 26 23     3 Point Pinch  Measured in pounds 12/31/2024 12/31/2024    Left Right   Trial 1     Trial 2     Trial 3     Average 22 21       PALPATION:  no pain with palpation ,however is sore pointing to ulnar wrist TFCC area following evaluation    Increased pain in ulnar wrist with weightbearing and supination       Assessment & Plan   CLINICAL IMPRESSIONS  Medical Diagnosis:    {Disappaering TIP  Medical Dx :011231}  Treatment Diagnosis:    {Disappearing TIP  Treatment Dx :563120}  Impression/Assessment: {lotus ot assessment:029923}    Clinical Decision Making (Complexity):  Assessment of Occupational Performance: {Number of Occupations Affected:164589}  Occupational Performance Limitations: {Perf Deficits:786191}  Clinical Decision Making (Complexity): {Complexity:134850}    PLAN OF CARE  Treatment Interventions:  {:513681}    Long Term Goals {Disappearing TIP  Goals :506371}       {Disappearing TIP  Frequency/Duration :886598}  Frequency of Treatment:    Duration of Treatment:       Recommended Referrals to Other Professionals: {lotus referrals suggested:436690}  Education Assessment:   {Disappearing TIP  Patient Education :600847}    Risks and benefits of evaluation/treatment have been explained.   Patient/Family/caregiver agrees with Plan of Care.     Evaluation Time:  {Disappearing TIP  Eval Minutes  ":720361}     {OPTIONAL EVAL ONLY:294908::\"Evaluation Only\"}     Signing Clinician:Mary Galeana OTR/TIMOTHY CHT  Occupational Therapist, Certified Hand Therapist                     "

## 2025-01-07 ENCOUNTER — OFFICE VISIT (OUTPATIENT)
Dept: ORTHOPEDICS | Facility: CLINIC | Age: 30
End: 2025-01-07
Payer: COMMERCIAL

## 2025-01-07 DIAGNOSIS — M25.532 LEFT WRIST PAIN: Primary | ICD-10-CM

## 2025-01-07 PROCEDURE — 99204 OFFICE O/P NEW MOD 45 MIN: CPT | Performed by: STUDENT IN AN ORGANIZED HEALTH CARE EDUCATION/TRAINING PROGRAM

## 2025-01-07 RX ORDER — DICLOFENAC SODIUM 75 MG/1
75 TABLET, DELAYED RELEASE ORAL 2 TIMES DAILY WITH MEALS
Qty: 60 TABLET | Refills: 1 | Status: SHIPPED | OUTPATIENT
Start: 2025-01-07

## 2025-01-07 NOTE — PROGRESS NOTES
ASSESSMENT & PLAN    Daniel was seen today for pain.    Diagnoses and all orders for this visit:    Left wrist pain  -     MR Wrist Left w/o Contrast; Future  -     diclofenac (VOLTAREN) 75 MG EC tablet; Take 1 tablet (75 mg) by mouth 2 times daily (with meals).      This issue is chronic and Unchanged.      # Left wrist pain: Daniel Thomas  was seen today for left wrist pain. Symptoms had been going on for 3+ months. On examination there are positive findings of tenderness along distal ulna/ TFCC and with radial/ ulnar deviation. Imaging findings showed possible age indeterminate lunate fracture, which is no longer tender on exam, but may be related to a concurrent TFCC injury. Other possible conditions contributing to symptoms include tendinopathy of the ECU/ FCU.  Counseled patient on nature of condition and treatment options.    - Workup: MRI L wrist   - Activity: activity as tolerated. Continue hand therapy. Reports wrist widget caused discomfort/ swelling of the area  - Ice, heat, massage as needed  - Medications:      - diclofenac 75mg twice daily for 2-4 weeks.      - Okay to take tylenol 1000mg three times daily as needed in addition to these.    Follow-up: after MRI, sooner if worsening  Can consider referral to hand surgery    Romeo Sarmiento MD  [facility]    -----  Chief Complaint   Patient presents with    Left Wrist - Pain       SUBJECTIVE  Daniel Thomas is a/an 29 year old male who is seen as a self referral for evaluation of left wrist.     The patient is seen by themselves.  The patient is Right handed    Onset: 3 month(s) ago (~October 2024). Reports insidious onset without acute precipitating event. He reports noticing pain after he started working out at the gym more regularly.  The patient reports that he was hit by a bolt being hammered into a guardrail at work on the ulnar side of his hand/wrist. He denies lingering symptoms after this  incident.  Location of Pain: left wrist, entire wrist  Worsened by: gripping/squeezing, lifting, pushing with wrist in extension  Better with: rest, ibuprofen, Tylenol  Treatments tried: hand therapy (1 visits) wrist brace, ibuprofen, Tylenol  Associated symptoms: swelling - worse with wrist widget    Orthopedic/Surgical history: NO  Social History/Occupation: works for [employer]      REVIEW OF SYSTEMS:  Review of Systems    OBJECTIVE:  There were no vitals taken for this visit.   General: healthy, alert and in no distress  Skin: no suspicious lesions or rash.  CV: distal perfusion intact   Resp: normal respiratory effort without conversational dyspnea   Psych: normal mood and affect  Gait: NORMAL  Neuro: Normal light sensory exam of left upper extremity     LEFT WRIST  Inspection:    No swelling or obvious deformity or asymmetry  Palpation:    Tender about the TFCC> DRUJ. No lunate tenderness Remainder of bony and ligamentous line marks are nontender.     Metacarpals: normal    Thumb: normal    Fingers: normal  Range of Motion:    Full (active and passive) flexion, extension, pronation/supination, and ulnar/radial deviation. Pain with ulnar> radial deviation  Strength:    No deficits in flexion, extension, ulnar/radial deviation, or  strength.. Normal pinch strength.  Special Tests:    Positive: TFCC grind    RADIOLOGY:  Final results and radiologist's interpretation, available in the [facility] health record.  Images were reviewed with the patient in the office today.  My personal interpretation of the performed imaging:   - possible age indeterminate lunate fracture, which is no longer tender on exam, but may be related to a concurrent TFCC injury    XR WRIST LEFT G/E 3 VIEWS   11/20/2024 8:17 AM      HISTORY: Left wrist pain  COMPARISON: None.                                                                       IMPRESSION: Tiny, age-indeterminate ossific fragment at the ulnar  aspect of the proximal lunate without  discrete donor site. Otherwise  negative left wrist.     IRA RASHID MD

## 2025-01-07 NOTE — PATIENT INSTRUCTIONS
# Left wrist pain: Daniel Thomas  was seen today for left wrist pain. Symptoms had been going on for 3+ months. On examination there are positive findings of tenderness along distal ulna/ TFCC and with radial/ ulnar deviation. Imaging findings showed possible age indeterminate lunate fracture, which is no longer tender on exam, but may be related to a concurrent TFCC injury. Other possible conditions contributing to symptoms include tendinopathy of the ECU/ FCU.  Counseled patient on nature of condition and treatment options.    - Workup: MRI L wrist   - Activity: activity as tolerated. Continue hand therapy. Reports wrist widget caused discomfort/ swelling of the area  - Ice, heat, massage as needed  - Medications:      - diclofenac 75mg twice daily for 2-4 weeks.      - Okay to take tylenol 1000mg three times daily as needed in addition to these.    Follow-up: after MRI, sooner if worsening  Can consider referral to hand surgery    Please call [phone] and ask for my team if you have any questions or concerns.

## 2025-01-07 NOTE — LETTER
1/7/2025      Daniel Thomas  [address]  [place] [address]      Dear Colleague,    Thank you for referring your patient, Daniel Thomas, to the [facility] SPORTS [facility]. Please see a copy of my visit note below.    ASSESSMENT & PLAN    Daniel was seen today for pain.    Diagnoses and all orders for this visit:    Left wrist pain  -     MR Wrist Left w/o Contrast; Future  -     diclofenac (VOLTAREN) 75 MG EC tablet; Take 1 tablet (75 mg) by mouth 2 times daily (with meals).      This issue is chronic and Unchanged.      # Left wrist pain: Daniel Thomas  was seen today for left wrist pain. Symptoms had been going on for 3+ months. On examination there are positive findings of tenderness along distal ulna/ TFCC and with radial/ ulnar deviation. Imaging findings showed possible age indeterminate lunate fracture, which is no longer tender on exam, but may be related to a concurrent TFCC injury. Other possible conditions contributing to symptoms include tendinopathy of the ECU/ FCU.  Counseled patient on nature of condition and treatment options.    - Workup: MRI L wrist   - Activity: activity as tolerated. Continue hand therapy. Reports wrist widget caused discomfort/ swelling of the area  - Ice, heat, massage as needed  - Medications:      - diclofenac 75mg twice daily for 2-4 weeks.      - Okay to take tylenol 1000mg three times daily as needed in addition to these.    Follow-up: after MRI, sooner if worsening  Can consider referral to hand surgery    Romeo Sarmiento MD  [facility]    -----  Chief Complaint   Patient presents with     Left Wrist - Pain       SUBJECTIVE  Daniel Thomas is a/an 29 year old male who is seen as a self referral for evaluation of left wrist.     The patient is seen by themselves.  The patient is Right handed    Onset: 3 month(s) ago (~October 2024). Reports insidious onset without acute  precipitating event. He reports noticing pain after he started working out at the gym more regularly.  The patient reports that he was hit by a bolt being hammered into a guardrail at work on the ulnar side of his hand/wrist. He denies lingering symptoms after this incident.  Location of Pain: left wrist, entire wrist  Worsened by: gripping/squeezing, lifting, pushing with wrist in extension  Better with: rest, ibuprofen, Tylenol  Treatments tried: hand therapy (1 visits) wrist brace, ibuprofen, Tylenol  Associated symptoms: swelling - worse with wrist widget    Orthopedic/Surgical history: NO  Social History/Occupation: works for [employer]      REVIEW OF SYSTEMS:  Review of Systems    OBJECTIVE:  There were no vitals taken for this visit.   General: healthy, alert and in no distress  Skin: no suspicious lesions or rash.  CV: distal perfusion intact   Resp: normal respiratory effort without conversational dyspnea   Psych: normal mood and affect  Gait: NORMAL  Neuro: Normal light sensory exam of left upper extremity     LEFT WRIST  Inspection:    No swelling or obvious deformity or asymmetry  Palpation:    Tender about the TFCC> DRUJ. No lunate tenderness Remainder of bony and ligamentous line marks are nontender.     Metacarpals: normal    Thumb: normal    Fingers: normal  Range of Motion:    Full (active and passive) flexion, extension, pronation/supination, and ulnar/radial deviation. Pain with ulnar> radial deviation  Strength:    No deficits in flexion, extension, ulnar/radial deviation, or  strength.. Normal pinch strength.  Special Tests:    Positive: TFCC grind    RADIOLOGY:  Final results and radiologist's interpretation, available in the [facility] health record.  Images were reviewed with the patient in the office today.  My personal interpretation of the performed imaging:   - possible age indeterminate lunate fracture, which is no longer tender on exam, but may be related to a concurrent TFCC injury    XR  WRIST LEFT G/E 3 VIEWS   11/20/2024 8:17 AM      HISTORY: Left wrist pain  COMPARISON: None.                                                                       IMPRESSION: Tiny, age-indeterminate ossific fragment at the ulnar  aspect of the proximal lunate without discrete donor site. Otherwise  negative left wrist.     IRA RASHID MD            Again, thank you for allowing me to participate in the care of your patient.        Sincerely,        Romeo Sarmiento MD    Electronically signed

## 2025-01-07 NOTE — LETTER
2025    Daniel Thomas   1995        To Whom it May Concern;    Please excuse Daniel Thomas from work for a healthcare visit on 2025.    He is cleared for light duty for the next 2-4 weeks while we await results of an MRI.  - no lifting more than 5-10lbs  - no repetitive movement of the left arm/ wrist  - must be allowed to take breaks to rest the arm, lift    Plan for follow-up after MRI.    Sincerely,        Romeo Sarmiento MD

## 2025-01-21 ENCOUNTER — ANCILLARY PROCEDURE (OUTPATIENT)
Dept: MRI IMAGING | Facility: CLINIC | Age: 30
End: 2025-01-21
Attending: STUDENT IN AN ORGANIZED HEALTH CARE EDUCATION/TRAINING PROGRAM
Payer: COMMERCIAL

## 2025-01-21 ENCOUNTER — ANCILLARY PROCEDURE (OUTPATIENT)
Dept: GENERAL RADIOLOGY | Facility: CLINIC | Age: 30
End: 2025-01-21
Attending: STUDENT IN AN ORGANIZED HEALTH CARE EDUCATION/TRAINING PROGRAM
Payer: COMMERCIAL

## 2025-01-21 DIAGNOSIS — M25.532 LEFT WRIST PAIN: ICD-10-CM

## 2025-01-21 PROCEDURE — 73221 MRI JOINT UPR EXTREM W/O DYE: CPT | Mod: LT

## 2025-01-21 PROCEDURE — 73221 MRI JOINT UPR EXTREM W/O DYE: CPT | Mod: 26 | Performed by: RADIOLOGY

## 2025-01-21 PROCEDURE — 70030 X-RAY EYE FOR FOREIGN BODY: CPT | Mod: TC | Performed by: RADIOLOGY

## 2025-01-28 ENCOUNTER — TELEPHONE (OUTPATIENT)
Dept: ORTHOPEDICS | Facility: CLINIC | Age: 30
End: 2025-01-28
Payer: COMMERCIAL

## 2025-01-28 DIAGNOSIS — M25.532 LEFT WRIST PAIN: ICD-10-CM

## 2025-01-28 RX ORDER — DICLOFENAC SODIUM 75 MG/1
75 TABLET, DELAYED RELEASE ORAL 2 TIMES DAILY WITH MEALS
Qty: 60 TABLET | Refills: 1 | Status: SHIPPED | OUTPATIENT
Start: 2025-01-28

## 2025-01-28 NOTE — LETTER
January 28, 2025      Daniel Thomas  [address] [facility] [address]        To Whom It May Concern:    Daniel Thomas was seen in our clinic. He may return to light duty work with the following restrictions:   - no lifting more than 5-10lbs  - no repetitive movement of the left arm/ wrist  - must be allowed to take breaks to rest the arm, lift  - must be allowed to wear wrist brace as needed    These restrictions will be re-evaluated at a follow-up appointment in about 6 weeks (~3/11/2025).      Sincerely,       Romeo Sarmiento MD    Electronically signed

## 2025-01-28 NOTE — TELEPHONE ENCOUNTER
Romeo Sarmiento MD Murphy, Erin, ATC  Can you let him know his MRI results?    - ECU tendonitis which could explain the pain  - no obvious TFCC tear (soft spot on that side of the wrist)    The treatment for the tendinitis is the diclofenac 75mg twice daily for 2-4 weeks we prescribed, hand therapy, and braces as needed. If things are improving, let's plan to continue with this treatment.     If the combination of these do not improve pain over the course of 4-6 weeks, then that is where we could consider a cortisone injection to that tendon sheath or have you meet to discuss options with a hand specialist.     Thanks,  Romeo Sarmiento MD  =========================  Called and spoke with patient regarding his MRI results. He states that his wrist is feeling about the same. He has been taking the diclofenac once daily, occasionally not taking it due to forgetting. He is unsure if the diclofenac is helping his symptoms. He has his wrist brace but does not always wear it. He does not have any other hand therapy appointments scheduled at this time.    Discussed taking diclofenac 2x daily, which patient was agreeable to. He states that he needs a refill of diclofenac. Patient agreed to wear wrist brace more regularly during activities that aggravate his wrist pain. He also agreed to schedule hand therapy appointments. Patient expressed understanding of treatment plan and will follow up in 4-6 weeks if symptoms worsening or not improving.    Patient also requested updated work letter - letter pended, send to patient via [employer].  Diclofenac refill - pharmacy: [facility]    SHRUTHI Nguyen, ATC

## 2025-01-28 NOTE — TELEPHONE ENCOUNTER
Refill of diclofenac sent. Could consider alternative NSAID if not improving after 2-4 weeks as well.  Romeo Sarmiento MD

## 2025-04-09 ENCOUNTER — THERAPY VISIT (OUTPATIENT)
Dept: OCCUPATIONAL THERAPY | Facility: CLINIC | Age: 30
End: 2025-04-09
Attending: PHYSICIAN ASSISTANT
Payer: COMMERCIAL

## 2025-04-09 DIAGNOSIS — M25.532 LEFT WRIST PAIN: Primary | ICD-10-CM

## 2025-04-09 PROCEDURE — 97110 THERAPEUTIC EXERCISES: CPT | Mod: GO | Performed by: OCCUPATIONAL THERAPIST

## 2025-04-09 PROCEDURE — 97168 OT RE-EVAL EST PLAN CARE: CPT | Mod: GO | Performed by: OCCUPATIONAL THERAPIST

## 2025-04-09 PROCEDURE — 97535 SELF CARE MNGMENT TRAINING: CPT | Mod: GO | Performed by: OCCUPATIONAL THERAPIST

## 2025-04-09 NOTE — PROGRESS NOTES
PLAN  Continue therapy per current plan of care.    Beginning/End Dates of Progress Note Reporting Period:    12/31/24 o 04/09/2025    Referring Provider:  Alverto Molina    04/09/25 0500   Appointment Info   Treating Provider LIANE Booth/L CHT   Visits Used 2   Medical Diagnosis Left wrist pain   OT Tx Diagnosis left wrist pain affecting ADL's   Other pertinent information ECU tendonitis based on MD findings MRI results- motion degraded study; some sequences repeated multiple  times.  1. No definite TFCC tear on this motion degraded study.  2. Mild tendinopathy of the distal extensor carpi ulnaris tendon at  the level of the distal ulna.   3. No acute osseous abnormality.   Progress Note/Certification   Onset of Illness/Injury or Date of Surgery 10/16/24   Therapy Frequency 1x/week   Predicted Duration 6 weeks   Goals   OT Goals 2   OT Goal 1   Goal Identifier home program   Goal Description Patient to be independent with home ex program not needing more than 15% cuing   Rationale In order to maximize safety and independence with ADL/IADL's   Target Date 05/21/25   OT Goal 2   Goal Identifier working out   Goal Description Patient to be able to continue working out in modified way at gym to allow wrist to heal   Rationale In order to maximize safety and independence with ADL/IADL's   Target Date 05/14/25   Subjective Report   Subjective Report Patient relates he continues to have left wrist pain . Saw ortho and MRI negative for TFCC involvement. He mostly has pain with. Has been backing off at gym and that has helped some. He has not tried therapy so   Objective Measures   Objective Measures Objective Measure 1;Objective Measure 2;Objective Measure 3;Objective Measure 4;Objective Measure 5   Objective Measure 1   Objective Measure DWC   Details 19 bilaterally   Objective Measure 2   Objective Measure Pain   Details rest 0, has gone up to 6/10  recently. Has been not trying to push use   Objective  Measure 3   Objective Measure MMT   Details wrist- strong and pain free- d   Objective Measure 4   Objective Measure elbow flexion test   Details positive after 30 seconds   Objective Measure 5   Objective Measure    Details right 120, left deferred as to not cause pain today was 118#   Treatment Interventions (OT)   Interventions Self Care/Home Management;Therapeutic Procedure/Exercise;Neuromuscular Re-education   Self Care/Home Management   Self-Care/Home Mgmt/ADL, Compensatory, Meal Prep Minutes (10454) 15 Minutes   Self Care 1 education on sleeping postioning, using pillow or prefab style splint to keep elbow more extended at night   Skilled Intervention guided instruction need for home use and safety   Patient Response/Progress demonstrates understanding   PTRX Self Care 1 Cubital Tunnel Prevention   PTRX Self Care 1 - Details HEP education   PTRX Self Care 2 Warmth   PTRX Self Care 2 - Details HEP education   Neuromuscular Re-education   PTRX Neuro Re-ed 1 Nerve Flossing Mildly Irritable Ulnar Nerve - Cubital Tunnel   PTRX Neuro Re-ed 1 - Details x 3 with HEP instruction   Neuromuscular Re-ed Minutes (97886) 3   Skilled Intervention guided instruction to teach EP- verbal and physical cuing needed   Therapeutic Procedure/Exercise   PTRX Ther Proc 1 Wrist Passive Range of Motion Radial Deviation   PTRX Ther Proc 1 - Details hold 5 seconds - no pain   PTRX Ther Proc 2 Wrist Passive Range of Motion Flexion   PTRX Ther Proc 2 - Details hold 5 seconds - no pain   PTRX Ther Proc 3 Wrist Passive Range of Motion Extension   PTRX Ther Proc 3 - Details No Notes   Therapeutic Procedure: strength, endurance, ROM, flexibility minutes (08436) 10   Skilled Intervention guided instruction for HEP instruction   Patient Response/Progress demonstrate fair understanding   Other Treatment Interventions   PTRX Other  1 Friction Massage   PTRX Other 1 - Details at ulnar wrist   Eval/Assessments   Assessments Re-Eval   OT Eval,  Low Complexity Minutes (73472) 20   Education   Learner/Method Patient;Listening;Reading;Demonstration;Pictures/Video;No Barriers to Learning   Education Comments PTRX put on phone   Plan   Home program splinting and wrist widget wear, activity modification   Plan for next session Will start therapy coming in 1x/week for 6 weeks utilizing modalities, manual techniques and ex , self care to achieve functional goals.   Comments   Comments Patient had not been in since end of December so needed to a more lengthy re- evaluation  today. Suspect ulnar nerve involvement as well as ECU based on today's findings

## 2025-04-15 ENCOUNTER — THERAPY VISIT (OUTPATIENT)
Dept: OCCUPATIONAL THERAPY | Facility: CLINIC | Age: 30
End: 2025-04-15
Attending: PHYSICIAN ASSISTANT
Payer: COMMERCIAL

## 2025-04-15 DIAGNOSIS — M25.532 LEFT WRIST PAIN: Primary | ICD-10-CM

## 2025-04-15 PROCEDURE — 97035 APP MDLTY 1+ULTRASOUND EA 15: CPT | Mod: GO | Performed by: OCCUPATIONAL THERAPIST

## 2025-04-15 PROCEDURE — 97140 MANUAL THERAPY 1/> REGIONS: CPT | Mod: GO | Performed by: OCCUPATIONAL THERAPIST

## 2025-04-19 ENCOUNTER — HEALTH MAINTENANCE LETTER (OUTPATIENT)
Age: 30
End: 2025-04-19

## 2025-04-22 ENCOUNTER — THERAPY VISIT (OUTPATIENT)
Dept: OCCUPATIONAL THERAPY | Facility: CLINIC | Age: 30
End: 2025-04-22
Attending: PHYSICIAN ASSISTANT
Payer: COMMERCIAL

## 2025-04-22 DIAGNOSIS — M25.532 LEFT WRIST PAIN: Primary | ICD-10-CM

## 2025-04-22 PROCEDURE — 97763 ORTHC/PROSTC MGMT SBSQ ENC: CPT | Mod: GO | Performed by: OCCUPATIONAL THERAPIST

## 2025-04-22 PROCEDURE — 97035 APP MDLTY 1+ULTRASOUND EA 15: CPT | Mod: GO | Performed by: OCCUPATIONAL THERAPIST

## 2025-07-07 NOTE — PROGRESS NOTES
ASSESSMENT & PLAN    Daniel was seen today for pain and follow up.    Diagnoses and all orders for this visit:    Left wrist pain  -     predniSONE (DELTASONE) 20 MG tablet; Take 2 tablets (40 mg) by mouth daily (with breakfast) for 5 days.  -     Orthopedic  Referral; Future      This issue is chronic and Unchanged.    # Left wrist pain: Daniel Thomas  was seen today for left wrist pain. Symptoms had been going on for 9+ months. He reports no improvement despite diclofenac course, hand therapy and intermittent bracing to date. On examination there are positive findings of delayed soreness/ pain after strength testing over the volar wrist primarily and ECU secondarily. 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     Likely cause of condition is ECU tendinopathy, however he states that recently the pain has been more diffuse and primarily on the volar side of the wrist. No jose paraesthesias to suggest carpal tunnel. Counseled patient on nature of condition and treatment options.    - Referral: hand surg for second opinion   - Activity: activity as tolerated. Continue hand therapy exercises as tolerated Wrist brace as needed  - Ice, heat, massage as needed  - Medications:      - prednisone 40mg daily x5 days     - Okay to take tylenol 1000mg three times daily as needed in addition to these.     Follow-up: with hand surgery as referred    Romeo Sarmiento MD  [facility] [place]    SUBJECTIVE- Interim History Jul 8, 2025    Chief Complaint   Patient presents with    Left Wrist - Pain, Follow Up       Daniel Thomas is a 29 year old male who is seen in f/u up for Left wrist pain. Since last visit on 1/7/25 patient has continued volar wrist pain and swelling. He reports that pain worsens with lifting anything. He reports no benefit from hand therapy, custom wrist brace or oral diclofenac. He reports that he still  has a lifting restriction at work.  - Now ~ 9 months from initial onset (~October 2024)    Location of Pain: left wrist, entire wrist  Worsened by: gripping/squeezing, lifting, pushing with wrist in extension  Better with: rest, ibuprofen, Tylenol  Treatments tried: hand therapy (4 visits) wrist brace, ibuprofen, Tylenol, oral diclofenac  Associated symptoms: swelling - worse with wrist widget    The patient is seen by themselves.  The patient is Right handed    Orthopedic/Surgical history: NO  Social History/Occupation: works for [employer]      REVIEW OF SYSTEMS:  Review of Systems    OBJECTIVE:  There were no vitals taken for this visit.   General: healthy, alert and in no distress  Skin: no suspicious lesions or rash.  CV: distal perfusion intact  Resp: normal respiratory effort without conversational dyspnea   Psych: normal mood and affect  Gait: NORMAL  Neuro: Normal light sensory exam of left upper extremity      LEFT WRIST  Inspection:    No swelling or obvious deformity or asymmetry  Palpation:    Mildly tender diffusely about the volar wrist and ECU. No lunate tenderness Remainder of bony and ligamentous line marks are nontender.   Range of Motion:    Full (active and passive) flexion, extension, pronation/supination, and ulnar/radial deviation.  Strength:    No deficits in flexion, extension, ulnar/radial deviation, or  strength. Reports a delayed pain after testing over the volar wrist and ECU.  Special Tests:    Positive: none    Negative: Tinel's       RADIOLOGY:  Final results and radiologist's interpretation, available in the [facility] health record.  Images were reviewed with the patient in the office today.  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

## 2025-07-08 ENCOUNTER — OFFICE VISIT (OUTPATIENT)
Dept: ORTHOPEDICS | Facility: CLINIC | Age: 30
End: 2025-07-08
Payer: COMMERCIAL

## 2025-07-08 DIAGNOSIS — M25.532 LEFT WRIST PAIN: Primary | ICD-10-CM

## 2025-07-08 PROCEDURE — 99214 OFFICE O/P EST MOD 30 MIN: CPT | Performed by: STUDENT IN AN ORGANIZED HEALTH CARE EDUCATION/TRAINING PROGRAM

## 2025-07-08 RX ORDER — PREDNISONE 20 MG/1
40 TABLET ORAL
Qty: 10 TABLET | Refills: 0 | Status: SHIPPED | OUTPATIENT
Start: 2025-07-08 | End: 2025-07-13

## 2025-07-08 NOTE — LETTER
7/8/2025      Daniel Thomas  [id number] [facility] [place] [address]      Dear Colleague,    Thank you for referring your patient, Daniel Thomas, to the [facility]. Please see a copy of my visit note below.    ASSESSMENT & PLAN    Daniel was seen today for pain and follow up.    Diagnoses and all orders for this visit:    Left wrist pain  -     predniSONE (DELTASONE) 20 MG tablet; Take 2 tablets (40 mg) by mouth daily (with breakfast) for 5 days.  -     Orthopedic  Referral; Future      This issue is chronic and Unchanged.    # Left wrist pain: Daniel Thomas  was seen today for left wrist pain. Symptoms had been going on for 9+ months. He reports no improvement despite diclofenac course, hand therapy and intermittent bracing to date. On examination there are positive findings of delayed soreness/ pain after strength testing over the volar wrist primarily and ECU secondarily. 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     Likely cause of condition is ECU tendinopathy, however he states that recently the pain has been more diffuse and primarily on the volar side of the wrist. No jose paraesthesias to suggest carpal tunnel. Counseled patient on nature of condition and treatment options.    - Referral: hand surg for second opinion   - Activity: activity as tolerated. Continue hand therapy exercises as tolerated Wrist brace as needed  - Ice, heat, massage as needed  - Medications:      - prednisone 40mg daily x5 days     - Okay to take tylenol 1000mg three times daily as needed in addition to these.     Follow-up: with hand surgery as referred    Romeo Sarmiento MD  [facility]    SUBJECTIVE- Interim History Jul 8, 2025    Chief Complaint   Patient presents with     Left Wrist - Pain, Follow Up       Daniel Thomas is a 29 year old male who is seen in f/u up  for Left wrist pain. Since last visit on 1/7/25 patient has continued volar wrist pain and swelling. He reports that pain worsens with lifting anything. He reports no benefit from hand therapy, custom wrist brace or oral diclofenac. He reports that he still has a lifting restriction at work.  - Now ~ 9 months from initial onset (~October 2024)    Location of Pain: left wrist, entire wrist  Worsened by: gripping/squeezing, lifting, pushing with wrist in extension  Better with: rest, ibuprofen, Tylenol  Treatments tried: hand therapy (4 visits) wrist brace, ibuprofen, Tylenol, oral diclofenac  Associated symptoms: swelling - worse with wrist widget    The patient is seen by themselves.  The patient is Right handed    Orthopedic/Surgical history: NO  Social History/Occupation: works for [employer]      REVIEW OF SYSTEMS:  Review of Systems    OBJECTIVE:  There were no vitals taken for this visit.   General: healthy, alert and in no distress  Skin: no suspicious lesions or rash.  CV: distal perfusion intact  Resp: normal respiratory effort without conversational dyspnea   Psych: normal mood and affect  Gait: NORMAL  Neuro: Normal light sensory exam of left upper extremity      LEFT WRIST  Inspection:    No swelling or obvious deformity or asymmetry  Palpation:    Mildly tender diffusely about the volar wrist and ECU. No lunate tenderness Remainder of bony and ligamentous line marks are nontender.   Range of Motion:    Full (active and passive) flexion, extension, pronation/supination, and ulnar/radial deviation.  Strength:    No deficits in flexion, extension, ulnar/radial deviation, or  strength. Reports a delayed pain after testing over the volar wrist and ECU.  Special Tests:    Positive: none    Negative: Tinel's       RADIOLOGY:  Final results and radiologist's interpretation, available in the [facility] health record.  Images were reviewed with the patient in the office today.  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        Again, thank you for allowing me to participate in the care of your patient.        Sincerely,        Romeo Sarmiento MD    Electronically signed

## 2025-07-08 NOTE — PATIENT INSTRUCTIONS
[facility]  Scheduling # is [phone]   - Hand Surgery:   - Dr. Wallace Jacobs ([place])  - Dr. Farhat Banuelos ([facility])  - Dr. Yonas Tafoya ([place])  - Dr. Carla Coley ([place])  - Dr. Chris Jean-Baptiste ([facility])  - Dr. Johnna Bangura ([place])   - Dr. Vivian Cox ([facility])     [facility]  If you would like to go to [facility], please check with their staff that your insurance is accepted there prior to your visit!  Scheduling # is [phone]  - Hand Surgery: Venancio Paris MD and CARLOS Khan

## 2025-07-09 ENCOUNTER — PATIENT OUTREACH (OUTPATIENT)
Dept: CARE COORDINATION | Facility: CLINIC | Age: 30
End: 2025-07-09
Payer: COMMERCIAL

## 2025-08-05 ENCOUNTER — TELEPHONE (OUTPATIENT)
Dept: ORTHOPEDICS | Facility: CLINIC | Age: 30
End: 2025-08-05
Payer: COMMERCIAL

## 2025-08-12 ENCOUNTER — OFFICE VISIT (OUTPATIENT)
Dept: ORTHOPEDICS | Facility: CLINIC | Age: 30
End: 2025-08-12
Payer: COMMERCIAL

## 2025-08-12 VITALS — SYSTOLIC BLOOD PRESSURE: 112 MMHG | HEART RATE: 98 BPM | DIASTOLIC BLOOD PRESSURE: 76 MMHG | OXYGEN SATURATION: 96 %

## 2025-08-12 DIAGNOSIS — M77.8 LEFT WRIST TENDINITIS: Primary | ICD-10-CM

## 2025-08-12 PROCEDURE — 20550 NJX 1 TENDON SHEATH/LIGAMENT: CPT | Mod: LT | Performed by: STUDENT IN AN ORGANIZED HEALTH CARE EDUCATION/TRAINING PROGRAM

## 2025-08-12 PROCEDURE — 76942 ECHO GUIDE FOR BIOPSY: CPT | Mod: LT | Performed by: STUDENT IN AN ORGANIZED HEALTH CARE EDUCATION/TRAINING PROGRAM

## 2025-08-12 PROCEDURE — 99207 PR DROP WITH A PROCEDURE: CPT | Performed by: STUDENT IN AN ORGANIZED HEALTH CARE EDUCATION/TRAINING PROGRAM

## 2025-08-12 RX ORDER — BETAMETHASONE SODIUM PHOSPHATE AND BETAMETHASONE ACETATE 3; 3 MG/ML; MG/ML
6 INJECTION, SUSPENSION INTRA-ARTICULAR; INTRALESIONAL; INTRAMUSCULAR; SOFT TISSUE
Status: COMPLETED | OUTPATIENT
Start: 2025-08-12 | End: 2025-08-12

## 2025-08-12 RX ORDER — ROPIVACAINE HYDROCHLORIDE 5 MG/ML
1 INJECTION, SOLUTION EPIDURAL; INFILTRATION; PERINEURAL
Status: COMPLETED | OUTPATIENT
Start: 2025-08-12 | End: 2025-08-12

## 2025-08-12 RX ADMIN — BETAMETHASONE SODIUM PHOSPHATE AND BETAMETHASONE ACETATE 6 MG: 3; 3 INJECTION, SUSPENSION INTRA-ARTICULAR; INTRALESIONAL; INTRAMUSCULAR; SOFT TISSUE at 08:26

## 2025-08-12 RX ADMIN — ROPIVACAINE HYDROCHLORIDE 1 ML: 5 INJECTION, SOLUTION EPIDURAL; INFILTRATION; PERINEURAL at 08:26
